# Patient Record
Sex: MALE | Race: WHITE | NOT HISPANIC OR LATINO | Employment: OTHER | URBAN - METROPOLITAN AREA
[De-identification: names, ages, dates, MRNs, and addresses within clinical notes are randomized per-mention and may not be internally consistent; named-entity substitution may affect disease eponyms.]

---

## 2017-01-12 RX ORDER — DULOXETIN HYDROCHLORIDE 60 MG/1
CAPSULE, DELAYED RELEASE ORAL
Qty: 60 CAPSULE | Refills: 3 | Status: SHIPPED | OUTPATIENT
Start: 2017-01-12 | End: 2017-05-14 | Stop reason: SDUPTHER

## 2017-02-10 ENCOUNTER — TELEPHONE (OUTPATIENT)
Dept: PAIN MEDICINE | Facility: CLINIC | Age: 67
End: 2017-02-10

## 2017-03-09 DIAGNOSIS — E03.9 HYPOTHYROIDISM, UNSPECIFIED TYPE: Primary | ICD-10-CM

## 2017-03-13 ENCOUNTER — RESULTS ENCOUNTER (OUTPATIENT)
Dept: FAMILY MEDICINE CLINIC | Facility: CLINIC | Age: 67
End: 2017-03-13

## 2017-03-13 DIAGNOSIS — E03.9 HYPOTHYROIDISM, UNSPECIFIED TYPE: ICD-10-CM

## 2017-03-14 ENCOUNTER — TELEPHONE (OUTPATIENT)
Dept: PAIN MEDICINE | Facility: CLINIC | Age: 67
End: 2017-03-14

## 2017-03-14 NOTE — TELEPHONE ENCOUNTER
Patient received a denial letter from [employer] that the SCS has NOT been approved. He wanted to know if you believe this needs to be appealed.    Spoke to Jenna and she states she sent all medical records that we have on him for the initial approval that was denied

## 2017-05-15 RX ORDER — LISINOPRIL AND HYDROCHLOROTHIAZIDE 20; 12.5 MG/1; MG/1
TABLET ORAL
Qty: 90 TABLET | Refills: 1 | Status: SHIPPED | OUTPATIENT
Start: 2017-05-15 | End: 2017-12-26 | Stop reason: SDUPTHER

## 2017-05-15 RX ORDER — DULOXETIN HYDROCHLORIDE 60 MG/1
CAPSULE, DELAYED RELEASE ORAL
Qty: 60 CAPSULE | Refills: 2 | Status: SHIPPED | OUTPATIENT
Start: 2017-05-15 | End: 2017-08-15 | Stop reason: SDUPTHER

## 2017-06-12 ENCOUNTER — OFFICE VISIT (OUTPATIENT)
Dept: FAMILY MEDICINE CLINIC | Facility: CLINIC | Age: 67
End: 2017-06-12

## 2017-06-12 VITALS
SYSTOLIC BLOOD PRESSURE: 142 MMHG | WEIGHT: 277.8 LBS | BODY MASS INDEX: 42.1 KG/M2 | HEIGHT: 68 IN | DIASTOLIC BLOOD PRESSURE: 76 MMHG | OXYGEN SATURATION: 94 % | TEMPERATURE: 98.3 F | HEART RATE: 79 BPM

## 2017-06-12 DIAGNOSIS — R91.1 SOLITARY PULMONARY NODULE: ICD-10-CM

## 2017-06-12 DIAGNOSIS — E03.9 ACQUIRED HYPOTHYROIDISM: ICD-10-CM

## 2017-06-12 DIAGNOSIS — I10 BENIGN ESSENTIAL HYPERTENSION: Primary | ICD-10-CM

## 2017-06-12 PROCEDURE — 99213 OFFICE O/P EST LOW 20 MIN: CPT | Performed by: FAMILY MEDICINE

## 2017-06-12 PROCEDURE — 90471 IMMUNIZATION ADMIN: CPT | Performed by: FAMILY MEDICINE

## 2017-06-12 PROCEDURE — 90715 TDAP VACCINE 7 YRS/> IM: CPT | Performed by: FAMILY MEDICINE

## 2017-06-12 RX ORDER — PHENYTOIN SODIUM 100 MG/1
CAPSULE, EXTENDED RELEASE ORAL
Qty: 450 CAPSULE | Refills: 2 | Status: SHIPPED | OUTPATIENT
Start: 2017-06-12 | End: 2018-05-02 | Stop reason: SDUPTHER

## 2017-06-12 NOTE — PROGRESS NOTES
"Subjective   Chava Stone Sr. is a 66 y.o. male.     Chief Complaint   Patient presents with   • Hypertension     follow up pt is fasting        History of Present Illness    Hypertension follow up. Doing well with current medication which he is taking as directed. No known high or low blood pressure episodes. No cardiovascular or neurological symptoms. Today's BP: 142/76 .    Hypothyroidism followup.  Taking levothyroxine as indicated.  No symptoms of high or low thyroid.  Last TSH as follows:    Lab Results   Component Value Date    TSH 5.060 (H) 12/12/2016        Pulmonary history of a pulmonary nodule.  Originally seen on the lateral view of an x-ray a year or 2 ago.  Last chest x-ray 2016 revealed that the nodule was gone.    Back pain syndrome.  He continues to follow with pain management.                The following portions of the patient's history were reviewed and updated as appropriate: allergies, current medications, past family history, past medical history, past social history, past surgical history and problem list.          Review of Systems   Constitutional: Negative.    Respiratory: Negative.    Cardiovascular: Negative.    Musculoskeletal: Negative.    Neurological: Negative.    Psychiatric/Behavioral: Negative.        Objective   Blood pressure 142/76, pulse 79, temperature 98.3 °F (36.8 °C), temperature source Oral, height 68\" (172.7 cm), weight 277 lb 12.8 oz (126 kg), SpO2 94 %.  Physical Exam   Constitutional: No distress.   HENT:   Head: Atraumatic.   Neck: Normal range of motion. Neck supple.   Cardiovascular: Normal rate and regular rhythm.    Pulmonary/Chest: Effort normal and breath sounds normal.   Skin: Skin is warm and dry.   Psychiatric: He has a normal mood and affect. His behavior is normal.       Assessment/Plan   Chava was seen today for hypertension.    Diagnoses and all orders for this visit:    Benign essential hypertension  -     Comprehensive Metabolic Panel  -     Lipid " Panel    Solitary pulmonary nodule    Acquired hypothyroidism  -     TSH Rfx On Abnormal To Free T4    Other orders  -     Tdap Vaccine Greater Than or Equal To 6yo IM      Hypertension.  We recently refilled his blood pressure medication.  Lab work today.    History of a solitary pulmonary nodule.  Now resolved.    Hypothyroidism.  Check TSH and adjust according.    Immunizations.  TdaP today.  Recent grandchild born.    Follow-up in 6 months for initial Medicare wellness visit.

## 2017-06-13 LAB
ALBUMIN SERPL-MCNC: 4 G/DL (ref 3.6–4.8)
ALBUMIN/GLOB SERPL: 1.3 {RATIO} (ref 1.2–2.2)
ALP SERPL-CCNC: 97 IU/L (ref 39–117)
ALT SERPL-CCNC: 29 IU/L (ref 0–44)
AST SERPL-CCNC: 25 IU/L (ref 0–40)
BILIRUB SERPL-MCNC: <0.2 MG/DL (ref 0–1.2)
BUN SERPL-MCNC: 24 MG/DL (ref 8–27)
BUN/CREAT SERPL: 15 (ref 10–24)
CALCIUM SERPL-MCNC: 8.9 MG/DL (ref 8.6–10.2)
CHLORIDE SERPL-SCNC: 97 MMOL/L (ref 96–106)
CHOLEST SERPL-MCNC: 259 MG/DL (ref 100–199)
CO2 SERPL-SCNC: 24 MMOL/L (ref 18–29)
CREAT SERPL-MCNC: 1.58 MG/DL (ref 0.76–1.27)
GLOBULIN SER CALC-MCNC: 3 G/DL (ref 1.5–4.5)
GLUCOSE SERPL-MCNC: 96 MG/DL (ref 65–99)
HDLC SERPL-MCNC: 80 MG/DL
LDLC SERPL CALC-MCNC: 161 MG/DL (ref 0–99)
POTASSIUM SERPL-SCNC: 5.1 MMOL/L (ref 3.5–5.2)
PROT SERPL-MCNC: 7 G/DL (ref 6–8.5)
SODIUM SERPL-SCNC: 139 MMOL/L (ref 134–144)
TRIGL SERPL-MCNC: 88 MG/DL (ref 0–149)
TSH SERPL DL<=0.005 MIU/L-ACNC: 3.66 UIU/ML (ref 0.45–4.5)
VLDLC SERPL CALC-MCNC: 18 MG/DL (ref 5–40)

## 2017-06-13 NOTE — PROGRESS NOTES
Kidney function is off.  Possibly related to fasting.  Please return for further evaluation.  Make sure you drink plenty water that day.  I want to repeat the lab studies.  Also further discuss with you.  Also cholesterol is high.  Will need to further discuss.

## 2017-08-15 RX ORDER — DULOXETIN HYDROCHLORIDE 60 MG/1
CAPSULE, DELAYED RELEASE ORAL
Qty: 60 CAPSULE | Refills: 1 | Status: SHIPPED | OUTPATIENT
Start: 2017-08-15 | End: 2017-10-11 | Stop reason: SDUPTHER

## 2017-09-01 RX ORDER — PREGABALIN 150 MG/1
CAPSULE ORAL
Qty: 360 CAPSULE | Refills: 2 | OUTPATIENT
Start: 2017-09-01 | End: 2018-03-26 | Stop reason: HOSPADM

## 2017-09-19 RX ORDER — LEVOTHYROXINE SODIUM 88 UG/1
TABLET ORAL
Qty: 90 TABLET | Refills: 1 | Status: SHIPPED | OUTPATIENT
Start: 2017-09-19 | End: 2018-03-14 | Stop reason: SDUPTHER

## 2017-10-11 RX ORDER — DULOXETIN HYDROCHLORIDE 60 MG/1
CAPSULE, DELAYED RELEASE ORAL
Qty: 60 CAPSULE | Refills: 2 | Status: SHIPPED | OUTPATIENT
Start: 2017-10-11 | End: 2018-01-29 | Stop reason: SDUPTHER

## 2017-12-26 RX ORDER — LISINOPRIL AND HYDROCHLOROTHIAZIDE 20; 12.5 MG/1; MG/1
TABLET ORAL
Qty: 90 TABLET | Refills: 1 | Status: SHIPPED | OUTPATIENT
Start: 2017-12-26 | End: 2018-06-29 | Stop reason: SDUPTHER

## 2017-12-27 DIAGNOSIS — M48.061 SPINAL STENOSIS OF LUMBAR REGION, UNSPECIFIED WHETHER NEUROGENIC CLAUDICATION PRESENT: ICD-10-CM

## 2017-12-27 DIAGNOSIS — M96.1 POSTLAMINECTOMY SYNDROME OF LUMBAR REGION: Primary | ICD-10-CM

## 2018-01-24 ENCOUNTER — TREATMENT (OUTPATIENT)
Dept: PHYSICAL THERAPY | Facility: CLINIC | Age: 68
End: 2018-01-24

## 2018-01-24 DIAGNOSIS — R26.2 DIFFICULTY IN WALKING: Primary | ICD-10-CM

## 2018-01-24 DIAGNOSIS — R29.898 WEAKNESS OF PROXIMAL END OF LOWER EXTREMITY: ICD-10-CM

## 2018-01-24 PROCEDURE — 97110 THERAPEUTIC EXERCISES: CPT | Performed by: PHYSICAL THERAPIST

## 2018-01-24 PROCEDURE — G8978 MOBILITY CURRENT STATUS: HCPCS | Performed by: PHYSICAL THERAPIST

## 2018-01-24 PROCEDURE — G8979 MOBILITY GOAL STATUS: HCPCS | Performed by: PHYSICAL THERAPIST

## 2018-01-24 PROCEDURE — 97162 PT EVAL MOD COMPLEX 30 MIN: CPT | Performed by: PHYSICAL THERAPIST

## 2018-01-24 PROCEDURE — 97116 GAIT TRAINING THERAPY: CPT | Performed by: PHYSICAL THERAPIST

## 2018-01-24 NOTE — PROGRESS NOTES
Physical Therapy Initial Evaluation and Plan of Care      Patient: Chava Stone Sr.   : 1950  Diagnosis/ICD-10 Code:  Difficulty in walking [R26.2]  Referring practitioner: LIANA Silver    Subjective Evaluation    History of Present Illness  Mechanism of injury: 2006 started locking up. Getting weak.  Stimulator implanted for back pain. PAin then moved to legs. Can't walk. Need rollator.  No injuries. Would get LBP after bending. L LE numb and foot drop 30 years ago. Resolved.  Now  Numbness and weakness L > R numbness. R leg feel sstronger  Tried [place] trak, got sharp pain L-S      Patient Occupation: used to  [employer]. Oct 2006. Pain  Quality: dull ache  Relieving factors: rest  Exacerbated by: constant.    Social Support  Lives in: one-story house (one step to house)  Lives with: friend.    Treatments  Previous treatment: physical therapy (water)  Current treatment: medication  Patient Goals  Patient goals for therapy: improved balance, independence with ADLs/IADLs and increased strength  Patient goal: difficult getting in and out of tub. Balance getting worse           Objective       Passive Range of Motion   Left Hip   Internal rotation (90/90): 0 (seated) degrees     Right Hip   Internal rotation (90/90): 0 (seated) degrees     Strength/Myotome Testing     Left Hip   Planes of Motion   Flexion: 3-    Right Hip   Planes of Motion   Flexion: 3-    Additional Strength Details  Pt unable to lay supine, sit without UE support on table. Unable to do specific strength test due to deconditioned status  Pt needed UE assistance B to perform sit to  c,inic  Extends R knee when performing sit to stand.        Ambulation     Ambulation: Level Surfaces   Ambulation with assistive device: moderate assist ( rollator)         Assessment & Plan     Assessment  Impairments: abnormal or restricted ROM, activity intolerance, impaired physical strength and lacks appropriate home  exercise program  Assessment details:     Pt is a candidate for skilled PT intervention to restore functional AROM and strength to return to previous level of ADL's.  Very deconditioned with simple bed mobility and gait  Prognosis: fair  Prognosis details:     Short Term Goals (4 weeks)  1. Pt to be (I) with HEP  2. Pt able to sit upright without UE or back support  3. Pt ambulate // bars 10 without fatigue complaints    Long Term Goals (8 weeks)  1. Pt able to walk household distances with straight cane  3 miles without residual hip or knee pain  2. Pt to exhibit 4/5 hip flexion strength  3. Pt to perform sit to stand (I)  4. Pt to score 40/80 on LEFS  Functional Limitations: walking, uncomfortable because of pain and moving in bed      Manual Therapy:    10     mins  03002;  Therapeutic Exercise:    5     mins  80661;     Neuromuscular Iraida:        mins  88824;    Therapeutic Activity:          mins  69612;     Gait Training:      10     mins  30886;     Ultrasound:          mins  51195;    Electrical Stimulation:         mins  31179 ( );  Dry Needling          mins self-pay    Timed Treatment:   25   mins   Total Treatment:     55   mins    PT SIGNATURE: Alaina Bang PT   [place] License # [id number]  DATE TREATMENT INITIATED: 1/25/2018    Initial Certification  Certification Period: 4/25/2018  I certify that the therapy services are furnished while this patient is under my care.  The services outlined above are required by this patient, and will be reviewed every 90 days.     PHYSICIAN: Amy Guerra, LIANA      DATE:     Please sign and return via fax to [phone].. Thank you, [facility].

## 2018-01-30 RX ORDER — DULOXETIN HYDROCHLORIDE 60 MG/1
CAPSULE, DELAYED RELEASE ORAL
Qty: 60 CAPSULE | Refills: 0 | Status: SHIPPED | OUTPATIENT
Start: 2018-01-30 | End: 2018-03-26 | Stop reason: HOSPADM

## 2018-02-01 ENCOUNTER — TREATMENT (OUTPATIENT)
Dept: PHYSICAL THERAPY | Facility: CLINIC | Age: 68
End: 2018-02-01

## 2018-02-01 DIAGNOSIS — R26.2 DIFFICULTY IN WALKING: Primary | ICD-10-CM

## 2018-02-01 DIAGNOSIS — R29.898 WEAKNESS OF PROXIMAL END OF LOWER EXTREMITY: ICD-10-CM

## 2018-02-01 PROCEDURE — 97110 THERAPEUTIC EXERCISES: CPT | Performed by: PHYSICAL THERAPIST

## 2018-02-01 PROCEDURE — 97530 THERAPEUTIC ACTIVITIES: CPT | Performed by: PHYSICAL THERAPIST

## 2018-02-02 NOTE — PROGRESS NOTES
Physical Therapy Daily Progress Note        Subjective     Chava Bertha reports: Sorry I missed last appt. Could not get a ride. Working on not crossing legs while seated    Objective   See Exercise, Manual, and Modality Logs for complete treatment.       Assessment/Plan  Mod assist of 2 for sit to stand from chair. Needs cues to stand upright in bars. Improved stride length today but limited hip extension during stance     Progress per Plan of Care           Manual Therapy:  5       mins  27564;  Therapeutic Exercise: 30      mins  26021;     Neuromuscular Iraida:       mins  04063;    Therapeutic Activity:    8     mins  56380;     Gait Training:         mins  98670;     Ultrasound:         mins  10005;    Electrical Stimulation:        mins  70068 ( );  Dry Needling         mins self-pay    Timed Treatment:   43   mins   Total Treatment:     45   mins    Alaina Bang PT  Physical Therapist  [place] License # [id number]

## 2018-02-08 ENCOUNTER — TREATMENT (OUTPATIENT)
Dept: PHYSICAL THERAPY | Facility: CLINIC | Age: 68
End: 2018-02-08

## 2018-02-08 DIAGNOSIS — R29.898 WEAKNESS OF PROXIMAL END OF LOWER EXTREMITY: ICD-10-CM

## 2018-02-08 DIAGNOSIS — R26.2 DIFFICULTY IN WALKING: Primary | ICD-10-CM

## 2018-02-08 PROCEDURE — 97140 MANUAL THERAPY 1/> REGIONS: CPT | Performed by: PHYSICAL THERAPIST

## 2018-02-08 PROCEDURE — 97110 THERAPEUTIC EXERCISES: CPT | Performed by: PHYSICAL THERAPIST

## 2018-02-08 PROCEDURE — 97116 GAIT TRAINING THERAPY: CPT | Performed by: PHYSICAL THERAPIST

## 2018-02-08 NOTE — PROGRESS NOTES
Physical Therapy Daily Progress Note        Subjective     Chava Stone reports: Sore everywhere. Working on not crossing legs when I sit    Objective   HIp abd 3-/5 B  See Exercise, Manual, and Modality Logs for complete treatment.       Assessment/Plan  Encouraged pt to sit in chairs that are firmer and to practice hip hinge/lean forward to assist with sit to stand transfer. Pt was able to sit upright with less UE support today. Great difficulty doing S/L hip abduction exercises    Progress per Plan of Care           Manual Therapy:  8       mins  16565;  Therapeutic Exercise: 15      mins  38736;     Neuromuscular Iraida:       mins  71556;    Therapeutic Activity:    6     mins  67976;     Gait Training:    10     mins  68929;     Ultrasound:         mins  22995;    Electrical Stimulation:        mins  24636 ( );  Dry Needling         mins self-pay    Timed Treatment:   39   mins   Total Treatment:     45   mins    Alaina Bang, PT  Physical Therapist  [place] License # [id number]

## 2018-02-15 ENCOUNTER — TREATMENT (OUTPATIENT)
Dept: PHYSICAL THERAPY | Facility: CLINIC | Age: 68
End: 2018-02-15

## 2018-02-15 DIAGNOSIS — R26.2 DIFFICULTY IN WALKING: Primary | ICD-10-CM

## 2018-02-15 PROCEDURE — 97140 MANUAL THERAPY 1/> REGIONS: CPT | Performed by: PHYSICAL THERAPIST

## 2018-02-15 PROCEDURE — 97110 THERAPEUTIC EXERCISES: CPT | Performed by: PHYSICAL THERAPIST

## 2018-02-15 PROCEDURE — 97116 GAIT TRAINING THERAPY: CPT | Performed by: PHYSICAL THERAPIST

## 2018-02-16 NOTE — PATIENT INSTRUCTIONS
Discussed getting Life Alert for personal use in case he falls again. He said he has discussed with his son.

## 2018-02-20 ENCOUNTER — TREATMENT (OUTPATIENT)
Dept: PHYSICAL THERAPY | Facility: CLINIC | Age: 68
End: 2018-02-20

## 2018-02-20 DIAGNOSIS — R26.2 DIFFICULTY IN WALKING: Primary | ICD-10-CM

## 2018-02-20 PROCEDURE — 97530 THERAPEUTIC ACTIVITIES: CPT | Performed by: PHYSICAL THERAPIST

## 2018-02-20 PROCEDURE — 97116 GAIT TRAINING THERAPY: CPT | Performed by: PHYSICAL THERAPIST

## 2018-02-20 PROCEDURE — 97110 THERAPEUTIC EXERCISES: CPT | Performed by: PHYSICAL THERAPIST

## 2018-02-20 NOTE — PROGRESS NOTES
Physical Therapy Daily Progress Note        Subjective     Chava Stone reports: NO new complaints.    Objective   See Exercise, Manual, and Modality Logs for complete treatment.       Assessment/Plan  Pt able to lay supine for first time on mat table but needed to keep hips and knees flexed on ball. Unable to tolerate flat supine lying. Still difficulty getting up from chair/bench in waiting room    Progress per Plan of Care   Attempt Nustep next visit           Manual Therapy:         mins  51583;  Therapeutic Exercise: 22      mins  58649;     Neuromuscular Iraida:       mins  44630;    Therapeutic Activity:    6     mins  73735;     Gait Training:    10     mins  46406;     Ultrasound:         mins  73588;    Electrical Stimulation:        mins  97617 ( );  Dry Needling         mins self-pay    Timed Treatment:   38   mins   Total Treatment:     50   mins    Alaina Bang, PT  Physical Therapist  [place] License # [id number]

## 2018-02-27 ENCOUNTER — TREATMENT (OUTPATIENT)
Dept: PHYSICAL THERAPY | Facility: CLINIC | Age: 68
End: 2018-02-27

## 2018-02-27 DIAGNOSIS — R29.898 WEAKNESS OF PROXIMAL END OF LOWER EXTREMITY: ICD-10-CM

## 2018-02-27 DIAGNOSIS — R26.2 DIFFICULTY IN WALKING: Primary | ICD-10-CM

## 2018-02-27 PROCEDURE — 97110 THERAPEUTIC EXERCISES: CPT | Performed by: PHYSICAL THERAPIST

## 2018-02-27 PROCEDURE — 97116 GAIT TRAINING THERAPY: CPT | Performed by: PHYSICAL THERAPIST

## 2018-02-27 PROCEDURE — 97140 MANUAL THERAPY 1/> REGIONS: CPT | Performed by: PHYSICAL THERAPIST

## 2018-02-27 RX ORDER — DULOXETIN HYDROCHLORIDE 60 MG/1
CAPSULE, DELAYED RELEASE ORAL
Qty: 60 CAPSULE | Refills: 0 | OUTPATIENT
Start: 2018-02-27

## 2018-02-27 NOTE — PROGRESS NOTES
Physical Therapy Daily Progress Note        Subjective     Chava Stone reports: HAving rough day with knees, R > L    Objective   See Exercise, Manual, and Modality Logs for complete treatment.       Assessment/Plan  Pt able to transfer sit to supine without assist today. Needed mod A of one to perform sit to stand with gait belt but needed cueing to not reach for walker while trying to stand. Improved upright posture in parallel bars.     Progress per Plan of Care           Manual Therapy:  10       mins  05100;  Therapeutic Exercise: 30      mins  57680;     Neuromuscular Iraida:       mins  41585;    Therapeutic Activity:         mins  18239;     Gait Training:    10     mins  50008;     Ultrasound:         mins  13373;    Electrical Stimulation:        mins  49756 ( );  Dry Needling         mins self-pay    Timed Treatment:   50   mins   Total Treatment:     55   mins    Alaina Bang, PT  Physical Therapist  [place] License # [id number]

## 2018-03-05 RX ORDER — DULOXETIN HYDROCHLORIDE 60 MG/1
CAPSULE, DELAYED RELEASE ORAL
Qty: 60 CAPSULE | Refills: 0 | OUTPATIENT
Start: 2018-03-05

## 2018-03-13 ENCOUNTER — TREATMENT (OUTPATIENT)
Dept: PHYSICAL THERAPY | Facility: CLINIC | Age: 68
End: 2018-03-13

## 2018-03-13 DIAGNOSIS — R26.2 DIFFICULTY IN WALKING: Primary | ICD-10-CM

## 2018-03-13 DIAGNOSIS — R29.898 WEAKNESS OF PROXIMAL END OF LOWER EXTREMITY: ICD-10-CM

## 2018-03-13 PROCEDURE — 97110 THERAPEUTIC EXERCISES: CPT | Performed by: PHYSICAL THERAPIST

## 2018-03-13 PROCEDURE — 97116 GAIT TRAINING THERAPY: CPT | Performed by: PHYSICAL THERAPIST

## 2018-03-14 RX ORDER — LEVOTHYROXINE SODIUM 88 UG/1
TABLET ORAL
Qty: 90 TABLET | Refills: 1 | Status: SHIPPED | OUTPATIENT
Start: 2018-03-14 | End: 2018-08-21

## 2018-03-20 DIAGNOSIS — G89.29 OTHER CHRONIC PAIN: ICD-10-CM

## 2018-03-20 DIAGNOSIS — M96.1 POSTLAMINECTOMY SYNDROME OF LUMBAR REGION: Primary | ICD-10-CM

## 2018-03-20 DIAGNOSIS — M48.061 SPINAL STENOSIS OF LUMBAR REGION, UNSPECIFIED WHETHER NEUROGENIC CLAUDICATION PRESENT: ICD-10-CM

## 2018-03-26 ENCOUNTER — OFFICE VISIT (OUTPATIENT)
Dept: PAIN MEDICINE | Facility: CLINIC | Age: 68
End: 2018-03-26

## 2018-03-26 VITALS
SYSTOLIC BLOOD PRESSURE: 149 MMHG | WEIGHT: 277 LBS | TEMPERATURE: 97.3 F | DIASTOLIC BLOOD PRESSURE: 86 MMHG | OXYGEN SATURATION: 94 % | HEART RATE: 85 BPM | RESPIRATION RATE: 18 BRPM | HEIGHT: 68 IN | BODY MASS INDEX: 41.98 KG/M2

## 2018-03-26 DIAGNOSIS — M48.061 SPINAL STENOSIS OF LUMBAR REGION, UNSPECIFIED WHETHER NEUROGENIC CLAUDICATION PRESENT: ICD-10-CM

## 2018-03-26 DIAGNOSIS — M54.16 LUMBAR RADICULOPATHY: ICD-10-CM

## 2018-03-26 DIAGNOSIS — M96.1 POSTLAMINECTOMY SYNDROME OF LUMBAR REGION: ICD-10-CM

## 2018-03-26 DIAGNOSIS — G89.29 OTHER CHRONIC PAIN: Primary | ICD-10-CM

## 2018-03-26 DIAGNOSIS — M79.604 PAIN IN BOTH LOWER EXTREMITIES: ICD-10-CM

## 2018-03-26 DIAGNOSIS — M79.605 PAIN IN BOTH LOWER EXTREMITIES: ICD-10-CM

## 2018-03-26 PROCEDURE — 99214 OFFICE O/P EST MOD 30 MIN: CPT | Performed by: NURSE PRACTITIONER

## 2018-03-26 RX ORDER — DULOXETIN HYDROCHLORIDE 60 MG/1
120 CAPSULE, DELAYED RELEASE ORAL NIGHTLY
Qty: 60 CAPSULE | Refills: 1 | Status: SHIPPED | OUTPATIENT
Start: 2018-03-26 | End: 2018-05-21 | Stop reason: SDUPTHER

## 2018-03-26 RX ORDER — PREGABALIN 150 MG/1
CAPSULE ORAL
Qty: 60 CAPSULE | Refills: 1 | Status: SHIPPED | OUTPATIENT
Start: 2018-03-26 | End: 2018-05-21 | Stop reason: SDUPTHER

## 2018-03-26 NOTE — PROGRESS NOTES
"CHIEF COMPLAINT  Leg pain has increased since he was last seen in 2016. He states he stopped PT 2 weeks ago because the therapist told him that it was not helping. He states he has stopped taking Lyrica and Cymbalta because of side effects.    Subjective   Chava Stone Tam is a 67 y.o. male  who presents to the office for follow-up.He has a history of chronic bilateral leg pain with a history of back surgery. He has been without Lyrica and Cymbalta for a few weeks. His pain is worse.     He was supposed to have SCS battery revision, but his insurance denied this. We did appeal this, but the denial was upheld.   He currently has a BS SCS that was placed 3-2014.He has been seen by neurosurgery. Was told nothing further surgical.     Complains of pain in his low back and legs. Today his pain is 6/10VAS. Describes the pain as continuous and worse since last office visit.  He was previously on Lyrica and Cymbalta. Ran out and \"tried to tough it out to just see.\" Was on Cymbalta 60 mg once daily and Lyrica 150 mg 1-2/day PRN. Did have some somnolence but is unsure which is making him sleepy. He was taking Cymbalta during the morning.  ADL's by self.    He has a BS SCS. Is still using this almost all the time. \"It's on now and I leave it on.\"     Patient has failed PT and aquatherapy PT.   Has seen neurosurgery. No plans for future surgery.    Back Pain   The current episode started more than 1 year ago. The problem occurs daily. The problem has been gradually worsening since onset. The pain is present in the sacro-iliac and lumbar spine. The quality of the pain is described as aching and burning. The pain radiates to the left knee, left thigh, right knee and right thigh. The pain is at a severity of 6/10. The pain is moderate. The symptoms are aggravated by bending, position, lying down and standing. Stiffness is present all day. Associated symptoms include leg pain, numbness, paresthesias and weakness. Pertinent " "negatives include no abdominal pain, bladder incontinence, bowel incontinence, chest pain, dysuria, fever, headaches, pelvic pain, perianal numbness, tingling or weight loss. Risk factors include lack of exercise and obesity. Treatments tried: BS SCS, Lyrica, Cymbalta.      PEG Assessment   What number best describes your pain on average in the past week?6  What number best describes how, during the past week, pain has interfered with your enjoyment of life?6  What number best describes how, during the past week, pain has interfered with your general activity?  6    The following portions of the patient's history were reviewed and updated as appropriate: allergies, current medications, past family history, past medical history, past social history, past surgical history and problem list.    Review of Systems   Constitutional: Positive for chills. Negative for fever and weight loss.   Respiratory: Negative for shortness of breath.    Cardiovascular: Negative for chest pain.   Gastrointestinal: Negative for abdominal pain, bowel incontinence, constipation, diarrhea, nausea and vomiting.   Genitourinary: Negative for bladder incontinence, difficulty urinating, dysuria, enuresis and pelvic pain.   Musculoskeletal: Positive for back pain.   Neurological: Positive for weakness, numbness and paresthesias. Negative for dizziness, tingling, light-headedness and headaches.   Psychiatric/Behavioral: Negative for confusion, hallucinations, self-injury, sleep disturbance and suicidal ideas. The patient is not nervous/anxious.        Vitals:    03/26/18 1431   BP: 149/86   Pulse: 85   Resp: 18   Temp: 97.3 °F (36.3 °C)   SpO2: 94%   Weight: 126 kg (277 lb)   Height: 172.7 cm (68\")   PainSc:   6   PainLoc: Leg     Objective   Physical Exam   Constitutional: He is oriented to person, place, and time. Vital signs are normal. He appears well-developed and well-nourished. He is cooperative.   HENT:   Head: Normocephalic and " atraumatic.   Nose: Nose normal.   Eyes: Conjunctivae and lids are normal.   Cardiovascular: Normal rate.    Pulmonary/Chest: Effort normal.   Abdominal:   obese   Musculoskeletal:        Lumbar back: He exhibits tenderness, bony tenderness and pain.   Surgical scar of lumbar spine   Neurological: He is alert and oriented to person, place, and time. Gait (in wheelchair today) abnormal. Coordination normal.   Reflex Scores:       Patellar reflexes are 0 on the right side and 0 on the left side.       Achilles reflexes are 0 on the right side and 0 on the left side.  dysthesias of BLE's   Skin: Skin is warm, dry and intact.   Psychiatric: He has a normal mood and affect. His speech is normal and behavior is normal. Judgment and thought content normal. Cognition and memory are normal.   Nursing note and vitals reviewed.      Assessment/Plan   Diagnoses and all orders for this visit:    Other chronic pain    Postlaminectomy syndrome of lumbar region    Spinal stenosis of lumbar region, unspecified whether neurogenic claudication present    Lumbar radiculopathy    Pain in both lower extremities    Other orders  -     DULoxetine (CYMBALTA) 60 MG capsule; Take 2 capsules by mouth Every Night.  -     pregabalin (LYRICA) 150 MG capsule; Take 2 capsules PO twice a day      --- Re-start Cymbalta 60 mg NIGHTLY. Discussed medication with the patient.  Included in this discussion was the potential for side effects and adverse events.  Patient verbalized understanding and wished to proceed.  Prescription will be sent to pharmacy.  --- Re-start Lyrica 150 mg 1-2/day. Discussed medication with the patient.  Included in this discussion was the potential for side effects and adverse events.  Patient verbalized understanding and wished to proceed.  Prescription will be given to patient.  --- declines reprogramming.   --- Follow-up 6-8 weeks or sooner if needed.       ROXANA REPORT    As part of the patient's treatment plan, I am  prescribing controlled substances. The patient has been made aware of appropriate use of such medications, including potential risk of somnolence, limited ability to drive and/or work safely, and the potential for dependence or overdose. It has also bee made clear that these medications are for use by this patient only, without concomitant use of alcohol or other substances unless prescribed.     Patient has completed prescribing agreement detailing terms of continued prescribing of controlled substances, including monitoring ROXANA reports, urine drug screening, and pill counts if necessary. The patient is aware that inappropriate use will results in cessation of prescribing such medications.    ROXANA report has been reviewed and scanned into the patient's chart.    Date of last ROXANA : 3-23-18    History and physical exam exhibit continued safe and appropriate use of controlled substances.      EMR Dragon/Transcription disclaimer:   Much of this encounter note is an electronic transcription/translation of spoken language to printed text. The electronic translation of spoken language may permit erroneous, or at times, nonsensical words or phrases to be inadvertently transcribed; Although I have reviewed the note for such errors, some may still exist.

## 2018-04-11 RX ORDER — PHENYTOIN SODIUM 100 MG/1
CAPSULE, EXTENDED RELEASE ORAL
Qty: 450 CAPSULE | Refills: 1 | OUTPATIENT
Start: 2018-04-11

## 2018-05-02 RX ORDER — PHENYTOIN SODIUM 100 MG/1
100 CAPSULE, EXTENDED RELEASE ORAL DAILY
Qty: 450 CAPSULE | Refills: 2 | Status: SHIPPED | OUTPATIENT
Start: 2018-05-02 | End: 2018-05-07 | Stop reason: SDUPTHER

## 2018-05-02 NOTE — TELEPHONE ENCOUNTER
----- Message from Leigh Serna sent at 5/2/2018  1:17 PM EDT -----  Contact: [phone]  Patient needs a refill on DILANTIN 100 MG ER capsule

## 2018-05-07 RX ORDER — PHENYTOIN SODIUM 100 MG/1
500 CAPSULE, EXTENDED RELEASE ORAL DAILY
Qty: 450 CAPSULE | Refills: 2 | Status: SHIPPED | OUTPATIENT
Start: 2018-05-07 | End: 2018-08-21

## 2018-05-14 RX ORDER — LISINOPRIL AND HYDROCHLOROTHIAZIDE 20; 12.5 MG/1; MG/1
1 TABLET ORAL DAILY
Qty: 90 TABLET | Refills: 0 | OUTPATIENT
Start: 2018-05-14

## 2018-05-21 ENCOUNTER — OFFICE VISIT (OUTPATIENT)
Dept: PAIN MEDICINE | Facility: CLINIC | Age: 68
End: 2018-05-21

## 2018-05-21 VITALS
RESPIRATION RATE: 16 BRPM | OXYGEN SATURATION: 94 % | HEIGHT: 68 IN | DIASTOLIC BLOOD PRESSURE: 70 MMHG | HEART RATE: 103 BPM | SYSTOLIC BLOOD PRESSURE: 112 MMHG | TEMPERATURE: 99.5 F

## 2018-05-21 DIAGNOSIS — Z96.89 SPINAL CORD STIMULATOR STATUS: ICD-10-CM

## 2018-05-21 DIAGNOSIS — M79.604 PAIN IN BOTH LOWER EXTREMITIES: ICD-10-CM

## 2018-05-21 DIAGNOSIS — M79.605 PAIN IN BOTH LOWER EXTREMITIES: ICD-10-CM

## 2018-05-21 DIAGNOSIS — G89.29 OTHER CHRONIC PAIN: Primary | ICD-10-CM

## 2018-05-21 DIAGNOSIS — M54.16 LUMBAR RADICULOPATHY: ICD-10-CM

## 2018-05-21 DIAGNOSIS — M48.061 SPINAL STENOSIS OF LUMBAR REGION, UNSPECIFIED WHETHER NEUROGENIC CLAUDICATION PRESENT: ICD-10-CM

## 2018-05-21 PROCEDURE — 99213 OFFICE O/P EST LOW 20 MIN: CPT | Performed by: NURSE PRACTITIONER

## 2018-05-21 RX ORDER — PREGABALIN 150 MG/1
CAPSULE ORAL
Qty: 60 CAPSULE | Refills: 1 | Status: SHIPPED | OUTPATIENT
Start: 2018-05-21 | End: 2018-06-23 | Stop reason: SDUPTHER

## 2018-05-21 RX ORDER — DULOXETIN HYDROCHLORIDE 60 MG/1
120 CAPSULE, DELAYED RELEASE ORAL NIGHTLY
Qty: 60 CAPSULE | Refills: 1 | Status: SHIPPED | OUTPATIENT
Start: 2018-05-21 | End: 2018-07-24 | Stop reason: SDUPTHER

## 2018-05-21 NOTE — PROGRESS NOTES
CHIEF COMPLAINT  Pt states his R leg pain extending from hip to top of R foot has been worse since March/18  Pt also is having constant burning /tingling in feet bilaterally and continues to use walker.    Subjective   Chava Stone Sr. is a 67 y.o. male  who presents to the office for follow-up.He has a history of chronic back and leg pain. Reports his pain is worse since last office visit. He reports minimal improvement since re-starting his medication regimen. At his last office visit, he was restarted on Cymbalta 60 mg 2/day and Lyrica 150 mg 2 BID.  He later admits he is taking more Cymbalta. However, he is only taking 150 mg 2/morning and then 1/afternoon.    Patient has failed PT and aquatherapy PT.   Has seen neurosurgery. No plans for future surgery.  Has a history of abnormal prostate test with biopsy positive for cancer originally, but negative since.     Back Pain   The current episode started more than 1 year ago. The problem occurs 2 to 4 times per day. The problem has been gradually worsening (worse since last office visit) since onset. The pain is present in the sacro-iliac and lumbar spine. The quality of the pain is described as aching and burning. The pain radiates to the left knee, left thigh, right knee and right thigh. The pain is at a severity of 2/10 (pain ranges from 9/10VAS). The pain is moderate. The symptoms are aggravated by bending, position, lying down and standing. Stiffness is present all day. Associated symptoms include leg pain, numbness (legs), paresthesias and weakness (legs bilat.). Pertinent negatives include no abdominal pain, bladder incontinence, bowel incontinence, chest pain, dysuria, fever, headaches, pelvic pain, perianal numbness, tingling or weight loss. Risk factors include lack of exercise and obesity. Treatments tried: BS SCS, Lyrica, Cymbalta.      DATE OF EVALUATION: 03/25/2016     REFERRING PROVIDER: LIANA Solorio     HISTORY OF PRESENT ILLNESS: The  patient is a 65-year-old nondiabetic male who presents with a complaint of pain in the low back region and both lower extremities.      The patient has a history of chronic low back pain. He states that he has been experiencing pain and burning of both lower extremities for 18 months.      I. Nerve conduction studies:      The distal latency of the left peroneal motor nerve is 6.5 ms (upper limit of normal 6.1 ms). The amplitude of evoked response is 333 microvolts (normal greater than 2 mV). The conduction velocity is 38 m/sec.      The distal latency of left tibial motor nerve is 6.1 ms. The amplitude of evoked response is 2.6 mV. The conduction velocity is 42 m/sec.      The distal latency of the right peroneal motor nerve is 5.9 ms. The amplitude of evoked response is 333 microvolts (normal greater than 2 mV). The conduction velocity is 40 m/sec.     The distal latency of the right tibial motor nerve is 6.4 ms. The amplitude of evoked response is 1.2 mV (normal greater than 2 mV). The conduction velocity is 40 m/sec.      The distal latency of the right sural sensory nerve is 3.7 ms. The amplitude of evoked response is 8 microvolts (normal greater than 15 microvolts).      II. Electromyography:      Electromyography was performed on the following muscles of both lower extremities using a monopolar needle: Vastus lateralis, tibialis anterior, extensor digitorum longus, peroneus longus, and the medial and lateral gastrocnemius.      There were no changes in insertional activity. There were no denervation potentials present.      The motor unit action potentials were of normal height and duration. The recruitment patterns were normal.      III. IMPRESSION:      1. 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   2. There is no electrophysiologic evidence of a lumbar radiculopathy. If clinical suspicion is high for a radicular process, spinal imaging should be considered.         Mone GARSIA  JOJO Hidalgo.*    LUMBAR CT WITHOUT CONTRAST     HISTORY: Spinal cord stimulator. New onset weakness and bilateral lower  extremity radicular symptoms. Symptoms involve the left leg more than  the right.     TECHNIQUE: Axial noncontrast CT scan of the lumbar spine shows from  about the mid T7 vertebral level through the S2 level. This is  correlated with lumbar CT myelogram 02/26/2013.     FINDINGS: The prior myelogram showed the tip of the conus terminating at  the T12 inferior endplate level. Today's exam shows a spinal cord  stimulator device with leads entering the canal at the T12-L1  interlaminar space and extending cephalad along the posterior edge of  the canal such that their distal ends terminate at the level of the T8  superior endplate.     There is osseous bridging across the disc spaces to the right of midline  at every level from T7 through T11 and to the left of midline at T11-12.  Lower thoracic vertebral body height and alignment are normal. As can  best be appreciated, the canal and foramina are patent.     At T12-L1, the disc is narrowed but the canal and foramina are patent.     Lumbar vertebral body height and alignment are normal. There is minimal  disc narrowing at L1-2, L2-3, and L3-4 but the canal and foramina at  those levels remain patent.     At L4-5, there is disc narrowing and some vacuum disc phenomenon with  hypertrophic facet change worse on the left than the right. There is  moderate bony foraminal stenosis, similar to that seen previously.     At L5-S1, there is complete loss of disc height with bridging osteophyte  anteriorly. Foramina are mildly narrowed. The appearance is unchanged.     The visualized upper sacrum and retroperitoneum appear normal.     LUMBAR CT WITHOUT CONTRAST     HISTORY: Spinal cord stimulator. New onset weakness and bilateral lower  extremity radicular symptoms. Symptoms involve the left leg more than  the right.     TECHNIQUE: Axial noncontrast CT scan of  the lumbar spine shows from  about the mid T7 vertebral level through the S2 level. This is  correlated with lumbar CT myelogram 02/26/2013.     FINDINGS: The prior myelogram showed the tip of the conus terminating at  the T12 inferior endplate level. Today's exam shows a spinal cord  stimulator device with leads entering the canal at the T12-L1  interlaminar space and extending cephalad along the posterior edge of  the canal such that their distal ends terminate at the level of the T8  superior endplate.     There is osseous bridging across the disc spaces to the right of midline  at every level from T7 through T11 and to the left of midline at T11-12.  Lower thoracic vertebral body height and alignment are normal. As can  best be appreciated, the canal and foramina are patent.     At T12-L1, the disc is narrowed but the canal and foramina are patent.     Lumbar vertebral body height and alignment are normal. There is minimal  disc narrowing at L1-2, L2-3, and L3-4 but the canal and foramina at  those levels remain patent.     At L4-5, there is disc narrowing and some vacuum disc phenomenon with  hypertrophic facet change worse on the left than the right. There is  moderate bony foraminal stenosis, similar to that seen previously.     At L5-S1, there is complete loss of disc height with bridging osteophyte  anteriorly. Foramina are mildly narrowed. The appearance is unchanged.     The visualized upper sacrum and retroperitoneum appear normal.     IMPRESSION:  There is spinal cord stimulator device positioned as  described. There is some degenerative change in the lumbar spine but no  stenosis, disc herniation, or other lesion is identified.     There is spinal cord stimulator device positioned as  described. There is some degenerative change in the lumbar spine but no  stenosis, disc herniation, or other lesion is identified.     This report was finalized on 3/21/2016 11:38 AM by Dr. Oz Faustin MD.      PEG  "Assessment   What number best describes your pain on average in the past week?9  What number best describes how, during the past week, pain has interfered with your enjoyment of life?7  What number best describes how, during the past week, pain has interfered with your general activity?  9    The following portions of the patient's history were reviewed and updated as appropriate: allergies, current medications, past family history, past medical history, past social history, past surgical history and problem list.    Review of Systems   Constitutional: Positive for chills. Negative for activity change (restricted), fever and weight loss.   Respiratory: Negative for shortness of breath.    Cardiovascular: Negative for chest pain.   Gastrointestinal: Negative for abdominal pain, bowel incontinence, constipation, diarrhea, nausea and vomiting.   Genitourinary: Negative for bladder incontinence, difficulty urinating, dysuria, enuresis and pelvic pain.   Musculoskeletal: Positive for back pain (\"very controlled\").   Neurological: Positive for seizures (\"not since 30 years ago\"), weakness (legs bilat.), numbness (legs) and paresthesias. Negative for dizziness, tingling, light-headedness and headaches.   Psychiatric/Behavioral: Positive for sleep disturbance (occ). Negative for confusion, hallucinations, self-injury and suicidal ideas. The patient is not nervous/anxious.        Vitals:    05/21/18 1455   BP: 112/70   Pulse: 103   Resp: 16   Temp: 99.5 °F (37.5 °C)   SpO2: 94%   Height: 172.7 cm (67.99\")   PainSc: 2  Comment: R leg pain ranges from 2(sitting) to 9/10   PainLoc: Leg     Objective   Physical Exam   Constitutional: He is oriented to person, place, and time. Vital signs are normal. He appears well-developed and well-nourished. He is cooperative.   HENT:   Head: Normocephalic and atraumatic.   Nose: Nose normal.   Eyes: Conjunctivae and lids are normal.   Cardiovascular: Normal rate.    Pulmonary/Chest: Effort " normal.   Abdominal:   obese   Musculoskeletal:        Lumbar back: He exhibits tenderness, bony tenderness and pain.   Surgical scar of lumbar spine   Neurological: He is alert and oriented to person, place, and time. Gait (in wheelchair today) abnormal.   Reflex Scores:       Patellar reflexes are 0 on the right side and 0 on the left side.       Achilles reflexes are 0 on the right side and 0 on the left side.  dysthesias of BLE's   Skin: Skin is warm, dry and intact.   Psychiatric: He has a normal mood and affect. His speech is normal and behavior is normal. Judgment and thought content normal. Cognition and memory are normal.   Nursing note and vitals reviewed.      Assessment/Plan   Chava was seen today for leg pain.    Diagnoses and all orders for this visit:    Other chronic pain    Postlaminectomy syndrome of lumbar region    Spinal stenosis of lumbar region, unspecified whether neurogenic claudication present    Lumbar radiculopathy    Pain in both lower extremities    Spinal cord stimulator status      --- Reviewed cymbalta is 120 mg/day maximum.  --- Refill Cymbalta, Lyrica.   --- Ct- Lumbar.  --- Reprogramming with BS.   --- Consider [facility] referral, patient wants to wait at this time.   --- Follow-up 4-6 weeks or sooner if needed.       ROXANA REPORT    As part of the patient's treatment plan, I am prescribing controlled substances. The patient has been made aware of appropriate use of such medications, including potential risk of somnolence, limited ability to drive and/or work safely, and the potential for dependence or overdose. It has also bee made clear that these medications are for use by this patient only, without concomitant use of alcohol or other substances unless prescribed.     Patient has completed prescribing agreement detailing terms of continued prescribing of controlled substances, including monitoring ROXANA reports, urine drug screening, and pill counts if necessary. The patient is  aware that inappropriate use will results in cessation of prescribing such medications.    ROXANA report has been reviewed and scanned into the patient's chart.    As the clinician, I personally reviewed the ROXANA from 5-18-18 while the patient was in the office today.    History and physical exam exhibit continued safe and appropriate use of controlled substances.      EMR Dragon/Transcription disclaimer:   Much of this encounter note is an electronic transcription/translation of spoken language to printed text. The electronic translation of spoken language may permit erroneous, or at times, nonsensical words or phrases to be inadvertently transcribed; Although I have reviewed the note for such errors, some may still exist.

## 2018-05-30 ENCOUNTER — APPOINTMENT (OUTPATIENT)
Dept: CT IMAGING | Facility: HOSPITAL | Age: 68
End: 2018-05-30

## 2018-06-05 ENCOUNTER — HOSPITAL ENCOUNTER (OUTPATIENT)
Dept: CT IMAGING | Facility: HOSPITAL | Age: 68
Discharge: HOME | End: 2018-06-05
Admitting: NURSE PRACTITIONER

## 2018-06-05 DIAGNOSIS — M54.16 LUMBAR RADICULOPATHY: ICD-10-CM

## 2018-06-05 DIAGNOSIS — M48.061 SPINAL STENOSIS OF LUMBAR REGION, UNSPECIFIED WHETHER NEUROGENIC CLAUDICATION PRESENT: ICD-10-CM

## 2018-06-05 PROCEDURE — 72131 CT LUMBAR SPINE W/O DYE: CPT

## 2018-06-07 ENCOUNTER — DOCUMENTATION (OUTPATIENT)
Dept: PAIN MEDICINE | Facility: CLINIC | Age: 68
End: 2018-06-07

## 2018-06-07 DIAGNOSIS — Z45.42 BATTERY END OF LIFE OF SPINAL CORD STIMULATOR: Primary | ICD-10-CM

## 2018-06-07 NOTE — PROGRESS NOTES
Patient was in office yesterday for reprogramming of BS SCS. Michelle from  was present. The patient's SCS was unable to connect with Michelle's technology. It is no longer functioning. Will try to obtain battery revision. Orders. Noted.

## 2018-07-01 RX ORDER — LISINOPRIL AND HYDROCHLOROTHIAZIDE 20; 12.5 MG/1; MG/1
TABLET ORAL
Qty: 90 TABLET | Refills: 0 | Status: SHIPPED | OUTPATIENT
Start: 2018-07-01 | End: 2018-08-21

## 2018-07-24 RX ORDER — DULOXETIN HYDROCHLORIDE 60 MG/1
CAPSULE, DELAYED RELEASE ORAL
Qty: 60 CAPSULE | Refills: 3 | Status: SHIPPED | OUTPATIENT
Start: 2018-07-24 | End: 2018-08-21

## 2018-08-15 PROBLEM — Z45.42 BATTERY END OF LIFE OF SPINAL CORD STIMULATOR: Status: ACTIVE | Noted: 2018-08-15

## 2018-08-21 ENCOUNTER — APPOINTMENT (OUTPATIENT)
Dept: PREADMISSION TESTING | Facility: HOSPITAL | Age: 68
End: 2018-08-21

## 2018-08-21 VITALS
DIASTOLIC BLOOD PRESSURE: 88 MMHG | TEMPERATURE: 97.3 F | SYSTOLIC BLOOD PRESSURE: 137 MMHG | HEIGHT: 68 IN | WEIGHT: 268 LBS | RESPIRATION RATE: 20 BRPM | BODY MASS INDEX: 40.62 KG/M2 | HEART RATE: 83 BPM | OXYGEN SATURATION: 94 %

## 2018-08-21 LAB
ANION GAP SERPL CALCULATED.3IONS-SCNC: 11.3 MMOL/L
BUN BLD-MCNC: 37 MG/DL (ref 8–23)
BUN/CREAT SERPL: 19.7 (ref 7–25)
CALCIUM SPEC-SCNC: 8.6 MG/DL (ref 8.6–10.5)
CHLORIDE SERPL-SCNC: 100 MMOL/L (ref 98–107)
CO2 SERPL-SCNC: 26.7 MMOL/L (ref 22–29)
CREAT BLD-MCNC: 1.88 MG/DL (ref 0.76–1.27)
DEPRECATED RDW RBC AUTO: 51.1 FL (ref 37–54)
ERYTHROCYTE [DISTWIDTH] IN BLOOD BY AUTOMATED COUNT: 14.3 % (ref 11.5–14.5)
GFR SERPL CREATININE-BSD FRML MDRD: 36 ML/MIN/1.73
GLUCOSE BLD-MCNC: 92 MG/DL (ref 65–99)
HCT VFR BLD AUTO: 38 % (ref 40.4–52.2)
HGB BLD-MCNC: 12.2 G/DL (ref 13.7–17.6)
MCH RBC QN AUTO: 31.3 PG (ref 27–32.7)
MCHC RBC AUTO-ENTMCNC: 32.1 G/DL (ref 32.6–36.4)
MCV RBC AUTO: 97.4 FL (ref 79.8–96.2)
PLATELET # BLD AUTO: 168 10*3/MM3 (ref 140–500)
PMV BLD AUTO: 9.9 FL (ref 6–12)
POTASSIUM BLD-SCNC: 4.5 MMOL/L (ref 3.5–5.2)
RBC # BLD AUTO: 3.9 10*6/MM3 (ref 4.6–6)
SODIUM BLD-SCNC: 138 MMOL/L (ref 136–145)
WBC NRBC COR # BLD: 6.41 10*3/MM3 (ref 4.5–10.7)

## 2018-08-21 PROCEDURE — 36415 COLL VENOUS BLD VENIPUNCTURE: CPT

## 2018-08-21 PROCEDURE — 85027 COMPLETE CBC AUTOMATED: CPT | Performed by: ANESTHESIOLOGY

## 2018-08-21 PROCEDURE — 80048 BASIC METABOLIC PNL TOTAL CA: CPT | Performed by: ANESTHESIOLOGY

## 2018-08-21 PROCEDURE — 93005 ELECTROCARDIOGRAM TRACING: CPT

## 2018-08-21 PROCEDURE — 93010 ELECTROCARDIOGRAM REPORT: CPT | Performed by: INTERNAL MEDICINE

## 2018-08-21 RX ORDER — PHENYTOIN SODIUM 100 MG/1
500 CAPSULE, EXTENDED RELEASE ORAL NIGHTLY
COMMUNITY
End: 2019-01-31 | Stop reason: SDUPTHER

## 2018-08-21 RX ORDER — LEVOTHYROXINE SODIUM 88 UG/1
88 TABLET ORAL DAILY
COMMUNITY
End: 2018-09-12 | Stop reason: SDUPTHER

## 2018-08-21 RX ORDER — PREGABALIN 150 MG/1
300 CAPSULE ORAL 2 TIMES DAILY
Status: ON HOLD | COMMUNITY
End: 2018-10-30

## 2018-08-21 RX ORDER — LISINOPRIL AND HYDROCHLOROTHIAZIDE 20; 12.5 MG/1; MG/1
1 TABLET ORAL DAILY
COMMUNITY
End: 2018-09-12 | Stop reason: SDUPTHER

## 2018-08-21 RX ORDER — DULOXETIN HYDROCHLORIDE 60 MG/1
120 CAPSULE, DELAYED RELEASE ORAL NIGHTLY
COMMUNITY
End: 2019-01-15 | Stop reason: SDUPTHER

## 2018-08-21 NOTE — DISCHARGE INSTRUCTIONS
Take the following medications the morning of surgery with a small sip of water:  BRING LISINOPRIL WITH YOU      General Instructions:  • Do not eat solid food after midnight the night before surgery.  • You may drink clear liquids day of surgery but must stop at least one hour before your hospital arrival time.  • It is beneficial for you to have a clear drink that contains carbohydrates the day of surgery.  We suggest a 12 to 20 ounce bottle of Gatorade or Powerade for non-diabetic patients or a 12 to 20 ounce bottle of G2 or Powerade Zero for diabetic patients. (Pediatric patients, are not advised to drink a 12 to 20 ounce carbohydrate drink)    Clear liquids are liquids you can see through.  Nothing red in color.     Plain water                               Sports drinks  Sodas                                   Gelatin (Jell-O)  Fruit juices without pulp such as white grape juice and apple juice  Popsicles that contain no fruit or yogurt  Tea or coffee (no cream or milk added)  Gatorade / Powerade  G2 / Powerade Zero    • Infants may have breast milk up to four hours before surgery.  • Infants drinking formula may drink formula up to six hours before surgery.   • Patients who avoid smoking, chewing tobacco and alcohol for 4 weeks prior to surgery have a reduced risk of post-operative complications.  Quit smoking as many days before surgery as you can.  • Do not smoke, use chewing tobacco or drink alcohol the day of surgery.   • If applicable bring your C-PAP/ BI-PAP machine.  • Bring any papers given to you in the doctor’s office.  • Wear clean comfortable clothes and socks.  • Do not wear contact lenses or make-up.  Bring a case for your glasses.   • Bring crutches or walker if applicable.  • Remove all piercings.  Leave jewelry and any other valuables at home.  • Hair extensions with metal clips must be removed prior to surgery.  • The Pre-Admission Testing nurse will instruct you to bring medications if  unable to obtain an accurate list in Pre-Admission Testing.        If you were given a blood bank ID arm band remember to bring it with you the day of surgery.    Preventing a Surgical Site Infection:  • For 2 to 3 days before surgery, avoid shaving with a razor because the razor can irritate skin and make it easier to develop an infection.    • Any areas of open skin can increase the risk of a post-operative wound infection by allowing bacteria to enter and travel throughout the body.  Notify your surgeon if you have any skin wounds / rashes even if it is not near the expected surgical site.  The area will need assessed to determine if surgery should be delayed until it is healed.  • The night prior to surgery sleep in a clean bed with clean clothing.  Do not allow pets to sleep with you.  • Shower on the morning of surgery using a fresh bar of anti-bacterial soap (such as Dial) and clean washcloth.  Dry with a clean towel and dress in clean clothing.  • Ask your surgeon if you will be receiving antibiotics prior to surgery.  • Make sure you, your family, and all healthcare providers clean their hands with soap and water or an alcohol based hand  before caring for you or your wound.    Day of surgery: 8/27/2018 ARRIVAL TIME 9:30 AM  Upon arrival, a Pre-op nurse and Anesthesiologist will review your health history, obtain vital signs, and answer questions you may have.  The only belongings needed at this time will be your home medications and if applicable your C-PAP/BI-PAP machine.  If you are staying overnight your family can leave the rest of your belongings in the car and bring them to your room later.  A Pre-op nurse will start an IV and you may receive medication in preparation for surgery, including something to help you relax.  Your family will be able to see you in the Pre-op area.  While you are in surgery your family should notify the waiting room  if they leave the waiting room area  and provide a contact phone number.    Please be aware that surgery does come with discomfort.  We want to make every effort to control your discomfort so please discuss any uncontrolled symptoms with your nurse.   Your doctor will most likely have prescribed pain medications.      If you are going home after surgery you will receive individualized written care instructions before being discharged.  A responsible adult must drive you to and from the hospital on the day of your surgery and stay with you for 24 hours.    If you are staying overnight following surgery, you will be transported to your hospital room following the recovery period.  [facility] has all private rooms.    You have received a list of surgical assistants for your reference.  If you have any questions please call Pre-Admission Testing at [phone].  Deductibles and co-payments are collected on the day of service. Please be prepared to pay the required co-pay, deductible or deposit on the day of service as defined by your plan.

## 2018-08-26 PROCEDURE — S0260 H&P FOR SURGERY: HCPCS | Performed by: ANESTHESIOLOGY

## 2018-08-26 RX ORDER — SODIUM CHLORIDE, SODIUM LACTATE, POTASSIUM CHLORIDE, CALCIUM CHLORIDE 600; 310; 30; 20 MG/100ML; MG/100ML; MG/100ML; MG/100ML
50 INJECTION, SOLUTION INTRAVENOUS CONTINUOUS
Status: DISCONTINUED | OUTPATIENT
Start: 2018-08-26 | End: 2018-08-27 | Stop reason: HOSPADM

## 2018-08-26 RX ORDER — SODIUM CHLORIDE 0.9 % (FLUSH) 0.9 %
1-10 SYRINGE (ML) INJECTION AS NEEDED
Status: DISCONTINUED | OUTPATIENT
Start: 2018-08-26 | End: 2018-08-27 | Stop reason: HOSPADM

## 2018-08-27 ENCOUNTER — ANESTHESIA (OUTPATIENT)
Dept: PERIOP | Facility: HOSPITAL | Age: 68
End: 2018-08-27

## 2018-08-27 ENCOUNTER — ANESTHESIA EVENT (OUTPATIENT)
Dept: PERIOP | Facility: HOSPITAL | Age: 68
End: 2018-08-27

## 2018-08-27 ENCOUNTER — HOSPITAL ENCOUNTER (OUTPATIENT)
Facility: HOSPITAL | Age: 68
Setting detail: HOSPITAL OUTPATIENT SURGERY
Discharge: HOME | End: 2018-08-27
Attending: ANESTHESIOLOGY | Admitting: ANESTHESIOLOGY

## 2018-08-27 ENCOUNTER — APPOINTMENT (OUTPATIENT)
Dept: GENERAL RADIOLOGY | Facility: HOSPITAL | Age: 68
End: 2018-08-27

## 2018-08-27 VITALS
OXYGEN SATURATION: 95 % | HEART RATE: 78 BPM | TEMPERATURE: 99.1 F | RESPIRATION RATE: 16 BRPM | BODY MASS INDEX: 37.59 KG/M2 | DIASTOLIC BLOOD PRESSURE: 78 MMHG | WEIGHT: 248.02 LBS | HEIGHT: 68 IN | SYSTOLIC BLOOD PRESSURE: 113 MMHG

## 2018-08-27 PROCEDURE — 25010000002 PROPOFOL 10 MG/ML EMULSION: Performed by: NURSE ANESTHETIST, CERTIFIED REGISTERED

## 2018-08-27 PROCEDURE — 63685 INS/RPLC SPI NPG/RCVR POCKET: CPT | Performed by: ANESTHESIOLOGY

## 2018-08-27 PROCEDURE — 76000 FLUOROSCOPY <1 HR PHYS/QHP: CPT

## 2018-08-27 PROCEDURE — L8689 EXTERNAL RECHARG SYS INTERN: HCPCS | Performed by: ANESTHESIOLOGY

## 2018-08-27 PROCEDURE — 72020 X-RAY EXAM OF SPINE 1 VIEW: CPT

## 2018-08-27 PROCEDURE — C1787 PATIENT PROGR, NEUROSTIM: HCPCS | Performed by: ANESTHESIOLOGY

## 2018-08-27 PROCEDURE — C1820 GENERATOR NEURO RECHG BAT SY: HCPCS | Performed by: ANESTHESIOLOGY

## 2018-08-27 PROCEDURE — 25010000002 VANCOMYCIN 10 G RECONSTITUTED SOLUTION: Performed by: ANESTHESIOLOGY

## 2018-08-27 PROCEDURE — 25010000003 CEFAZOLIN IN DEXTROSE 2-4 GM/100ML-% SOLUTION: Performed by: NURSE ANESTHETIST, CERTIFIED REGISTERED

## 2018-08-27 DEVICE — IMPLANTABLE PULSE GENERATOR KIT
Type: IMPLANTABLE DEVICE | Site: BACK | Status: FUNCTIONAL
Brand: SPECTRA WAVEWRITER™

## 2018-08-27 RX ORDER — MIDAZOLAM HYDROCHLORIDE 1 MG/ML
1 INJECTION INTRAMUSCULAR; INTRAVENOUS
Status: DISCONTINUED | OUTPATIENT
Start: 2018-08-27 | End: 2018-08-27 | Stop reason: HOSPADM

## 2018-08-27 RX ORDER — CEFAZOLIN SODIUM 2 G/100ML
INJECTION, SOLUTION INTRAVENOUS AS NEEDED
Status: DISCONTINUED | OUTPATIENT
Start: 2018-08-27 | End: 2018-08-27 | Stop reason: SURG

## 2018-08-27 RX ORDER — BUPIVACAINE HYDROCHLORIDE AND EPINEPHRINE 2.5; 5 MG/ML; UG/ML
INJECTION, SOLUTION INFILTRATION; PERINEURAL AS NEEDED
Status: DISCONTINUED | OUTPATIENT
Start: 2018-08-27 | End: 2018-08-27 | Stop reason: HOSPADM

## 2018-08-27 RX ORDER — PROMETHAZINE HYDROCHLORIDE 25 MG/ML
12.5 INJECTION, SOLUTION INTRAMUSCULAR; INTRAVENOUS ONCE AS NEEDED
Status: DISCONTINUED | OUTPATIENT
Start: 2018-08-27 | End: 2018-08-27 | Stop reason: HOSPADM

## 2018-08-27 RX ORDER — PROPOFOL 10 MG/ML
VIAL (ML) INTRAVENOUS AS NEEDED
Status: DISCONTINUED | OUTPATIENT
Start: 2018-08-27 | End: 2018-08-27 | Stop reason: SURG

## 2018-08-27 RX ORDER — FENTANYL CITRATE 50 UG/ML
50 INJECTION, SOLUTION INTRAMUSCULAR; INTRAVENOUS
Status: DISCONTINUED | OUTPATIENT
Start: 2018-08-27 | End: 2018-08-27 | Stop reason: HOSPADM

## 2018-08-27 RX ORDER — SODIUM CHLORIDE 0.9 % (FLUSH) 0.9 %
1-10 SYRINGE (ML) INJECTION AS NEEDED
Status: DISCONTINUED | OUTPATIENT
Start: 2018-08-27 | End: 2018-08-27 | Stop reason: HOSPADM

## 2018-08-27 RX ORDER — ONDANSETRON 2 MG/ML
4 INJECTION INTRAMUSCULAR; INTRAVENOUS ONCE AS NEEDED
Status: DISCONTINUED | OUTPATIENT
Start: 2018-08-27 | End: 2018-08-27 | Stop reason: HOSPADM

## 2018-08-27 RX ORDER — PROMETHAZINE HYDROCHLORIDE 25 MG/1
25 TABLET ORAL ONCE AS NEEDED
Status: DISCONTINUED | OUTPATIENT
Start: 2018-08-27 | End: 2018-08-27 | Stop reason: HOSPADM

## 2018-08-27 RX ORDER — LABETALOL HYDROCHLORIDE 5 MG/ML
5 INJECTION, SOLUTION INTRAVENOUS
Status: DISCONTINUED | OUTPATIENT
Start: 2018-08-27 | End: 2018-08-27 | Stop reason: HOSPADM

## 2018-08-27 RX ORDER — OXYCODONE AND ACETAMINOPHEN 7.5; 325 MG/1; MG/1
1 TABLET ORAL ONCE AS NEEDED
Status: DISCONTINUED | OUTPATIENT
Start: 2018-08-27 | End: 2018-08-27 | Stop reason: HOSPADM

## 2018-08-27 RX ORDER — SODIUM CHLORIDE, SODIUM LACTATE, POTASSIUM CHLORIDE, CALCIUM CHLORIDE 600; 310; 30; 20 MG/100ML; MG/100ML; MG/100ML; MG/100ML
9 INJECTION, SOLUTION INTRAVENOUS CONTINUOUS
Status: DISCONTINUED | OUTPATIENT
Start: 2018-08-27 | End: 2018-08-27 | Stop reason: HOSPADM

## 2018-08-27 RX ORDER — HYDROCODONE BITARTRATE AND ACETAMINOPHEN 7.5; 325 MG/1; MG/1
1 TABLET ORAL ONCE AS NEEDED
Status: DISCONTINUED | OUTPATIENT
Start: 2018-08-27 | End: 2018-08-27 | Stop reason: HOSPADM

## 2018-08-27 RX ORDER — DIPHENHYDRAMINE HYDROCHLORIDE 50 MG/ML
12.5 INJECTION INTRAMUSCULAR; INTRAVENOUS
Status: DISCONTINUED | OUTPATIENT
Start: 2018-08-27 | End: 2018-08-27 | Stop reason: HOSPADM

## 2018-08-27 RX ORDER — MAGNESIUM HYDROXIDE 1200 MG/15ML
LIQUID ORAL AS NEEDED
Status: DISCONTINUED | OUTPATIENT
Start: 2018-08-27 | End: 2018-08-27 | Stop reason: HOSPADM

## 2018-08-27 RX ORDER — PROMETHAZINE HYDROCHLORIDE 25 MG/1
12.5 TABLET ORAL ONCE AS NEEDED
Status: DISCONTINUED | OUTPATIENT
Start: 2018-08-27 | End: 2018-08-27 | Stop reason: HOSPADM

## 2018-08-27 RX ORDER — FAMOTIDINE 10 MG/ML
20 INJECTION, SOLUTION INTRAVENOUS ONCE
Status: COMPLETED | OUTPATIENT
Start: 2018-08-27 | End: 2018-08-27

## 2018-08-27 RX ORDER — PROMETHAZINE HYDROCHLORIDE 25 MG/1
25 SUPPOSITORY RECTAL ONCE AS NEEDED
Status: DISCONTINUED | OUTPATIENT
Start: 2018-08-27 | End: 2018-08-27 | Stop reason: HOSPADM

## 2018-08-27 RX ORDER — PROPOFOL 10 MG/ML
VIAL (ML) INTRAVENOUS CONTINUOUS PRN
Status: DISCONTINUED | OUTPATIENT
Start: 2018-08-27 | End: 2018-08-27 | Stop reason: SURG

## 2018-08-27 RX ORDER — NALOXONE HCL 0.4 MG/ML
0.2 VIAL (ML) INJECTION AS NEEDED
Status: DISCONTINUED | OUTPATIENT
Start: 2018-08-27 | End: 2018-08-27 | Stop reason: HOSPADM

## 2018-08-27 RX ORDER — MIDAZOLAM HYDROCHLORIDE 1 MG/ML
2 INJECTION INTRAMUSCULAR; INTRAVENOUS
Status: DISCONTINUED | OUTPATIENT
Start: 2018-08-27 | End: 2018-08-27 | Stop reason: HOSPADM

## 2018-08-27 RX ORDER — EPHEDRINE SULFATE 50 MG/ML
5 INJECTION, SOLUTION INTRAVENOUS ONCE AS NEEDED
Status: DISCONTINUED | OUTPATIENT
Start: 2018-08-27 | End: 2018-08-27 | Stop reason: HOSPADM

## 2018-08-27 RX ORDER — FLUMAZENIL 0.1 MG/ML
0.2 INJECTION INTRAVENOUS AS NEEDED
Status: DISCONTINUED | OUTPATIENT
Start: 2018-08-27 | End: 2018-08-27 | Stop reason: HOSPADM

## 2018-08-27 RX ORDER — LIDOCAINE HYDROCHLORIDE 10 MG/ML
INJECTION, SOLUTION EPIDURAL; INFILTRATION; INTRACAUDAL; PERINEURAL AS NEEDED
Status: DISCONTINUED | OUTPATIENT
Start: 2018-08-27 | End: 2018-08-27 | Stop reason: HOSPADM

## 2018-08-27 RX ADMIN — SODIUM CHLORIDE, POTASSIUM CHLORIDE, SODIUM LACTATE AND CALCIUM CHLORIDE 9 ML/HR: 600; 310; 30; 20 INJECTION, SOLUTION INTRAVENOUS at 10:28

## 2018-08-27 RX ADMIN — CEFAZOLIN SODIUM 2 G: 2 INJECTION, SOLUTION INTRAVENOUS at 10:39

## 2018-08-27 RX ADMIN — SODIUM CHLORIDE, POTASSIUM CHLORIDE, SODIUM LACTATE AND CALCIUM CHLORIDE: 600; 310; 30; 20 INJECTION, SOLUTION INTRAVENOUS at 10:39

## 2018-08-27 RX ADMIN — PROPOFOL 100 MG: 10 INJECTION, EMULSION INTRAVENOUS at 10:43

## 2018-08-27 RX ADMIN — PROPOFOL 120 MCG/KG/MIN: 10 INJECTION, EMULSION INTRAVENOUS at 10:43

## 2018-08-27 RX ADMIN — FAMOTIDINE 20 MG: 10 INJECTION INTRAVENOUS at 10:28

## 2018-08-27 RX ADMIN — VANCOMYCIN HYDROCHLORIDE 1500 MG: 10 INJECTION, POWDER, LYOPHILIZED, FOR SOLUTION INTRAVENOUS at 10:21

## 2018-08-27 NOTE — ANESTHESIA PREPROCEDURE EVALUATION
Anesthesia Evaluation     no history of anesthetic complications:               Airway   Mallampati: I  no difficulty expected  Dental - normal exam     Pulmonary - negative pulmonary ROS and normal exam   (-) COPD, asthma, sleep apnea, not a smoker    ROS comment: Pulm Nodule  PE comment: nonlabored  Cardiovascular - normal exam    Rhythm: regular  Rate: normal    (+) hypertension 2 medications or greater,   (-) valvular problems/murmurs, past MI, CAD, dysrhythmias, angina      Neuro/Psych  (+) seizures (none for years) well controlled, numbness,     (-) TIA, CVA    ROS Comment: Peripheral Neuropathy  GI/Hepatic/Renal/Endo    (+) morbid obesity,  hypothyroidism,   (-) GERD, liver disease, diabetes, hyperthyroidism    Musculoskeletal     (+) back pain, chronic pain (SCS battery ),   Abdominal   (+) obese,    Substance History      OB/GYN          Other      history of cancer (Prostate)                    Anesthesia Plan    ASA 3     MAC     Anesthetic plan and risks discussed with patient.

## 2018-08-27 NOTE — ANESTHESIA POSTPROCEDURE EVALUATION
"Patient: Chava Stone Sr.    Procedure Summary     Date:  18 Room / Location:  [facility] OR  [facility] MAIN OR    Anesthesia Start:  1039 Anesthesia Stop:  1158    Procedure:  [place] [facility]- Spinal Cord Stimulator revision--battery  (N/A Back) Diagnosis:       Battery end of life of spinal cord stimulator      (Battery end of life of spinal cord stimulator [Z46.2])    Surgeon:  Ced Castro MD Provider:  Albaro Steele MD    Anesthesia Type:  MAC ASA Status:  3          Anesthesia Type: MAC  Last vitals  BP   123/72 (18 1230)   Temp   37.3 °C (99.1 °F) (18 1156)   Pulse   75 (18 1230)   Resp   16 (18 1230)     SpO2   94 % (18 1230)     Post Anesthesia Care and Evaluation    Patient location during evaluation: bedside  Patient participation: complete - patient participated  Level of consciousness: awake  Pain management: adequate  Airway patency: patent  Anesthetic complications: No anesthetic complications    Cardiovascular status: acceptable  Respiratory status: acceptable  Hydration status: acceptable    Comments: */72   Pulse 75   Temp 37.3 °C (99.1 °F) (Oral)   Resp 16   Ht 172.7 cm (68\")   Wt 113 kg (248 lb 0.3 oz)   SpO2 94%   BMI 37.71 kg/m²         "

## 2018-09-07 ENCOUNTER — OFFICE VISIT (OUTPATIENT)
Dept: PAIN MEDICINE | Facility: CLINIC | Age: 68
End: 2018-09-07

## 2018-09-07 VITALS
SYSTOLIC BLOOD PRESSURE: 128 MMHG | HEART RATE: 89 BPM | OXYGEN SATURATION: 92 % | TEMPERATURE: 97.6 F | DIASTOLIC BLOOD PRESSURE: 73 MMHG

## 2018-09-07 DIAGNOSIS — M96.1 POSTLAMINECTOMY SYNDROME OF LUMBAR REGION: ICD-10-CM

## 2018-09-07 DIAGNOSIS — G89.29 OTHER CHRONIC PAIN: Primary | ICD-10-CM

## 2018-09-07 DIAGNOSIS — Z96.89 SPINAL CORD STIMULATOR STATUS: ICD-10-CM

## 2018-09-07 PROCEDURE — 99024 POSTOP FOLLOW-UP VISIT: CPT | Performed by: NURSE PRACTITIONER

## 2018-09-07 NOTE — PROGRESS NOTES
"CHIEF COMPLAINT    Subjective   Chava Stone Sr. is a 67 y.o. male  who presents to the office for follow-up post SCS revision on 9/27/18 due to battery malfunction/end of life.  Doing well overall.  Little to no post procedural pain.  Will be meeting with [facility] today for reprogramming, hoping for some better right leg coverage.      History of Present Illness     PEG Assessment   What number best describes your pain on average in the past week?3  What number best describes how, during the past week, pain has interfered with your enjoyment of life?4  What number best describes how, during the past week, pain has interfered with your general activity?  4      The following portions of the patient's history were reviewed and updated as appropriate: allergies, current medications, past family history, past medical history, past social history, past surgical history and problem list.    Review of Systems   Constitutional: Negative for activity change (restricted), chills and fever.   Respiratory: Negative for shortness of breath.    Cardiovascular: Negative for chest pain.   Gastrointestinal: Negative for abdominal pain, constipation, diarrhea, nausea and vomiting.   Genitourinary: Negative for difficulty urinating, dysuria and enuresis.   Musculoskeletal: Positive for back pain (\"very controlled\").        Leg pain   Neurological: Positive for weakness (legs bilat.) and numbness (legs). Negative for dizziness, seizures (\"not since 30 years ago\"), light-headedness and headaches.   Psychiatric/Behavioral: Negative for confusion, hallucinations, self-injury, sleep disturbance (occ) and suicidal ideas. The patient is not nervous/anxious.        Vitals:    09/07/18 1135   BP: 128/73   BP Location: Right arm   Patient Position: Sitting   Cuff Size: Large Adult   Pulse: 89   Temp: 97.6 °F (36.4 °C)   TempSrc: Oral   SpO2: 92%   PainSc:   1   PainLoc: Leg         Objective   Physical Exam   Constitutional: He is " oriented to person, place, and time. He appears well-developed and well-nourished.   HENT:   Head: Normocephalic and atraumatic.   Eyes: Pupils are equal, round, and reactive to light. Conjunctivae and EOM are normal.   Neck: Neck supple.   Cardiovascular: Normal rate.    Pulmonary/Chest: Effort normal. No respiratory distress.   Musculoskeletal:        Lumbar back: He exhibits tenderness.   Neurological: He is alert and oriented to person, place, and time. Gait (wheelchair) abnormal.   Skin: Skin is warm and dry.        Psychiatric: He has a normal mood and affect. His behavior is normal. Judgment and thought content normal.   Nursing note and vitals reviewed.          Assessment/Plan   Chava was seen today for follow-up, leg pain and back pain.    Diagnoses and all orders for this visit:    Other chronic pain    Postlaminectomy syndrome of lumbar region    Spinal cord stimulator status        --- Reprogramming today with BS. I was not actively involved in this process  --- Follow-up 1 month          ROXANA REPORT  As part of the patient's treatment plan, I am prescribing controlled substances. The patient has been made aware of appropriate use of such medications, including potential risk of somnolence, limited ability to drive and/or work safely, and the potential for dependence or overdose. It has also bee made clear that these medications are for use by this patient only, without concomitant use of alcohol or other substances unless prescribed.     Patient has completed prescribing agreement detailing terms of continued prescribing of controlled substances, including monitoring ROXANA reports, urine drug screening, and pill counts if necessary. The patient is aware that inappropriate use will results in cessation of prescribing such medications.    ROXANA report has been reviewed and scanned into the patient's chart.    As the clinician, I personally reviewed the ROXANA from 9/6/2018 while the patient was in  the office today.    History and physical exam exhibit continued safe and appropriate use of controlled substances.    EMR Dragon/Transcription disclaimer:   Much of this encounter note is an electronic transcription/translation of spoken language to printed text. The electronic translation of spoken language may permit erroneous, or at times, nonsensical words or phrases to be inadvertently transcribed; Although I have reviewed the note for such errors, some may still exist.

## 2018-09-11 RX ORDER — LEVOTHYROXINE SODIUM 88 UG/1
TABLET ORAL
Qty: 90 TABLET | Refills: 0 | OUTPATIENT
Start: 2018-09-11

## 2018-09-12 RX ORDER — LEVOTHYROXINE SODIUM 88 UG/1
88 TABLET ORAL DAILY
Qty: 90 TABLET | Refills: 0 | Status: SHIPPED | OUTPATIENT
Start: 2018-09-12 | End: 2019-02-25 | Stop reason: SDUPTHER

## 2018-09-12 RX ORDER — LISINOPRIL AND HYDROCHLOROTHIAZIDE 20; 12.5 MG/1; MG/1
1 TABLET ORAL DAILY
Qty: 90 TABLET | Refills: 0 | Status: SHIPPED | OUTPATIENT
Start: 2018-09-12 | End: 2018-10-30 | Stop reason: HOSPADM

## 2018-10-21 ENCOUNTER — HOSPITAL ENCOUNTER (INPATIENT)
Facility: HOSPITAL | Age: 68
LOS: 9 days | Discharge: NURSING, REHAB OR LONG-TERM CARE | End: 2018-10-30
Attending: EMERGENCY MEDICINE | Admitting: INTERNAL MEDICINE

## 2018-10-21 ENCOUNTER — APPOINTMENT (OUTPATIENT)
Dept: GENERAL RADIOLOGY | Facility: HOSPITAL | Age: 68
End: 2018-10-21

## 2018-10-21 DIAGNOSIS — G89.29 CHRONIC MIDLINE LOW BACK PAIN WITHOUT SCIATICA: ICD-10-CM

## 2018-10-21 DIAGNOSIS — R53.1 WEAKNESS: Primary | ICD-10-CM

## 2018-10-21 DIAGNOSIS — M54.50 CHRONIC MIDLINE LOW BACK PAIN WITHOUT SCIATICA: ICD-10-CM

## 2018-10-21 DIAGNOSIS — R26.2 UNABLE TO WALK: ICD-10-CM

## 2018-10-21 LAB
ALBUMIN SERPL-MCNC: 3.9 G/DL (ref 3.5–5.2)
ALBUMIN/GLOB SERPL: 1 G/DL
ALP SERPL-CCNC: 101 U/L (ref 39–117)
ALT SERPL W P-5'-P-CCNC: 58 U/L (ref 1–41)
ANION GAP SERPL CALCULATED.3IONS-SCNC: 16.8 MMOL/L
AST SERPL-CCNC: 40 U/L (ref 1–40)
BASOPHILS # BLD AUTO: 0.04 10*3/MM3 (ref 0–0.2)
BASOPHILS NFR BLD AUTO: 0.6 % (ref 0–1.5)
BILIRUB SERPL-MCNC: 0.5 MG/DL (ref 0.1–1.2)
BUN BLD-MCNC: 34 MG/DL (ref 8–23)
BUN/CREAT SERPL: 23.3 (ref 7–25)
CALCIUM SPEC-SCNC: 9.9 MG/DL (ref 8.6–10.5)
CHLORIDE SERPL-SCNC: 102 MMOL/L (ref 98–107)
CK SERPL-CCNC: 155 U/L (ref 20–200)
CO2 SERPL-SCNC: 28.2 MMOL/L (ref 22–29)
CREAT BLD-MCNC: 1.46 MG/DL (ref 0.76–1.27)
DEPRECATED RDW RBC AUTO: 45.2 FL (ref 37–54)
EOSINOPHIL # BLD AUTO: 0.57 10*3/MM3 (ref 0–0.7)
EOSINOPHIL NFR BLD AUTO: 8.5 % (ref 0.3–6.2)
ERYTHROCYTE [DISTWIDTH] IN BLOOD BY AUTOMATED COUNT: 13.1 % (ref 11.5–14.5)
GFR SERPL CREATININE-BSD FRML MDRD: 48 ML/MIN/1.73
GLOBULIN UR ELPH-MCNC: 4.1 GM/DL
GLUCOSE BLD-MCNC: 111 MG/DL (ref 65–99)
HCT VFR BLD AUTO: 38.4 % (ref 40.4–52.2)
HGB BLD-MCNC: 12.6 G/DL (ref 13.7–17.6)
IMM GRANULOCYTES # BLD: 0.02 10*3/MM3 (ref 0–0.03)
IMM GRANULOCYTES NFR BLD: 0.3 % (ref 0–0.5)
LIPASE SERPL-CCNC: 51 U/L (ref 13–60)
LYMPHOCYTES # BLD AUTO: 2.07 10*3/MM3 (ref 0.9–4.8)
LYMPHOCYTES NFR BLD AUTO: 31 % (ref 19.6–45.3)
MAGNESIUM SERPL-MCNC: 2.1 MG/DL (ref 1.6–2.4)
MCH RBC QN AUTO: 31 PG (ref 27–32.7)
MCHC RBC AUTO-ENTMCNC: 32.8 G/DL (ref 32.6–36.4)
MCV RBC AUTO: 94.3 FL (ref 79.8–96.2)
MONOCYTES # BLD AUTO: 0.41 10*3/MM3 (ref 0.2–1.2)
MONOCYTES NFR BLD AUTO: 6.1 % (ref 5–12)
NEUTROPHILS # BLD AUTO: 3.59 10*3/MM3 (ref 1.9–8.1)
NEUTROPHILS NFR BLD AUTO: 53.8 % (ref 42.7–76)
NRBC BLD MANUAL-RTO: 0 /100 WBC (ref 0–0)
PHENYTOIN SERPL-MCNC: 3.7 MCG/ML (ref 10–20)
PHOSPHATE SERPL-MCNC: 3.8 MG/DL (ref 2.5–4.5)
PLATELET # BLD AUTO: 176 10*3/MM3 (ref 140–500)
PMV BLD AUTO: 10.5 FL (ref 6–12)
POTASSIUM BLD-SCNC: 4.6 MMOL/L (ref 3.5–5.2)
PROT SERPL-MCNC: 8 G/DL (ref 6–8.5)
RBC # BLD AUTO: 4.07 10*6/MM3 (ref 4.6–6)
SODIUM BLD-SCNC: 147 MMOL/L (ref 136–145)
T4 FREE SERPL-MCNC: 1.49 NG/DL (ref 0.93–1.7)
TROPONIN T SERPL-MCNC: 0.01 NG/ML (ref 0–0.03)
TSH SERPL DL<=0.05 MIU/L-ACNC: 2.62 MIU/ML (ref 0.27–4.2)
WBC NRBC COR # BLD: 6.68 10*3/MM3 (ref 4.5–10.7)

## 2018-10-21 PROCEDURE — 71045 X-RAY EXAM CHEST 1 VIEW: CPT

## 2018-10-21 PROCEDURE — G0378 HOSPITAL OBSERVATION PER HR: HCPCS

## 2018-10-21 PROCEDURE — 84100 ASSAY OF PHOSPHORUS: CPT | Performed by: EMERGENCY MEDICINE

## 2018-10-21 PROCEDURE — 84443 ASSAY THYROID STIM HORMONE: CPT | Performed by: EMERGENCY MEDICINE

## 2018-10-21 PROCEDURE — 82550 ASSAY OF CK (CPK): CPT | Performed by: EMERGENCY MEDICINE

## 2018-10-21 PROCEDURE — 93010 ELECTROCARDIOGRAM REPORT: CPT | Performed by: INTERNAL MEDICINE

## 2018-10-21 PROCEDURE — 84439 ASSAY OF FREE THYROXINE: CPT | Performed by: EMERGENCY MEDICINE

## 2018-10-21 PROCEDURE — 83690 ASSAY OF LIPASE: CPT | Performed by: EMERGENCY MEDICINE

## 2018-10-21 PROCEDURE — 93005 ELECTROCARDIOGRAM TRACING: CPT | Performed by: EMERGENCY MEDICINE

## 2018-10-21 PROCEDURE — 83735 ASSAY OF MAGNESIUM: CPT | Performed by: EMERGENCY MEDICINE

## 2018-10-21 PROCEDURE — 84484 ASSAY OF TROPONIN QUANT: CPT | Performed by: EMERGENCY MEDICINE

## 2018-10-21 PROCEDURE — 99285 EMERGENCY DEPT VISIT HI MDM: CPT

## 2018-10-21 PROCEDURE — 80053 COMPREHEN METABOLIC PANEL: CPT | Performed by: EMERGENCY MEDICINE

## 2018-10-21 PROCEDURE — 85025 COMPLETE CBC W/AUTO DIFF WBC: CPT | Performed by: EMERGENCY MEDICINE

## 2018-10-21 PROCEDURE — 80185 ASSAY OF PHENYTOIN TOTAL: CPT | Performed by: EMERGENCY MEDICINE

## 2018-10-21 RX ORDER — SODIUM CHLORIDE 0.9 % (FLUSH) 0.9 %
10 SYRINGE (ML) INJECTION AS NEEDED
Status: DISCONTINUED | OUTPATIENT
Start: 2018-10-21 | End: 2018-10-30 | Stop reason: HOSPADM

## 2018-10-21 RX ORDER — SODIUM CHLORIDE 9 MG/ML
75 INJECTION, SOLUTION INTRAVENOUS CONTINUOUS
Status: DISCONTINUED | OUTPATIENT
Start: 2018-10-21 | End: 2018-10-23

## 2018-10-21 RX ADMIN — SODIUM CHLORIDE 125 ML/HR: 9 INJECTION, SOLUTION INTRAVENOUS at 18:47

## 2018-10-21 RX ADMIN — SODIUM CHLORIDE, POTASSIUM CHLORIDE, SODIUM LACTATE AND CALCIUM CHLORIDE 1000 ML: 600; 310; 30; 20 INJECTION, SOLUTION INTRAVENOUS at 14:14

## 2018-10-22 PROBLEM — N17.9 AKI (ACUTE KIDNEY INJURY) (HCC): Status: ACTIVE | Noted: 2018-10-22

## 2018-10-22 PROBLEM — E86.0 DEHYDRATION: Status: ACTIVE | Noted: 2018-10-22

## 2018-10-22 PROBLEM — R19.7 DIARRHEA: Status: ACTIVE | Noted: 2018-10-22

## 2018-10-22 PROBLEM — G62.9 PERIPHERAL NEUROPATHY: Status: ACTIVE | Noted: 2018-10-22

## 2018-10-22 LAB
ALBUMIN SERPL-MCNC: 3.4 G/DL (ref 3.5–5.2)
ALBUMIN/GLOB SERPL: 0.9 G/DL
ALP SERPL-CCNC: 91 U/L (ref 39–117)
ALT SERPL W P-5'-P-CCNC: 52 U/L (ref 1–41)
ANION GAP SERPL CALCULATED.3IONS-SCNC: 16.4 MMOL/L
AST SERPL-CCNC: 37 U/L (ref 1–40)
BASOPHILS # BLD AUTO: 0.04 10*3/MM3 (ref 0–0.2)
BASOPHILS NFR BLD AUTO: 0.5 % (ref 0–1.5)
BILIRUB SERPL-MCNC: 0.3 MG/DL (ref 0.1–1.2)
BUN BLD-MCNC: 27 MG/DL (ref 8–23)
BUN/CREAT SERPL: 21.3 (ref 7–25)
CALCIUM SPEC-SCNC: 9.1 MG/DL (ref 8.6–10.5)
CHLORIDE SERPL-SCNC: 103 MMOL/L (ref 98–107)
CHOLEST SERPL-MCNC: 185 MG/DL (ref 0–200)
CO2 SERPL-SCNC: 22.6 MMOL/L (ref 22–29)
CREAT BLD-MCNC: 1.27 MG/DL (ref 0.76–1.27)
DEPRECATED RDW RBC AUTO: 45.3 FL (ref 37–54)
EOSINOPHIL # BLD AUTO: 0.69 10*3/MM3 (ref 0–0.7)
EOSINOPHIL NFR BLD AUTO: 9.4 % (ref 0.3–6.2)
ERYTHROCYTE [DISTWIDTH] IN BLOOD BY AUTOMATED COUNT: 13.1 % (ref 11.5–14.5)
FOLATE SERPL-MCNC: 6.22 NG/ML (ref 4.78–24.2)
GFR SERPL CREATININE-BSD FRML MDRD: 56 ML/MIN/1.73
GLOBULIN UR ELPH-MCNC: 3.6 GM/DL
GLUCOSE BLD-MCNC: 81 MG/DL (ref 65–99)
HCT VFR BLD AUTO: 33.9 % (ref 40.4–52.2)
HDLC SERPL-MCNC: 39 MG/DL (ref 40–60)
HGB BLD-MCNC: 11 G/DL (ref 13.7–17.6)
IMM GRANULOCYTES # BLD: 0.04 10*3/MM3 (ref 0–0.03)
IMM GRANULOCYTES NFR BLD: 0.5 % (ref 0–0.5)
LDLC SERPL CALC-MCNC: 112 MG/DL (ref 0–100)
LDLC/HDLC SERPL: 2.86 {RATIO}
LYMPHOCYTES # BLD AUTO: 2.32 10*3/MM3 (ref 0.9–4.8)
LYMPHOCYTES NFR BLD AUTO: 31.6 % (ref 19.6–45.3)
MAGNESIUM SERPL-MCNC: 1.9 MG/DL (ref 1.6–2.4)
MCH RBC QN AUTO: 30.5 PG (ref 27–32.7)
MCHC RBC AUTO-ENTMCNC: 32.4 G/DL (ref 32.6–36.4)
MCV RBC AUTO: 93.9 FL (ref 79.8–96.2)
MONOCYTES # BLD AUTO: 0.65 10*3/MM3 (ref 0.2–1.2)
MONOCYTES NFR BLD AUTO: 8.8 % (ref 5–12)
NEUTROPHILS # BLD AUTO: 3.65 10*3/MM3 (ref 1.9–8.1)
NEUTROPHILS NFR BLD AUTO: 49.7 % (ref 42.7–76)
PHOSPHATE SERPL-MCNC: 3.6 MG/DL (ref 2.5–4.5)
PLATELET # BLD AUTO: 173 10*3/MM3 (ref 140–500)
PMV BLD AUTO: 10.2 FL (ref 6–12)
POTASSIUM BLD-SCNC: 3.8 MMOL/L (ref 3.5–5.2)
PROT SERPL-MCNC: 7 G/DL (ref 6–8.5)
RBC # BLD AUTO: 3.61 10*6/MM3 (ref 4.6–6)
SODIUM BLD-SCNC: 142 MMOL/L (ref 136–145)
TRIGL SERPL-MCNC: 172 MG/DL (ref 0–150)
VIT B12 BLD-MCNC: 505 PG/ML (ref 211–946)
VLDLC SERPL-MCNC: 34.4 MG/DL (ref 5–40)
WBC NRBC COR # BLD: 7.35 10*3/MM3 (ref 4.5–10.7)

## 2018-10-22 PROCEDURE — 82746 ASSAY OF FOLIC ACID SERUM: CPT | Performed by: PSYCHIATRY & NEUROLOGY

## 2018-10-22 PROCEDURE — 80053 COMPREHEN METABOLIC PANEL: CPT | Performed by: INTERNAL MEDICINE

## 2018-10-22 PROCEDURE — 85025 COMPLETE CBC W/AUTO DIFF WBC: CPT | Performed by: INTERNAL MEDICINE

## 2018-10-22 PROCEDURE — 99223 1ST HOSP IP/OBS HIGH 75: CPT | Performed by: PSYCHIATRY & NEUROLOGY

## 2018-10-22 PROCEDURE — 99221 1ST HOSP IP/OBS SF/LOW 40: CPT | Performed by: ANESTHESIOLOGY

## 2018-10-22 PROCEDURE — 82607 VITAMIN B-12: CPT | Performed by: PSYCHIATRY & NEUROLOGY

## 2018-10-22 PROCEDURE — 83735 ASSAY OF MAGNESIUM: CPT | Performed by: INTERNAL MEDICINE

## 2018-10-22 PROCEDURE — 84100 ASSAY OF PHOSPHORUS: CPT | Performed by: HOSPITALIST

## 2018-10-22 PROCEDURE — 80061 LIPID PANEL: CPT | Performed by: INTERNAL MEDICINE

## 2018-10-22 PROCEDURE — 25010000002 HEPARIN (PORCINE) PER 1000 UNITS: Performed by: INTERNAL MEDICINE

## 2018-10-22 RX ORDER — DIPHENOXYLATE HYDROCHLORIDE AND ATROPINE SULFATE 2.5; .025 MG/1; MG/1
1 TABLET ORAL ONCE
Status: COMPLETED | OUTPATIENT
Start: 2018-10-22 | End: 2018-10-22

## 2018-10-22 RX ORDER — MELATONIN
2000 DAILY
Status: DISCONTINUED | OUTPATIENT
Start: 2018-10-22 | End: 2018-10-30 | Stop reason: HOSPADM

## 2018-10-22 RX ORDER — LEVOTHYROXINE SODIUM 88 UG/1
88 TABLET ORAL
Status: DISCONTINUED | OUTPATIENT
Start: 2018-10-22 | End: 2018-10-30 | Stop reason: HOSPADM

## 2018-10-22 RX ORDER — PHENYTOIN SODIUM 100 MG/1
500 CAPSULE, EXTENDED RELEASE ORAL NIGHTLY
Status: DISCONTINUED | OUTPATIENT
Start: 2018-10-22 | End: 2018-10-30 | Stop reason: HOSPADM

## 2018-10-22 RX ORDER — HYDROCODONE BITARTRATE AND ACETAMINOPHEN 7.5; 325 MG/1; MG/1
1 TABLET ORAL EVERY 4 HOURS PRN
Status: DISCONTINUED | OUTPATIENT
Start: 2018-10-22 | End: 2018-10-30 | Stop reason: HOSPADM

## 2018-10-22 RX ORDER — HEPARIN SODIUM 5000 [USP'U]/ML
5000 INJECTION, SOLUTION INTRAVENOUS; SUBCUTANEOUS EVERY 12 HOURS SCHEDULED
Status: DISCONTINUED | OUTPATIENT
Start: 2018-10-22 | End: 2018-10-26

## 2018-10-22 RX ORDER — LISINOPRIL 20 MG/1
20 TABLET ORAL
Status: DISCONTINUED | OUTPATIENT
Start: 2018-10-22 | End: 2018-10-28

## 2018-10-22 RX ORDER — FINASTERIDE 5 MG/1
5 TABLET, FILM COATED ORAL DAILY
Status: DISCONTINUED | OUTPATIENT
Start: 2018-10-22 | End: 2018-10-30 | Stop reason: HOSPADM

## 2018-10-22 RX ORDER — PREGABALIN 100 MG/1
300 CAPSULE ORAL EVERY 12 HOURS SCHEDULED
Status: DISCONTINUED | OUTPATIENT
Start: 2018-10-22 | End: 2018-10-30 | Stop reason: HOSPADM

## 2018-10-22 RX ORDER — DULOXETIN HYDROCHLORIDE 60 MG/1
120 CAPSULE, DELAYED RELEASE ORAL NIGHTLY
Status: DISCONTINUED | OUTPATIENT
Start: 2018-10-22 | End: 2018-10-30 | Stop reason: HOSPADM

## 2018-10-22 RX ORDER — ONDANSETRON 2 MG/ML
4 INJECTION INTRAMUSCULAR; INTRAVENOUS EVERY 6 HOURS PRN
Status: DISCONTINUED | OUTPATIENT
Start: 2018-10-22 | End: 2018-10-30 | Stop reason: HOSPADM

## 2018-10-22 RX ORDER — SODIUM CHLORIDE 0.9 % (FLUSH) 0.9 %
3 SYRINGE (ML) INJECTION EVERY 12 HOURS SCHEDULED
Status: DISCONTINUED | OUTPATIENT
Start: 2018-10-22 | End: 2018-10-30 | Stop reason: HOSPADM

## 2018-10-22 RX ORDER — SODIUM CHLORIDE 0.9 % (FLUSH) 0.9 %
3-10 SYRINGE (ML) INJECTION AS NEEDED
Status: DISCONTINUED | OUTPATIENT
Start: 2018-10-22 | End: 2018-10-30 | Stop reason: HOSPADM

## 2018-10-22 RX ORDER — MULTIVIT-MIN/IRON/FOLIC ACID/K 18-600-40
2000 CAPSULE ORAL DAILY
Status: DISCONTINUED | OUTPATIENT
Start: 2018-10-22 | End: 2018-10-22

## 2018-10-22 RX ADMIN — Medication 3 ML: at 21:54

## 2018-10-22 RX ADMIN — HYDROCODONE BITARTRATE AND ACETAMINOPHEN 1 TABLET: 7.5; 325 TABLET ORAL at 02:47

## 2018-10-22 RX ADMIN — PREGABALIN 300 MG: 100 CAPSULE ORAL at 22:00

## 2018-10-22 RX ADMIN — HEPARIN SODIUM 5000 UNITS: 5000 INJECTION INTRAVENOUS; SUBCUTANEOUS at 01:15

## 2018-10-22 RX ADMIN — HYDROCODONE BITARTRATE AND ACETAMINOPHEN 1 TABLET: 7.5; 325 TABLET ORAL at 09:55

## 2018-10-22 RX ADMIN — PHENYTOIN SODIUM 500 MG: 100 CAPSULE, EXTENDED RELEASE ORAL at 21:55

## 2018-10-22 RX ADMIN — HEPARIN SODIUM 5000 UNITS: 5000 INJECTION INTRAVENOUS; SUBCUTANEOUS at 12:39

## 2018-10-22 RX ADMIN — HEPARIN SODIUM 5000 UNITS: 5000 INJECTION INTRAVENOUS; SUBCUTANEOUS at 21:56

## 2018-10-22 RX ADMIN — DULOXETINE HYDROCHLORIDE 120 MG: 60 CAPSULE, DELAYED RELEASE ORAL at 02:47

## 2018-10-22 RX ADMIN — LISINOPRIL 20 MG: 20 TABLET ORAL at 09:55

## 2018-10-22 RX ADMIN — LEVOTHYROXINE SODIUM 88 MCG: 88 TABLET ORAL at 07:12

## 2018-10-22 RX ADMIN — FINASTERIDE 5 MG: 5 TABLET, FILM COATED ORAL at 09:55

## 2018-10-22 RX ADMIN — PHENYTOIN SODIUM 500 MG: 100 CAPSULE, EXTENDED RELEASE ORAL at 02:47

## 2018-10-22 RX ADMIN — DIPHENOXYLATE HYDROCHLORIDE AND ATROPINE SULFATE 1 TABLET: 2.5; .025 TABLET ORAL at 12:39

## 2018-10-22 RX ADMIN — PREGABALIN 300 MG: 100 CAPSULE ORAL at 12:39

## 2018-10-22 RX ADMIN — VITAMIN D, TAB 1000IU (100/BT) 2000 UNITS: 25 TAB at 09:55

## 2018-10-22 RX ADMIN — Medication 3 ML: at 09:57

## 2018-10-22 RX ADMIN — DULOXETINE HYDROCHLORIDE 120 MG: 60 CAPSULE, DELAYED RELEASE ORAL at 21:55

## 2018-10-23 LAB
ANION GAP SERPL CALCULATED.3IONS-SCNC: 11.2 MMOL/L
BUN BLD-MCNC: 6 MG/DL (ref 8–23)
BUN/CREAT SERPL: 6.1 (ref 7–25)
CALCIUM SPEC-SCNC: 7.7 MG/DL (ref 8.6–10.5)
CHLORIDE SERPL-SCNC: 103 MMOL/L (ref 98–107)
CO2 SERPL-SCNC: 23.8 MMOL/L (ref 22–29)
CREAT BLD-MCNC: 0.98 MG/DL (ref 0.76–1.27)
DEPRECATED RDW RBC AUTO: 44.4 FL (ref 37–54)
ERYTHROCYTE [DISTWIDTH] IN BLOOD BY AUTOMATED COUNT: 12.9 % (ref 11.5–14.5)
GFR SERPL CREATININE-BSD FRML MDRD: 76 ML/MIN/1.73
GLUCOSE BLD-MCNC: 98 MG/DL (ref 65–99)
HCT VFR BLD AUTO: 32.1 % (ref 40.4–52.2)
HGB BLD-MCNC: 10.1 G/DL (ref 13.7–17.6)
IRON 24H UR-MRATE: 84 MCG/DL (ref 59–158)
IRON SATN MFR SERPL: 45 % (ref 20–50)
MCH RBC QN AUTO: 29.7 PG (ref 27–32.7)
MCHC RBC AUTO-ENTMCNC: 31.5 G/DL (ref 32.6–36.4)
MCV RBC AUTO: 94.4 FL (ref 79.8–96.2)
PLATELET # BLD AUTO: 143 10*3/MM3 (ref 140–500)
PMV BLD AUTO: 10.1 FL (ref 6–12)
POTASSIUM BLD-SCNC: 3.3 MMOL/L (ref 3.5–5.2)
RBC # BLD AUTO: 3.4 10*6/MM3 (ref 4.6–6)
SODIUM BLD-SCNC: 138 MMOL/L (ref 136–145)
TIBC SERPL-MCNC: 185 MCG/DL (ref 298–536)
TRANSFERRIN SERPL-MCNC: 124 MG/DL (ref 200–360)
WBC NRBC COR # BLD: 5.81 10*3/MM3 (ref 4.5–10.7)

## 2018-10-23 PROCEDURE — 99233 SBSQ HOSP IP/OBS HIGH 50: CPT | Performed by: NURSE PRACTITIONER

## 2018-10-23 PROCEDURE — 25010000002 HEPARIN (PORCINE) PER 1000 UNITS: Performed by: INTERNAL MEDICINE

## 2018-10-23 PROCEDURE — 97110 THERAPEUTIC EXERCISES: CPT | Performed by: PHYSICAL THERAPIST

## 2018-10-23 PROCEDURE — 83540 ASSAY OF IRON: CPT | Performed by: HOSPITALIST

## 2018-10-23 PROCEDURE — 84466 ASSAY OF TRANSFERRIN: CPT | Performed by: HOSPITALIST

## 2018-10-23 PROCEDURE — 97162 PT EVAL MOD COMPLEX 30 MIN: CPT | Performed by: PHYSICAL THERAPIST

## 2018-10-23 PROCEDURE — 85027 COMPLETE CBC AUTOMATED: CPT | Performed by: HOSPITALIST

## 2018-10-23 PROCEDURE — 80048 BASIC METABOLIC PNL TOTAL CA: CPT | Performed by: HOSPITALIST

## 2018-10-23 RX ORDER — POTASSIUM CHLORIDE 750 MG/1
20 CAPSULE, EXTENDED RELEASE ORAL 2 TIMES DAILY WITH MEALS
Status: COMPLETED | OUTPATIENT
Start: 2018-10-23 | End: 2018-10-23

## 2018-10-23 RX ADMIN — FINASTERIDE 5 MG: 5 TABLET, FILM COATED ORAL at 08:24

## 2018-10-23 RX ADMIN — PHENYTOIN SODIUM 500 MG: 100 CAPSULE, EXTENDED RELEASE ORAL at 21:44

## 2018-10-23 RX ADMIN — Medication 3 ML: at 21:44

## 2018-10-23 RX ADMIN — PREGABALIN 300 MG: 100 CAPSULE ORAL at 21:44

## 2018-10-23 RX ADMIN — DULOXETINE HYDROCHLORIDE 120 MG: 60 CAPSULE, DELAYED RELEASE ORAL at 21:44

## 2018-10-23 RX ADMIN — Medication 3 ML: at 08:25

## 2018-10-23 RX ADMIN — VITAMIN D, TAB 1000IU (100/BT) 2000 UNITS: 25 TAB at 08:24

## 2018-10-23 RX ADMIN — POTASSIUM CHLORIDE 20 MEQ: 750 CAPSULE, EXTENDED RELEASE ORAL at 10:13

## 2018-10-23 RX ADMIN — HEPARIN SODIUM 5000 UNITS: 5000 INJECTION INTRAVENOUS; SUBCUTANEOUS at 08:24

## 2018-10-23 RX ADMIN — HEPARIN SODIUM 5000 UNITS: 5000 INJECTION INTRAVENOUS; SUBCUTANEOUS at 21:43

## 2018-10-23 RX ADMIN — PREGABALIN 300 MG: 100 CAPSULE ORAL at 08:24

## 2018-10-23 RX ADMIN — POTASSIUM CHLORIDE 20 MEQ: 750 CAPSULE, EXTENDED RELEASE ORAL at 17:17

## 2018-10-23 RX ADMIN — LEVOTHYROXINE SODIUM 88 MCG: 88 TABLET ORAL at 07:54

## 2018-10-23 RX ADMIN — LISINOPRIL 20 MG: 20 TABLET ORAL at 08:24

## 2018-10-24 ENCOUNTER — APPOINTMENT (OUTPATIENT)
Dept: CT IMAGING | Facility: HOSPITAL | Age: 68
End: 2018-10-24

## 2018-10-24 LAB — HEMOCCULT STL QL: POSITIVE

## 2018-10-24 PROCEDURE — 72125 CT NECK SPINE W/O DYE: CPT

## 2018-10-24 PROCEDURE — 97110 THERAPEUTIC EXERCISES: CPT

## 2018-10-24 PROCEDURE — 87046 STOOL CULTR AEROBIC BACT EA: CPT | Performed by: HOSPITALIST

## 2018-10-24 PROCEDURE — 99232 SBSQ HOSP IP/OBS MODERATE 35: CPT | Performed by: NURSE PRACTITIONER

## 2018-10-24 PROCEDURE — 82272 OCCULT BLD FECES 1-3 TESTS: CPT | Performed by: HOSPITALIST

## 2018-10-24 PROCEDURE — G0009 ADMIN PNEUMOCOCCAL VACCINE: HCPCS | Performed by: HOSPITALIST

## 2018-10-24 PROCEDURE — 25010000002 HEPARIN (PORCINE) PER 1000 UNITS: Performed by: INTERNAL MEDICINE

## 2018-10-24 PROCEDURE — 72128 CT CHEST SPINE W/O DYE: CPT

## 2018-10-24 PROCEDURE — 90732 PPSV23 VACC 2 YRS+ SUBQ/IM: CPT | Performed by: HOSPITALIST

## 2018-10-24 PROCEDURE — 25010000002 PNEUMOCOCCAL VAC POLYVALENT PER 0.5 ML: Performed by: HOSPITALIST

## 2018-10-24 PROCEDURE — 72131 CT LUMBAR SPINE W/O DYE: CPT

## 2018-10-24 PROCEDURE — 87045 FECES CULTURE AEROBIC BACT: CPT | Performed by: HOSPITALIST

## 2018-10-24 RX ADMIN — LISINOPRIL 20 MG: 20 TABLET ORAL at 09:07

## 2018-10-24 RX ADMIN — PHENYTOIN SODIUM 500 MG: 100 CAPSULE, EXTENDED RELEASE ORAL at 21:25

## 2018-10-24 RX ADMIN — Medication 3 ML: at 09:07

## 2018-10-24 RX ADMIN — PREGABALIN 300 MG: 100 CAPSULE ORAL at 09:07

## 2018-10-24 RX ADMIN — HEPARIN SODIUM 5000 UNITS: 5000 INJECTION INTRAVENOUS; SUBCUTANEOUS at 21:24

## 2018-10-24 RX ADMIN — VITAMIN D, TAB 1000IU (100/BT) 2000 UNITS: 25 TAB at 09:07

## 2018-10-24 RX ADMIN — HEPARIN SODIUM 5000 UNITS: 5000 INJECTION INTRAVENOUS; SUBCUTANEOUS at 09:08

## 2018-10-24 RX ADMIN — PNEUMOCOCCAL VACCINE POLYVALENT 0.5 ML
25; 25; 25; 25; 25; 25; 25; 25; 25; 25; 25; 25; 25; 25; 25; 25; 25; 25; 25; 25; 25; 25; 25 INJECTION, SOLUTION INTRAMUSCULAR; SUBCUTANEOUS at 13:30

## 2018-10-24 RX ADMIN — FINASTERIDE 5 MG: 5 TABLET, FILM COATED ORAL at 09:07

## 2018-10-24 RX ADMIN — LEVOTHYROXINE SODIUM 88 MCG: 88 TABLET ORAL at 06:46

## 2018-10-24 RX ADMIN — Medication 3 ML: at 21:25

## 2018-10-24 RX ADMIN — DULOXETINE HYDROCHLORIDE 120 MG: 60 CAPSULE, DELAYED RELEASE ORAL at 21:24

## 2018-10-24 RX ADMIN — PREGABALIN 300 MG: 100 CAPSULE ORAL at 21:25

## 2018-10-25 ENCOUNTER — APPOINTMENT (OUTPATIENT)
Dept: GENERAL RADIOLOGY | Facility: HOSPITAL | Age: 68
End: 2018-10-25
Attending: HOSPITALIST

## 2018-10-25 ENCOUNTER — APPOINTMENT (OUTPATIENT)
Dept: CT IMAGING | Facility: HOSPITAL | Age: 68
End: 2018-10-25

## 2018-10-25 PROBLEM — E87.6 HYPOPOTASSEMIA: Status: ACTIVE | Noted: 2018-10-25

## 2018-10-25 LAB
ANION GAP SERPL CALCULATED.3IONS-SCNC: 8.5 MMOL/L
BUN BLD-MCNC: 13 MG/DL (ref 8–23)
BUN/CREAT SERPL: 10.8 (ref 7–25)
C DIFF TOX GENS STL QL NAA+PROBE: POSITIVE
CALCIUM SPEC-SCNC: 8.8 MG/DL (ref 8.6–10.5)
CHLORIDE SERPL-SCNC: 103 MMOL/L (ref 98–107)
CO2 SERPL-SCNC: 26.5 MMOL/L (ref 22–29)
CREAT BLD-MCNC: 1.2 MG/DL (ref 0.76–1.27)
GFR SERPL CREATININE-BSD FRML MDRD: 60 ML/MIN/1.73
GLUCOSE BLD-MCNC: 114 MG/DL (ref 65–99)
POTASSIUM BLD-SCNC: 4 MMOL/L (ref 3.5–5.2)
PSA SERPL-MCNC: 1.05 NG/ML (ref 0–4)
SODIUM BLD-SCNC: 138 MMOL/L (ref 136–145)

## 2018-10-25 PROCEDURE — 83883 ASSAY NEPHELOMETRY NOT SPEC: CPT | Performed by: INTERNAL MEDICINE

## 2018-10-25 PROCEDURE — 87493 C DIFF AMPLIFIED PROBE: CPT | Performed by: HOSPITALIST

## 2018-10-25 PROCEDURE — 25010000002 HEPARIN (PORCINE) PER 1000 UNITS: Performed by: INTERNAL MEDICINE

## 2018-10-25 PROCEDURE — 86334 IMMUNOFIX E-PHORESIS SERUM: CPT | Performed by: INTERNAL MEDICINE

## 2018-10-25 PROCEDURE — 0 DIATRIZOATE MEGLUMINE & SODIUM PER 1 ML: Performed by: INTERNAL MEDICINE

## 2018-10-25 PROCEDURE — 84165 PROTEIN E-PHORESIS SERUM: CPT | Performed by: INTERNAL MEDICINE

## 2018-10-25 PROCEDURE — 84153 ASSAY OF PSA TOTAL: CPT | Performed by: INTERNAL MEDICINE

## 2018-10-25 PROCEDURE — 99222 1ST HOSP IP/OBS MODERATE 55: CPT | Performed by: INTERNAL MEDICINE

## 2018-10-25 PROCEDURE — 77075 RADEX OSSEOUS SURVEY COMPL: CPT

## 2018-10-25 PROCEDURE — 80048 BASIC METABOLIC PNL TOTAL CA: CPT | Performed by: HOSPITALIST

## 2018-10-25 PROCEDURE — 99223 1ST HOSP IP/OBS HIGH 75: CPT | Performed by: INTERNAL MEDICINE

## 2018-10-25 PROCEDURE — 71260 CT THORAX DX C+: CPT

## 2018-10-25 PROCEDURE — 82784 ASSAY IGA/IGD/IGG/IGM EACH: CPT | Performed by: INTERNAL MEDICINE

## 2018-10-25 PROCEDURE — 74177 CT ABD & PELVIS W/CONTRAST: CPT

## 2018-10-25 PROCEDURE — 97110 THERAPEUTIC EXERCISES: CPT

## 2018-10-25 PROCEDURE — 86316 IMMUNOASSAY TUMOR OTHER: CPT | Performed by: INTERNAL MEDICINE

## 2018-10-25 PROCEDURE — 84155 ASSAY OF PROTEIN SERUM: CPT | Performed by: INTERNAL MEDICINE

## 2018-10-25 PROCEDURE — 25010000002 IOPAMIDOL 61 % SOLUTION: Performed by: HOSPITALIST

## 2018-10-25 RX ADMIN — PHENYTOIN SODIUM 500 MG: 100 CAPSULE, EXTENDED RELEASE ORAL at 21:45

## 2018-10-25 RX ADMIN — HEPARIN SODIUM 5000 UNITS: 5000 INJECTION INTRAVENOUS; SUBCUTANEOUS at 21:46

## 2018-10-25 RX ADMIN — HYDROCODONE BITARTRATE AND ACETAMINOPHEN 1 TABLET: 7.5; 325 TABLET ORAL at 21:48

## 2018-10-25 RX ADMIN — VITAMIN D, TAB 1000IU (100/BT) 2000 UNITS: 25 TAB at 09:35

## 2018-10-25 RX ADMIN — Medication 3 ML: at 21:47

## 2018-10-25 RX ADMIN — LISINOPRIL 20 MG: 20 TABLET ORAL at 09:35

## 2018-10-25 RX ADMIN — DULOXETINE HYDROCHLORIDE 120 MG: 60 CAPSULE, DELAYED RELEASE ORAL at 23:17

## 2018-10-25 RX ADMIN — VANCOMYCIN 125 MG: KIT at 23:19

## 2018-10-25 RX ADMIN — DIATRIZOATE MEGLUMINE AND DIATRIZOATE SODIUM 30 ML: 600; 100 SOLUTION ORAL; RECTAL at 15:33

## 2018-10-25 RX ADMIN — IOPAMIDOL 85 ML: 612 INJECTION, SOLUTION INTRAVENOUS at 17:07

## 2018-10-25 RX ADMIN — HEPARIN SODIUM 5000 UNITS: 5000 INJECTION INTRAVENOUS; SUBCUTANEOUS at 09:36

## 2018-10-25 RX ADMIN — VANCOMYCIN 125 MG: KIT at 19:14

## 2018-10-25 RX ADMIN — PREGABALIN 300 MG: 100 CAPSULE ORAL at 21:45

## 2018-10-25 RX ADMIN — LEVOTHYROXINE SODIUM 88 MCG: 88 TABLET ORAL at 06:35

## 2018-10-25 RX ADMIN — VANCOMYCIN 125 MG: KIT at 15:33

## 2018-10-25 RX ADMIN — FINASTERIDE 5 MG: 5 TABLET, FILM COATED ORAL at 09:36

## 2018-10-25 RX ADMIN — PREGABALIN 300 MG: 100 CAPSULE ORAL at 09:35

## 2018-10-26 ENCOUNTER — APPOINTMENT (OUTPATIENT)
Dept: CARDIOLOGY | Facility: HOSPITAL | Age: 68
End: 2018-10-26
Attending: INTERNAL MEDICINE

## 2018-10-26 PROBLEM — I26.99 PULMONARY EMBOLUS (HCC): Status: ACTIVE | Noted: 2018-10-26

## 2018-10-26 PROBLEM — A04.72 C. DIFFICILE COLITIS: Status: ACTIVE | Noted: 2018-10-26

## 2018-10-26 LAB
ALBUMIN SERPL-MCNC: 2.8 G/DL (ref 2.9–4.4)
ALBUMIN/GLOB SERPL: 0.9 {RATIO} (ref 0.7–1.7)
ALPHA1 GLOB FLD ELPH-MCNC: 0.3 G/DL (ref 0–0.4)
ALPHA2 GLOB SERPL ELPH-MCNC: 1 G/DL (ref 0.4–1)
B-GLOBULIN SERPL ELPH-MCNC: 0.7 G/DL (ref 0.7–1.3)
BH CV LOW VAS RIGHT COMMON FEMORAL SPONT: 1
BH CV LOW VAS RIGHT GASTRONEMIUS VESSEL: 1
BH CV LOW VAS RIGHT PROFUNDA FEMORAL SPONT: 1
BH CV LOW VAS RIGHT PROXIMAL FEMORAL SPONT: 1
BH CV LOW VAS RIGHT SAPHENOFEMORAL JUNCTION SPONT: 1
BH CV LOWER VASCULAR LEFT COMMON FEMORAL AUGMENT: NORMAL
BH CV LOWER VASCULAR LEFT COMMON FEMORAL COMPETENT: NORMAL
BH CV LOWER VASCULAR LEFT COMMON FEMORAL COMPRESS: NORMAL
BH CV LOWER VASCULAR LEFT COMMON FEMORAL PHASIC: NORMAL
BH CV LOWER VASCULAR LEFT COMMON FEMORAL SPONT: NORMAL
BH CV LOWER VASCULAR LEFT DISTAL FEMORAL COMPRESS: NORMAL
BH CV LOWER VASCULAR LEFT GASTRONEMIUS COMPRESS: NORMAL
BH CV LOWER VASCULAR LEFT GREATER SAPH AK COMPRESS: NORMAL
BH CV LOWER VASCULAR LEFT GREATER SAPH BK COMPRESS: NORMAL
BH CV LOWER VASCULAR LEFT MID FEMORAL AUGMENT: NORMAL
BH CV LOWER VASCULAR LEFT MID FEMORAL COMPETENT: NORMAL
BH CV LOWER VASCULAR LEFT MID FEMORAL COMPRESS: NORMAL
BH CV LOWER VASCULAR LEFT MID FEMORAL PHASIC: NORMAL
BH CV LOWER VASCULAR LEFT MID FEMORAL SPONT: NORMAL
BH CV LOWER VASCULAR LEFT PERONEAL COMPRESS: NORMAL
BH CV LOWER VASCULAR LEFT POPLITEAL AUGMENT: NORMAL
BH CV LOWER VASCULAR LEFT POPLITEAL COMPETENT: NORMAL
BH CV LOWER VASCULAR LEFT POPLITEAL COMPRESS: NORMAL
BH CV LOWER VASCULAR LEFT POPLITEAL PHASIC: NORMAL
BH CV LOWER VASCULAR LEFT POPLITEAL SPONT: NORMAL
BH CV LOWER VASCULAR LEFT POSTERIOR TIBIAL COMPRESS: NORMAL
BH CV LOWER VASCULAR LEFT PROXIMAL FEMORAL COMPRESS: NORMAL
BH CV LOWER VASCULAR LEFT SAPHENOFEMORAL JUNCTION COMPRESS: NORMAL
BH CV LOWER VASCULAR LEFT SAPHENOFEMORAL JUNCTION PHASIC: NORMAL
BH CV LOWER VASCULAR LEFT SAPHENOFEMORAL JUNCTION SPONT: NORMAL
BH CV LOWER VASCULAR RIGHT COMMON FEMORAL AUGMENT: NORMAL
BH CV LOWER VASCULAR RIGHT COMMON FEMORAL COMPRESS: NORMAL
BH CV LOWER VASCULAR RIGHT COMMON FEMORAL PHASIC: NORMAL
BH CV LOWER VASCULAR RIGHT COMMON FEMORAL SPONT: NORMAL
BH CV LOWER VASCULAR RIGHT COMMON FEMORAL THROMBUS: NORMAL
BH CV LOWER VASCULAR RIGHT DISTAL FEMORAL COMPRESS: NORMAL
BH CV LOWER VASCULAR RIGHT GASTRONEMIUS COMPRESS: NORMAL
BH CV LOWER VASCULAR RIGHT GASTRONEMIUS THROMBUS: NORMAL
BH CV LOWER VASCULAR RIGHT GREATER SAPH AK COMPRESS: NORMAL
BH CV LOWER VASCULAR RIGHT GREATER SAPH BK COMPRESS: NORMAL
BH CV LOWER VASCULAR RIGHT MID FEMORAL AUGMENT: NORMAL
BH CV LOWER VASCULAR RIGHT MID FEMORAL COMPETENT: NORMAL
BH CV LOWER VASCULAR RIGHT MID FEMORAL COMPRESS: NORMAL
BH CV LOWER VASCULAR RIGHT MID FEMORAL PHASIC: NORMAL
BH CV LOWER VASCULAR RIGHT MID FEMORAL SPONT: NORMAL
BH CV LOWER VASCULAR RIGHT PERONEAL COMPRESS: NORMAL
BH CV LOWER VASCULAR RIGHT POPLITEAL AUGMENT: NORMAL
BH CV LOWER VASCULAR RIGHT POPLITEAL COMPETENT: NORMAL
BH CV LOWER VASCULAR RIGHT POPLITEAL COMPRESS: NORMAL
BH CV LOWER VASCULAR RIGHT POPLITEAL PHASIC: NORMAL
BH CV LOWER VASCULAR RIGHT POPLITEAL SPONT: NORMAL
BH CV LOWER VASCULAR RIGHT POSTERIOR TIBIAL COMPRESS: NORMAL
BH CV LOWER VASCULAR RIGHT PROFUNDA FEMORAL COMPRESS: NORMAL
BH CV LOWER VASCULAR RIGHT PROFUNDA FEMORAL THROMBUS: NORMAL
BH CV LOWER VASCULAR RIGHT PROXIMAL FEMORAL COMPRESS: NORMAL
BH CV LOWER VASCULAR RIGHT PROXIMAL FEMORAL THROMBUS: NORMAL
BH CV LOWER VASCULAR RIGHT SAPHENOFEMORAL JUNCTION COMPRESS: NORMAL
BH CV LOWER VASCULAR RIGHT SAPHENOFEMORAL JUNCTION PHASIC: NORMAL
BH CV LOWER VASCULAR RIGHT SAPHENOFEMORAL JUNCTION SPONT: NORMAL
BH CV LOWER VASCULAR RIGHT SAPHENOFEMORAL JUNCTION THROMBUS: NORMAL
CREAT BLD-MCNC: 1.17 MG/DL (ref 0.76–1.27)
GAMMA GLOB SERPL ELPH-MCNC: 1.2 G/DL (ref 0.4–1.8)
GFR SERPL CREATININE-BSD FRML MDRD: 62 ML/MIN/1.73
GLOBULIN SER CALC-MCNC: 3.2 G/DL (ref 2.2–3.9)
IGA SERPL-MCNC: 163 MG/DL (ref 61–437)
IGG SERPL-MCNC: 1206 MG/DL (ref 700–1600)
IGM SERPL-MCNC: 35 MG/DL (ref 20–172)
INTERPRETATION SERPL IEP-IMP: ABNORMAL
KAPPA LC SERPL-MCNC: 40.2 MG/L (ref 3.3–19.4)
KAPPA LC/LAMBDA SER: 1.04 {RATIO} (ref 0.26–1.65)
LAMBDA LC FREE SERPL-MCNC: 38.6 MG/L (ref 5.7–26.3)
Lab: ABNORMAL
M-SPIKE: ABNORMAL G/DL
PROT SERPL-MCNC: 6 G/DL (ref 6–8.5)

## 2018-10-26 PROCEDURE — 25010000002 ENOXAPARIN PER 10 MG: Performed by: INTERNAL MEDICINE

## 2018-10-26 PROCEDURE — 99232 SBSQ HOSP IP/OBS MODERATE 35: CPT | Performed by: INTERNAL MEDICINE

## 2018-10-26 PROCEDURE — 93970 EXTREMITY STUDY: CPT

## 2018-10-26 PROCEDURE — 97110 THERAPEUTIC EXERCISES: CPT

## 2018-10-26 PROCEDURE — 82565 ASSAY OF CREATININE: CPT | Performed by: INTERNAL MEDICINE

## 2018-10-26 PROCEDURE — 99233 SBSQ HOSP IP/OBS HIGH 50: CPT | Performed by: INTERNAL MEDICINE

## 2018-10-26 PROCEDURE — 99232 SBSQ HOSP IP/OBS MODERATE 35: CPT | Performed by: NURSE PRACTITIONER

## 2018-10-26 RX ADMIN — LEVOTHYROXINE SODIUM 88 MCG: 88 TABLET ORAL at 06:39

## 2018-10-26 RX ADMIN — ENOXAPARIN SODIUM 110 MG: 120 INJECTION SUBCUTANEOUS at 12:53

## 2018-10-26 RX ADMIN — VITAMIN D, TAB 1000IU (100/BT) 2000 UNITS: 25 TAB at 08:59

## 2018-10-26 RX ADMIN — LISINOPRIL 20 MG: 20 TABLET ORAL at 08:59

## 2018-10-26 RX ADMIN — VANCOMYCIN 125 MG: KIT at 06:39

## 2018-10-26 RX ADMIN — Medication 3 ML: at 22:39

## 2018-10-26 RX ADMIN — PREGABALIN 300 MG: 100 CAPSULE ORAL at 22:38

## 2018-10-26 RX ADMIN — VANCOMYCIN 125 MG: KIT at 12:30

## 2018-10-26 RX ADMIN — DULOXETINE HYDROCHLORIDE 120 MG: 60 CAPSULE, DELAYED RELEASE ORAL at 22:38

## 2018-10-26 RX ADMIN — PREGABALIN 300 MG: 100 CAPSULE ORAL at 08:59

## 2018-10-26 RX ADMIN — HYDROCODONE BITARTRATE AND ACETAMINOPHEN 1 TABLET: 7.5; 325 TABLET ORAL at 22:40

## 2018-10-26 RX ADMIN — PHENYTOIN SODIUM 500 MG: 100 CAPSULE, EXTENDED RELEASE ORAL at 22:38

## 2018-10-26 RX ADMIN — FINASTERIDE 5 MG: 5 TABLET, FILM COATED ORAL at 08:59

## 2018-10-26 RX ADMIN — VANCOMYCIN 125 MG: KIT at 17:47

## 2018-10-27 PROBLEM — N17.9 AKI (ACUTE KIDNEY INJURY) (HCC): Status: RESOLVED | Noted: 2018-10-22 | Resolved: 2018-10-27

## 2018-10-27 PROBLEM — E86.0 DEHYDRATION: Status: RESOLVED | Noted: 2018-10-22 | Resolved: 2018-10-27

## 2018-10-27 PROBLEM — E87.6 HYPOPOTASSEMIA: Status: RESOLVED | Noted: 2018-10-25 | Resolved: 2018-10-27

## 2018-10-27 LAB
CREAT BLD-MCNC: 1.17 MG/DL (ref 0.76–1.27)
DEPRECATED RDW RBC AUTO: 46.2 FL (ref 37–54)
ERYTHROCYTE [DISTWIDTH] IN BLOOD BY AUTOMATED COUNT: 13.3 % (ref 11.5–14.5)
GFR SERPL CREATININE-BSD FRML MDRD: 62 ML/MIN/1.73
HCT VFR BLD AUTO: 34.9 % (ref 40.4–52.2)
HGB BLD-MCNC: 10.8 G/DL (ref 13.7–17.6)
MCH RBC QN AUTO: 29.6 PG (ref 27–32.7)
MCHC RBC AUTO-ENTMCNC: 30.9 G/DL (ref 32.6–36.4)
MCV RBC AUTO: 95.6 FL (ref 79.8–96.2)
PLATELET # BLD AUTO: 192 10*3/MM3 (ref 140–500)
PMV BLD AUTO: 10.2 FL (ref 6–12)
RBC # BLD AUTO: 3.65 10*6/MM3 (ref 4.6–6)
WBC NRBC COR # BLD: 7.84 10*3/MM3 (ref 4.5–10.7)

## 2018-10-27 PROCEDURE — 85027 COMPLETE CBC AUTOMATED: CPT | Performed by: INTERNAL MEDICINE

## 2018-10-27 PROCEDURE — 82565 ASSAY OF CREATININE: CPT | Performed by: INTERNAL MEDICINE

## 2018-10-27 PROCEDURE — 99232 SBSQ HOSP IP/OBS MODERATE 35: CPT | Performed by: INTERNAL MEDICINE

## 2018-10-27 PROCEDURE — 25010000002 ENOXAPARIN PER 10 MG: Performed by: INTERNAL MEDICINE

## 2018-10-27 RX ADMIN — PHENYTOIN SODIUM 500 MG: 100 CAPSULE, EXTENDED RELEASE ORAL at 21:57

## 2018-10-27 RX ADMIN — VANCOMYCIN 125 MG: KIT at 13:39

## 2018-10-27 RX ADMIN — ENOXAPARIN SODIUM 110 MG: 120 INJECTION SUBCUTANEOUS at 10:23

## 2018-10-27 RX ADMIN — Medication 3 ML: at 09:09

## 2018-10-27 RX ADMIN — PREGABALIN 300 MG: 100 CAPSULE ORAL at 21:57

## 2018-10-27 RX ADMIN — PREGABALIN 300 MG: 100 CAPSULE ORAL at 09:09

## 2018-10-27 RX ADMIN — VANCOMYCIN 125 MG: KIT at 00:56

## 2018-10-27 RX ADMIN — VANCOMYCIN 125 MG: KIT at 06:45

## 2018-10-27 RX ADMIN — DULOXETINE HYDROCHLORIDE 120 MG: 60 CAPSULE, DELAYED RELEASE ORAL at 21:57

## 2018-10-27 RX ADMIN — VITAMIN D, TAB 1000IU (100/BT) 2000 UNITS: 25 TAB at 09:08

## 2018-10-27 RX ADMIN — LISINOPRIL 20 MG: 20 TABLET ORAL at 09:08

## 2018-10-27 RX ADMIN — VANCOMYCIN 125 MG: KIT at 18:01

## 2018-10-27 RX ADMIN — LEVOTHYROXINE SODIUM 88 MCG: 88 TABLET ORAL at 06:45

## 2018-10-27 RX ADMIN — ENOXAPARIN SODIUM 110 MG: 120 INJECTION SUBCUTANEOUS at 00:55

## 2018-10-27 RX ADMIN — ENOXAPARIN SODIUM 110 MG: 120 INJECTION SUBCUTANEOUS at 21:57

## 2018-10-27 RX ADMIN — FINASTERIDE 5 MG: 5 TABLET, FILM COATED ORAL at 09:08

## 2018-10-28 LAB
ANION GAP SERPL CALCULATED.3IONS-SCNC: 8.6 MMOL/L
BASOPHILS # BLD AUTO: 0.04 10*3/MM3 (ref 0–0.2)
BASOPHILS NFR BLD AUTO: 0.5 % (ref 0–1.5)
BUN BLD-MCNC: 17 MG/DL (ref 8–23)
BUN/CREAT SERPL: 11.1 (ref 7–25)
CALCIUM SPEC-SCNC: 9.2 MG/DL (ref 8.6–10.5)
CHLORIDE SERPL-SCNC: 105 MMOL/L (ref 98–107)
CO2 SERPL-SCNC: 28.4 MMOL/L (ref 22–29)
CREAT BLD-MCNC: 1.53 MG/DL (ref 0.76–1.27)
DEPRECATED RDW RBC AUTO: 48.1 FL (ref 37–54)
EOSINOPHIL # BLD AUTO: 1.12 10*3/MM3 (ref 0–0.7)
EOSINOPHIL NFR BLD AUTO: 13.9 % (ref 0.3–6.2)
ERYTHROCYTE [DISTWIDTH] IN BLOOD BY AUTOMATED COUNT: 13.7 % (ref 11.5–14.5)
GFR SERPL CREATININE-BSD FRML MDRD: 45 ML/MIN/1.73
GLUCOSE BLD-MCNC: 104 MG/DL (ref 65–99)
HCT VFR BLD AUTO: 35.9 % (ref 40.4–52.2)
HGB BLD-MCNC: 11 G/DL (ref 13.7–17.6)
IMM GRANULOCYTES # BLD: 0.06 10*3/MM3 (ref 0–0.03)
IMM GRANULOCYTES NFR BLD: 0.7 % (ref 0–0.5)
LYMPHOCYTES # BLD AUTO: 2.57 10*3/MM3 (ref 0.9–4.8)
LYMPHOCYTES NFR BLD AUTO: 31.9 % (ref 19.6–45.3)
MCH RBC QN AUTO: 29.6 PG (ref 27–32.7)
MCHC RBC AUTO-ENTMCNC: 30.6 G/DL (ref 32.6–36.4)
MCV RBC AUTO: 96.5 FL (ref 79.8–96.2)
MONOCYTES # BLD AUTO: 0.49 10*3/MM3 (ref 0.2–1.2)
MONOCYTES NFR BLD AUTO: 6.1 % (ref 5–12)
NEUTROPHILS # BLD AUTO: 3.78 10*3/MM3 (ref 1.9–8.1)
NEUTROPHILS NFR BLD AUTO: 46.9 % (ref 42.7–76)
PLATELET # BLD AUTO: 208 10*3/MM3 (ref 140–500)
PMV BLD AUTO: 9.9 FL (ref 6–12)
POTASSIUM BLD-SCNC: 4.8 MMOL/L (ref 3.5–5.2)
RBC # BLD AUTO: 3.72 10*6/MM3 (ref 4.6–6)
SODIUM BLD-SCNC: 142 MMOL/L (ref 136–145)
WBC NRBC COR # BLD: 8.06 10*3/MM3 (ref 4.5–10.7)

## 2018-10-28 PROCEDURE — 80048 BASIC METABOLIC PNL TOTAL CA: CPT | Performed by: INTERNAL MEDICINE

## 2018-10-28 PROCEDURE — 99232 SBSQ HOSP IP/OBS MODERATE 35: CPT | Performed by: INTERNAL MEDICINE

## 2018-10-28 PROCEDURE — 83497 ASSAY OF 5-HIAA: CPT | Performed by: INTERNAL MEDICINE

## 2018-10-28 PROCEDURE — 81050 URINALYSIS VOLUME MEASURE: CPT | Performed by: INTERNAL MEDICINE

## 2018-10-28 PROCEDURE — 97110 THERAPEUTIC EXERCISES: CPT | Performed by: PHYSICAL THERAPIST

## 2018-10-28 PROCEDURE — 85025 COMPLETE CBC W/AUTO DIFF WBC: CPT | Performed by: INTERNAL MEDICINE

## 2018-10-28 PROCEDURE — 25010000002 ENOXAPARIN PER 10 MG: Performed by: INTERNAL MEDICINE

## 2018-10-28 RX ORDER — SODIUM CHLORIDE, SODIUM LACTATE, POTASSIUM CHLORIDE, CALCIUM CHLORIDE 600; 310; 30; 20 MG/100ML; MG/100ML; MG/100ML; MG/100ML
75 INJECTION, SOLUTION INTRAVENOUS CONTINUOUS
Status: DISCONTINUED | OUTPATIENT
Start: 2018-10-28 | End: 2018-10-29

## 2018-10-28 RX ORDER — CHOLESTYRAMINE 4 G/9G
1 POWDER, FOR SUSPENSION ORAL EVERY 12 HOURS SCHEDULED
Status: COMPLETED | OUTPATIENT
Start: 2018-10-28 | End: 2018-10-30

## 2018-10-28 RX ADMIN — VANCOMYCIN 125 MG: KIT at 18:30

## 2018-10-28 RX ADMIN — VANCOMYCIN 125 MG: KIT at 12:18

## 2018-10-28 RX ADMIN — LISINOPRIL 20 MG: 20 TABLET ORAL at 09:38

## 2018-10-28 RX ADMIN — SODIUM CHLORIDE, POTASSIUM CHLORIDE, SODIUM LACTATE AND CALCIUM CHLORIDE 75 ML/HR: 600; 310; 30; 20 INJECTION, SOLUTION INTRAVENOUS at 18:30

## 2018-10-28 RX ADMIN — FINASTERIDE 5 MG: 5 TABLET, FILM COATED ORAL at 09:38

## 2018-10-28 RX ADMIN — ENOXAPARIN SODIUM 110 MG: 120 INJECTION SUBCUTANEOUS at 22:27

## 2018-10-28 RX ADMIN — VITAMIN D, TAB 1000IU (100/BT) 2000 UNITS: 25 TAB at 09:38

## 2018-10-28 RX ADMIN — PREGABALIN 300 MG: 100 CAPSULE ORAL at 22:26

## 2018-10-28 RX ADMIN — VANCOMYCIN 125 MG: KIT at 01:49

## 2018-10-28 RX ADMIN — LEVOTHYROXINE SODIUM 88 MCG: 88 TABLET ORAL at 06:58

## 2018-10-28 RX ADMIN — VANCOMYCIN 125 MG: KIT at 23:46

## 2018-10-28 RX ADMIN — DULOXETINE HYDROCHLORIDE 120 MG: 60 CAPSULE, DELAYED RELEASE ORAL at 22:27

## 2018-10-28 RX ADMIN — ENOXAPARIN SODIUM 110 MG: 120 INJECTION SUBCUTANEOUS at 09:38

## 2018-10-28 RX ADMIN — VANCOMYCIN 125 MG: KIT at 06:58

## 2018-10-28 RX ADMIN — Medication 3 ML: at 09:53

## 2018-10-28 RX ADMIN — PHENYTOIN SODIUM 500 MG: 100 CAPSULE, EXTENDED RELEASE ORAL at 22:27

## 2018-10-28 RX ADMIN — PREGABALIN 300 MG: 100 CAPSULE ORAL at 09:53

## 2018-10-29 LAB
ANION GAP SERPL CALCULATED.3IONS-SCNC: 13.2 MMOL/L
BUN BLD-MCNC: 17 MG/DL (ref 8–23)
BUN/CREAT SERPL: 15.6 (ref 7–25)
CALCIUM SPEC-SCNC: 8.9 MG/DL (ref 8.6–10.5)
CAMPYLOBACTER STL CULT: NORMAL
CGA SERPL-SCNC: 8 NMOL/L (ref 0–5)
CHLORIDE SERPL-SCNC: 103 MMOL/L (ref 98–107)
CO2 SERPL-SCNC: 25.8 MMOL/L (ref 22–29)
CREAT BLD-MCNC: 1.09 MG/DL (ref 0.76–1.27)
DEPRECATED RDW RBC AUTO: 49.2 FL (ref 37–54)
E COLI SXT STL QL IA: NEGATIVE
ERYTHROCYTE [DISTWIDTH] IN BLOOD BY AUTOMATED COUNT: 14 % (ref 11.5–14.5)
GFR SERPL CREATININE-BSD FRML MDRD: 67 ML/MIN/1.73
GLUCOSE BLD-MCNC: 104 MG/DL (ref 65–99)
HCT VFR BLD AUTO: 37.2 % (ref 40.4–52.2)
HGB BLD-MCNC: 11.4 G/DL (ref 13.7–17.6)
Lab: NORMAL
Lab: NORMAL
MCH RBC QN AUTO: 29.8 PG (ref 27–32.7)
MCHC RBC AUTO-ENTMCNC: 30.6 G/DL (ref 32.6–36.4)
MCV RBC AUTO: 97.1 FL (ref 79.8–96.2)
PLATELET # BLD AUTO: 213 10*3/MM3 (ref 140–500)
PMV BLD AUTO: 9.9 FL (ref 6–12)
POTASSIUM BLD-SCNC: 4.2 MMOL/L (ref 3.5–5.2)
RBC # BLD AUTO: 3.83 10*6/MM3 (ref 4.6–6)
SALM + SHIG STL CULT: NORMAL
SODIUM BLD-SCNC: 142 MMOL/L (ref 136–145)
WBC NRBC COR # BLD: 8.79 10*3/MM3 (ref 4.5–10.7)

## 2018-10-29 PROCEDURE — 99232 SBSQ HOSP IP/OBS MODERATE 35: CPT | Performed by: PSYCHIATRY & NEUROLOGY

## 2018-10-29 PROCEDURE — 85027 COMPLETE CBC AUTOMATED: CPT | Performed by: INTERNAL MEDICINE

## 2018-10-29 PROCEDURE — 25010000002 ENOXAPARIN PER 10 MG: Performed by: INTERNAL MEDICINE

## 2018-10-29 PROCEDURE — 99233 SBSQ HOSP IP/OBS HIGH 50: CPT | Performed by: INTERNAL MEDICINE

## 2018-10-29 PROCEDURE — 80048 BASIC METABOLIC PNL TOTAL CA: CPT | Performed by: HOSPITALIST

## 2018-10-29 RX ADMIN — ENOXAPARIN SODIUM 110 MG: 120 INJECTION SUBCUTANEOUS at 10:25

## 2018-10-29 RX ADMIN — VANCOMYCIN 125 MG: KIT at 07:00

## 2018-10-29 RX ADMIN — VANCOMYCIN 125 MG: KIT at 18:48

## 2018-10-29 RX ADMIN — DULOXETINE HYDROCHLORIDE 120 MG: 60 CAPSULE, DELAYED RELEASE ORAL at 21:51

## 2018-10-29 RX ADMIN — CHOLESTYRAMINE 1 PACKET: 4 POWDER, FOR SUSPENSION ORAL at 21:50

## 2018-10-29 RX ADMIN — CHOLESTYRAMINE 1 PACKET: 4 POWDER, FOR SUSPENSION ORAL at 02:06

## 2018-10-29 RX ADMIN — CHOLESTYRAMINE 1 PACKET: 4 POWDER, FOR SUSPENSION ORAL at 10:25

## 2018-10-29 RX ADMIN — Medication 3 ML: at 21:51

## 2018-10-29 RX ADMIN — Medication 3 ML: at 08:50

## 2018-10-29 RX ADMIN — PHENYTOIN SODIUM 500 MG: 100 CAPSULE, EXTENDED RELEASE ORAL at 21:50

## 2018-10-29 RX ADMIN — ENOXAPARIN SODIUM 110 MG: 120 INJECTION SUBCUTANEOUS at 21:51

## 2018-10-29 RX ADMIN — PREGABALIN 300 MG: 100 CAPSULE ORAL at 08:48

## 2018-10-29 RX ADMIN — VANCOMYCIN 125 MG: KIT at 12:59

## 2018-10-29 RX ADMIN — VITAMIN D, TAB 1000IU (100/BT) 2000 UNITS: 25 TAB at 08:47

## 2018-10-29 RX ADMIN — FINASTERIDE 5 MG: 5 TABLET, FILM COATED ORAL at 08:47

## 2018-10-29 RX ADMIN — HYDROCODONE BITARTRATE AND ACETAMINOPHEN 1 TABLET: 7.5; 325 TABLET ORAL at 08:48

## 2018-10-29 RX ADMIN — LEVOTHYROXINE SODIUM 88 MCG: 88 TABLET ORAL at 07:00

## 2018-10-29 RX ADMIN — PREGABALIN 300 MG: 100 CAPSULE ORAL at 21:50

## 2018-10-30 VITALS
HEART RATE: 80 BPM | BODY MASS INDEX: 37.76 KG/M2 | TEMPERATURE: 97.8 F | WEIGHT: 249.12 LBS | RESPIRATION RATE: 18 BRPM | OXYGEN SATURATION: 94 % | SYSTOLIC BLOOD PRESSURE: 140 MMHG | DIASTOLIC BLOOD PRESSURE: 70 MMHG | HEIGHT: 68 IN

## 2018-10-30 PROBLEM — I82.4Z1 ACUTE DEEP VEIN THROMBOSIS (DVT) OF DISTAL END OF RIGHT LOWER EXTREMITY (HCC): Status: ACTIVE | Noted: 2018-10-30

## 2018-10-30 PROBLEM — N17.9 AKI (ACUTE KIDNEY INJURY) (HCC): Status: RESOLVED | Noted: 2018-10-22 | Resolved: 2018-10-30

## 2018-10-30 PROCEDURE — 25010000002 ENOXAPARIN PER 10 MG: Performed by: INTERNAL MEDICINE

## 2018-10-30 RX ORDER — ACETAMINOPHEN 325 MG/1
650 TABLET ORAL EVERY 6 HOURS PRN
Start: 2018-10-30

## 2018-10-30 RX ORDER — PREGABALIN 150 MG/1
300 CAPSULE ORAL 2 TIMES DAILY
Qty: 30 CAPSULE | Refills: 0 | Status: ON HOLD | OUTPATIENT
Start: 2018-10-30 | End: 2019-01-01 | Stop reason: SDUPTHER

## 2018-10-30 RX ADMIN — ENOXAPARIN SODIUM 110 MG: 120 INJECTION SUBCUTANEOUS at 08:35

## 2018-10-30 RX ADMIN — VANCOMYCIN 125 MG: KIT at 05:51

## 2018-10-30 RX ADMIN — Medication 3 ML: at 09:17

## 2018-10-30 RX ADMIN — PREGABALIN 300 MG: 100 CAPSULE ORAL at 08:36

## 2018-10-30 RX ADMIN — VANCOMYCIN 125 MG: KIT at 01:27

## 2018-10-30 RX ADMIN — FINASTERIDE 5 MG: 5 TABLET, FILM COATED ORAL at 08:36

## 2018-10-30 RX ADMIN — VITAMIN D, TAB 1000IU (100/BT) 2000 UNITS: 25 TAB at 08:36

## 2018-10-30 RX ADMIN — LEVOTHYROXINE SODIUM 88 MCG: 88 TABLET ORAL at 08:36

## 2018-10-30 RX ADMIN — CHOLESTYRAMINE 1 PACKET: 4 POWDER, FOR SUSPENSION ORAL at 08:36

## 2018-10-30 RX ADMIN — VANCOMYCIN 125 MG: KIT at 11:14

## 2018-11-01 LAB
5OH-INDOLEACETATE 24H UR-MCNC: 2.1 MG/L
5OH-INDOLEACETATE 24H UR-MRATE: 5 MG/24 HR (ref 0–14.9)

## 2018-11-13 ENCOUNTER — APPOINTMENT (OUTPATIENT)
Dept: LAB | Facility: HOSPITAL | Age: 68
End: 2018-11-13

## 2018-11-13 ENCOUNTER — APPOINTMENT (OUTPATIENT)
Dept: ONCOLOGY | Facility: CLINIC | Age: 68
End: 2018-11-13

## 2018-11-26 ENCOUNTER — TELEPHONE (OUTPATIENT)
Dept: ONCOLOGY | Facility: HOSPITAL | Age: 68
End: 2018-11-26

## 2018-11-26 NOTE — TELEPHONE ENCOUNTER
----- Message from Maria De Jesus Villafana sent at 11/26/2018  3:14 PM EST -----  Contact: [phone]  [facility] rehab  Calling about pt coming off of Lovenox and transitioning to another med.  Per Dr Steele notes to stay on Lovenox as outpatient. [facility] verbalized understanding.

## 2018-12-03 ENCOUNTER — TELEPHONE (OUTPATIENT)
Dept: NEUROLOGY | Facility: CLINIC | Age: 68
End: 2018-12-03

## 2018-12-04 ENCOUNTER — TELEPHONE (OUTPATIENT)
Dept: NEUROLOGY | Facility: CLINIC | Age: 68
End: 2018-12-04

## 2018-12-04 NOTE — TELEPHONE ENCOUNTER
I returned a call to this patient who is in a rehabilitation at [place] after DVT with pulmonary embolism.      He is currently on Lovenox.        He is a long term epilepsy patient who has taken brand-name Dilantin virtually his entire life.  Attempts to switch to other medicines have met with failure.  Attempts to switch to generic Dilantin have met with failure.    Thus he will need to remain on brand-name Dilantin.       If he needs to be on anticoagulant I reviewed Xarelto Eliquis and Coumadin.      1.)   Xarelto and Eliquis:  Although these medicines do not require blood tests,  that is a less attractive feature for patients taking Dilantin because we don't know the level of anticoagulation , and Dilantin can affect the anticoagulation with these drugs      2.)  Coumadin   CAN   be used because the Dilantin dose is stable from one day to the next.     [ It's those cases where the Dilantin dose is being altered that the pro-time and the INR blood tests can fluctuate, due to protein binding competition between dilantin and coumadin.]    I told him it would be okay for rehab to switch him from Lovenox to warfarin,  and manage the INR  in appropriate fashion        Thus Coumadin is a better choice because the level of anticoagulation can be more accurately measured

## 2018-12-04 NOTE — TELEPHONE ENCOUNTER
Dilantin is a difficult drug to work with.    Aspirin goes okay with Dilantin    However Plavix can raise the Dilantin level.  So Plavix can be used safely but the blood level of Dilantin needs to be monitored.    Coumadin has problems with Dilantin and can cause more bleeding.      Dilantin can be taken with Eliquis or Xarelto however the blood thinning may be weekend by the Dilantin.  The doctor who prescribed these medicines needs to know that can adjust the dose of the blood thinner accordingly    Summary: Dilantin can be a difficult drug to work with but if the prescribing doctor who is giving the blood thinner knows about the interactions then they can make adjustments accordingly

## 2018-12-07 NOTE — TELEPHONE ENCOUNTER
I called and spoke to Mr. Stone about this and he is going to discuss it with the other Doctor. He said the Doctor told him he would need to remain on this drug unless he stops taking the Dilantin.     I then transferred the phone to Dr. Yo

## 2019-01-01 ENCOUNTER — HOSPITAL ENCOUNTER (INPATIENT)
Facility: HOSPITAL | Age: 69
LOS: 11 days | Discharge: NURSING, REHAB OR LONG-TERM CARE | End: 2019-04-17
Attending: EMERGENCY MEDICINE | Admitting: INTERNAL MEDICINE

## 2019-01-01 ENCOUNTER — APPOINTMENT (OUTPATIENT)
Dept: CT IMAGING | Facility: HOSPITAL | Age: 69
End: 2019-01-01

## 2019-01-01 ENCOUNTER — TELEPHONE (OUTPATIENT)
Dept: NEUROLOGY | Facility: CLINIC | Age: 69
End: 2019-01-01

## 2019-01-01 ENCOUNTER — APPOINTMENT (OUTPATIENT)
Dept: NUCLEAR MEDICINE | Facility: HOSPITAL | Age: 69
End: 2019-01-01

## 2019-01-01 ENCOUNTER — HOSPITAL ENCOUNTER (EMERGENCY)
Facility: HOSPITAL | Age: 69
Discharge: HOME | End: 2019-12-28
Attending: EMERGENCY MEDICINE | Admitting: EMERGENCY MEDICINE

## 2019-01-01 ENCOUNTER — LAB REQUISITION (OUTPATIENT)
Dept: LAB | Facility: HOSPITAL | Age: 69
End: 2019-01-01

## 2019-01-01 ENCOUNTER — ANESTHESIA (OUTPATIENT)
Dept: GASTROENTEROLOGY | Facility: HOSPITAL | Age: 69
End: 2019-01-01

## 2019-01-01 ENCOUNTER — HOSPITAL ENCOUNTER (INPATIENT)
Facility: HOSPITAL | Age: 69
LOS: 17 days | Discharge: NURSING, REHAB OR LONG-TERM CARE | End: 2019-09-25
Attending: EMERGENCY MEDICINE | Admitting: HOSPITALIST

## 2019-01-01 ENCOUNTER — APPOINTMENT (OUTPATIENT)
Dept: CARDIOLOGY | Facility: HOSPITAL | Age: 69
End: 2019-01-01

## 2019-01-01 ENCOUNTER — APPOINTMENT (OUTPATIENT)
Dept: GENERAL RADIOLOGY | Facility: HOSPITAL | Age: 69
End: 2019-01-01

## 2019-01-01 ENCOUNTER — HOSPITAL ENCOUNTER (OUTPATIENT)
Facility: HOSPITAL | Age: 69
Setting detail: HOSPITAL OUTPATIENT SURGERY
End: 2019-01-01
Attending: INTERNAL MEDICINE | Admitting: INTERNAL MEDICINE

## 2019-01-01 ENCOUNTER — ANESTHESIA EVENT (OUTPATIENT)
Dept: GASTROENTEROLOGY | Facility: HOSPITAL | Age: 69
End: 2019-01-01

## 2019-01-01 ENCOUNTER — DOCUMENTATION (OUTPATIENT)
Dept: SURGERY | Facility: HOSPITAL | Age: 69
End: 2019-01-01

## 2019-01-01 ENCOUNTER — APPOINTMENT (OUTPATIENT)
Dept: ULTRASOUND IMAGING | Facility: HOSPITAL | Age: 69
End: 2019-01-01

## 2019-01-01 ENCOUNTER — HOSPITAL ENCOUNTER (EMERGENCY)
Facility: HOSPITAL | Age: 69
Discharge: HOME | End: 2019-06-26
Attending: EMERGENCY MEDICINE | Admitting: EMERGENCY MEDICINE

## 2019-01-01 VITALS
OXYGEN SATURATION: 95 % | BODY MASS INDEX: 38.65 KG/M2 | SYSTOLIC BLOOD PRESSURE: 129 MMHG | HEART RATE: 84 BPM | WEIGHT: 255 LBS | DIASTOLIC BLOOD PRESSURE: 76 MMHG | RESPIRATION RATE: 18 BRPM | TEMPERATURE: 98.3 F | HEIGHT: 68 IN

## 2019-01-01 VITALS
RESPIRATION RATE: 16 BRPM | DIASTOLIC BLOOD PRESSURE: 102 MMHG | TEMPERATURE: 98.6 F | OXYGEN SATURATION: 100 % | SYSTOLIC BLOOD PRESSURE: 159 MMHG | HEART RATE: 100 BPM

## 2019-01-01 VITALS
HEIGHT: 68 IN | WEIGHT: 180.3 LBS | RESPIRATION RATE: 18 BRPM | DIASTOLIC BLOOD PRESSURE: 61 MMHG | SYSTOLIC BLOOD PRESSURE: 97 MMHG | BODY MASS INDEX: 27.32 KG/M2 | HEART RATE: 80 BPM | TEMPERATURE: 97.7 F | OXYGEN SATURATION: 95 %

## 2019-01-01 VITALS
HEART RATE: 75 BPM | DIASTOLIC BLOOD PRESSURE: 76 MMHG | HEIGHT: 68 IN | BODY MASS INDEX: 27.41 KG/M2 | OXYGEN SATURATION: 93 % | SYSTOLIC BLOOD PRESSURE: 115 MMHG | RESPIRATION RATE: 18 BRPM | TEMPERATURE: 98 F

## 2019-01-01 DIAGNOSIS — G89.29 CHRONIC LOW BACK PAIN, UNSPECIFIED BACK PAIN LATERALITY, UNSPECIFIED WHETHER SCIATICA PRESENT: ICD-10-CM

## 2019-01-01 DIAGNOSIS — Z74.09 IMPAIRED MOBILITY AND ADLS: ICD-10-CM

## 2019-01-01 DIAGNOSIS — M54.50 CHRONIC LOW BACK PAIN, UNSPECIFIED BACK PAIN LATERALITY, UNSPECIFIED WHETHER SCIATICA PRESENT: ICD-10-CM

## 2019-01-01 DIAGNOSIS — M54.50 CHRONIC BILATERAL LOW BACK PAIN WITHOUT SCIATICA: Primary | ICD-10-CM

## 2019-01-01 DIAGNOSIS — E86.0 DEHYDRATION: ICD-10-CM

## 2019-01-01 DIAGNOSIS — Z78.9 IMPAIRED MOBILITY AND ADLS: ICD-10-CM

## 2019-01-01 DIAGNOSIS — Z74.09 IMPAIRED MOBILITY: ICD-10-CM

## 2019-01-01 DIAGNOSIS — R44.3 HALLUCINATIONS: Primary | ICD-10-CM

## 2019-01-01 DIAGNOSIS — M79.605 LEG PAIN, BILATERAL: ICD-10-CM

## 2019-01-01 DIAGNOSIS — R53.1 GENERALIZED WEAKNESS: Primary | ICD-10-CM

## 2019-01-01 DIAGNOSIS — N39.0 ACUTE UTI: Primary | ICD-10-CM

## 2019-01-01 DIAGNOSIS — R62.7 FAILURE TO THRIVE IN ADULT: ICD-10-CM

## 2019-01-01 DIAGNOSIS — R19.5 HEME POSITIVE STOOL: ICD-10-CM

## 2019-01-01 DIAGNOSIS — R53.1 GENERAL WEAKNESS: ICD-10-CM

## 2019-01-01 DIAGNOSIS — G89.29 CHRONIC BILATERAL LOW BACK PAIN WITHOUT SCIATICA: Primary | ICD-10-CM

## 2019-01-01 DIAGNOSIS — C61 MALIGNANT NEOPLASM OF PROSTATE (HCC): ICD-10-CM

## 2019-01-01 DIAGNOSIS — Z00.00 ROUTINE GENERAL MEDICAL EXAMINATION AT A HEALTH CARE FACILITY: ICD-10-CM

## 2019-01-01 DIAGNOSIS — I47.29 NONSUSTAINED VENTRICULAR TACHYCARDIA (HCC): ICD-10-CM

## 2019-01-01 DIAGNOSIS — M79.604 LEG PAIN, BILATERAL: ICD-10-CM

## 2019-01-01 LAB
ABO + RH BLD: NORMAL
ABO GROUP BLD: NORMAL
ALBUMIN SERPL-MCNC: 2.6 G/DL (ref 3.5–5.2)
ALBUMIN SERPL-MCNC: 2.8 G/DL (ref 3.5–5.2)
ALBUMIN SERPL-MCNC: 3.1 G/DL (ref 3.5–5.2)
ALBUMIN SERPL-MCNC: 3.2 G/DL (ref 3.5–5.2)
ALBUMIN SERPL-MCNC: 3.6 G/DL (ref 3.5–5.2)
ALBUMIN SERPL-MCNC: 3.9 G/DL (ref 3.5–5.2)
ALBUMIN/GLOB SERPL: 0.8 G/DL
ALBUMIN/GLOB SERPL: 0.8 G/DL
ALBUMIN/GLOB SERPL: 1 G/DL
ALBUMIN/GLOB SERPL: 1 G/DL
ALBUMIN/GLOB SERPL: 1.1 G/DL
ALBUMIN/GLOB SERPL: 1.1 G/DL
ALBUMIN/GLOB SERPL: 1.2 G/DL
ALBUMIN/GLOB SERPL: 1.2 G/DL
ALP SERPL-CCNC: 100 U/L (ref 39–117)
ALP SERPL-CCNC: 103 U/L (ref 39–117)
ALP SERPL-CCNC: 104 U/L (ref 39–117)
ALP SERPL-CCNC: 60 U/L (ref 39–117)
ALP SERPL-CCNC: 61 U/L (ref 39–117)
ALP SERPL-CCNC: 66 U/L (ref 39–117)
ALP SERPL-CCNC: 81 U/L (ref 39–117)
ALP SERPL-CCNC: 84 U/L (ref 39–117)
ALT SERPL W P-5'-P-CCNC: 14 U/L (ref 1–41)
ALT SERPL W P-5'-P-CCNC: 15 U/L (ref 1–41)
ALT SERPL W P-5'-P-CCNC: 20 U/L (ref 1–41)
ALT SERPL W P-5'-P-CCNC: 25 U/L (ref 1–41)
ALT SERPL W P-5'-P-CCNC: 26 U/L (ref 1–41)
ALT SERPL W P-5'-P-CCNC: 32 U/L (ref 1–41)
ALT SERPL W P-5'-P-CCNC: 35 U/L (ref 1–41)
ALT SERPL W P-5'-P-CCNC: 44 U/L (ref 1–41)
AMPHET+METHAMPHET UR QL: NEGATIVE
ANA SER QL: NEGATIVE
ANION GAP SERPL CALCULATED.3IONS-SCNC: 10.3 MMOL/L
ANION GAP SERPL CALCULATED.3IONS-SCNC: 10.3 MMOL/L (ref 5–15)
ANION GAP SERPL CALCULATED.3IONS-SCNC: 11.9 MMOL/L
ANION GAP SERPL CALCULATED.3IONS-SCNC: 12 MMOL/L
ANION GAP SERPL CALCULATED.3IONS-SCNC: 12.3 MMOL/L (ref 5–15)
ANION GAP SERPL CALCULATED.3IONS-SCNC: 12.5 MMOL/L
ANION GAP SERPL CALCULATED.3IONS-SCNC: 12.6 MMOL/L
ANION GAP SERPL CALCULATED.3IONS-SCNC: 12.7 MMOL/L (ref 5–15)
ANION GAP SERPL CALCULATED.3IONS-SCNC: 12.7 MMOL/L (ref 5–15)
ANION GAP SERPL CALCULATED.3IONS-SCNC: 13 MMOL/L (ref 5–15)
ANION GAP SERPL CALCULATED.3IONS-SCNC: 14.7 MMOL/L
ANION GAP SERPL CALCULATED.3IONS-SCNC: 14.9 MMOL/L
ANION GAP SERPL CALCULATED.3IONS-SCNC: 15.2 MMOL/L (ref 5–15)
ANION GAP SERPL CALCULATED.3IONS-SCNC: 16.7 MMOL/L
ANION GAP SERPL CALCULATED.3IONS-SCNC: 16.7 MMOL/L
ANION GAP SERPL CALCULATED.3IONS-SCNC: 17.3 MMOL/L
ANION GAP SERPL CALCULATED.3IONS-SCNC: 17.7 MMOL/L
ANION GAP SERPL CALCULATED.3IONS-SCNC: 19.5 MMOL/L
ANION GAP SERPL CALCULATED.3IONS-SCNC: 6.9 MMOL/L (ref 5–15)
ANION GAP SERPL CALCULATED.3IONS-SCNC: 6.9 MMOL/L (ref 5–15)
ANION GAP SERPL CALCULATED.3IONS-SCNC: 9.3 MMOL/L (ref 5–15)
ANION GAP SERPL CALCULATED.3IONS-SCNC: 9.5 MMOL/L (ref 5–15)
ANION GAP SERPL CALCULATED.3IONS-SCNC: 9.8 MMOL/L (ref 5–15)
APTT PPP: 35 SECONDS (ref 22.7–35.4)
AST SERPL-CCNC: 12 U/L (ref 1–40)
AST SERPL-CCNC: 13 U/L (ref 1–40)
AST SERPL-CCNC: 19 U/L (ref 1–40)
AST SERPL-CCNC: 20 U/L (ref 1–40)
AST SERPL-CCNC: 22 U/L (ref 1–40)
AST SERPL-CCNC: 23 U/L (ref 1–40)
AST SERPL-CCNC: 31 U/L (ref 1–40)
AST SERPL-CCNC: 32 U/L (ref 1–40)
BACTERIA SPEC AEROBE CULT: ABNORMAL
BACTERIA SPEC AEROBE CULT: ABNORMAL
BACTERIA SPEC AEROBE CULT: NO GROWTH
BACTERIA SPEC AEROBE CULT: NORMAL
BACTERIA UR QL AUTO: ABNORMAL /HPF
BARBITURATES UR QL SCN: NEGATIVE
BASOPHILS # BLD AUTO: 0.03 10*3/MM3 (ref 0–0.2)
BASOPHILS # BLD AUTO: 0.04 10*3/MM3 (ref 0–0.2)
BASOPHILS # BLD AUTO: 0.05 10*3/MM3 (ref 0–0.2)
BASOPHILS # BLD AUTO: 0.06 10*3/MM3 (ref 0–0.2)
BASOPHILS # BLD AUTO: 0.07 10*3/MM3 (ref 0–0.2)
BASOPHILS # BLD AUTO: 0.08 10*3/MM3 (ref 0–0.2)
BASOPHILS # BLD AUTO: 0.1 10*3/MM3 (ref 0–0.2)
BASOPHILS NFR BLD AUTO: 0.4 % (ref 0–1.5)
BASOPHILS NFR BLD AUTO: 0.5 % (ref 0–1.5)
BASOPHILS NFR BLD AUTO: 0.6 % (ref 0–1.5)
BASOPHILS NFR BLD AUTO: 0.6 % (ref 0–1.5)
BASOPHILS NFR BLD AUTO: 0.7 % (ref 0–1.5)
BASOPHILS NFR BLD AUTO: 1 % (ref 0–1.5)
BASOPHILS NFR BLD AUTO: 1.1 % (ref 0–1.5)
BASOPHILS NFR BLD AUTO: 1.4 % (ref 0–1.5)
BENZODIAZ UR QL SCN: NEGATIVE
BH BB BLOOD EXPIRATION DATE: NORMAL
BH BB BLOOD TYPE BARCODE: 6200
BH BB DISPENSE STATUS: NORMAL
BH BB PRODUCT CODE: NORMAL
BH BB UNIT NUMBER: NORMAL
BH CV LOW VAS RIGHT GASTRONEMIUS VESSEL: 1
BH CV LOWER VASCULAR LEFT COMMON FEMORAL AUGMENT: NORMAL
BH CV LOWER VASCULAR LEFT COMMON FEMORAL AUGMENT: NORMAL
BH CV LOWER VASCULAR LEFT COMMON FEMORAL COMPETENT: NORMAL
BH CV LOWER VASCULAR LEFT COMMON FEMORAL COMPETENT: NORMAL
BH CV LOWER VASCULAR LEFT COMMON FEMORAL COMPRESS: NORMAL
BH CV LOWER VASCULAR LEFT COMMON FEMORAL COMPRESS: NORMAL
BH CV LOWER VASCULAR LEFT COMMON FEMORAL PHASIC: NORMAL
BH CV LOWER VASCULAR LEFT COMMON FEMORAL PHASIC: NORMAL
BH CV LOWER VASCULAR LEFT COMMON FEMORAL SPONT: NORMAL
BH CV LOWER VASCULAR LEFT COMMON FEMORAL SPONT: NORMAL
BH CV LOWER VASCULAR LEFT DISTAL FEMORAL COMPRESS: NORMAL
BH CV LOWER VASCULAR LEFT DISTAL FEMORAL COMPRESS: NORMAL
BH CV LOWER VASCULAR LEFT GASTRONEMIUS COMPRESS: NORMAL
BH CV LOWER VASCULAR LEFT GASTRONEMIUS COMPRESS: NORMAL
BH CV LOWER VASCULAR LEFT GREATER SAPH AK COMPRESS: NORMAL
BH CV LOWER VASCULAR LEFT GREATER SAPH AK COMPRESS: NORMAL
BH CV LOWER VASCULAR LEFT GREATER SAPH BK COMPRESS: NORMAL
BH CV LOWER VASCULAR LEFT GREATER SAPH BK COMPRESS: NORMAL
BH CV LOWER VASCULAR LEFT MID FEMORAL AUGMENT: NORMAL
BH CV LOWER VASCULAR LEFT MID FEMORAL AUGMENT: NORMAL
BH CV LOWER VASCULAR LEFT MID FEMORAL COMPETENT: NORMAL
BH CV LOWER VASCULAR LEFT MID FEMORAL COMPETENT: NORMAL
BH CV LOWER VASCULAR LEFT MID FEMORAL COMPRESS: NORMAL
BH CV LOWER VASCULAR LEFT MID FEMORAL COMPRESS: NORMAL
BH CV LOWER VASCULAR LEFT MID FEMORAL PHASIC: NORMAL
BH CV LOWER VASCULAR LEFT MID FEMORAL PHASIC: NORMAL
BH CV LOWER VASCULAR LEFT MID FEMORAL SPONT: NORMAL
BH CV LOWER VASCULAR LEFT MID FEMORAL SPONT: NORMAL
BH CV LOWER VASCULAR LEFT PERONEAL COMPRESS: NORMAL
BH CV LOWER VASCULAR LEFT PERONEAL COMPRESS: NORMAL
BH CV LOWER VASCULAR LEFT POPLITEAL AUGMENT: NORMAL
BH CV LOWER VASCULAR LEFT POPLITEAL AUGMENT: NORMAL
BH CV LOWER VASCULAR LEFT POPLITEAL COMPETENT: NORMAL
BH CV LOWER VASCULAR LEFT POPLITEAL COMPETENT: NORMAL
BH CV LOWER VASCULAR LEFT POPLITEAL COMPRESS: NORMAL
BH CV LOWER VASCULAR LEFT POPLITEAL COMPRESS: NORMAL
BH CV LOWER VASCULAR LEFT POPLITEAL PHASIC: NORMAL
BH CV LOWER VASCULAR LEFT POPLITEAL PHASIC: NORMAL
BH CV LOWER VASCULAR LEFT POPLITEAL SPONT: NORMAL
BH CV LOWER VASCULAR LEFT POPLITEAL SPONT: NORMAL
BH CV LOWER VASCULAR LEFT POSTERIOR TIBIAL COMPRESS: NORMAL
BH CV LOWER VASCULAR LEFT POSTERIOR TIBIAL COMPRESS: NORMAL
BH CV LOWER VASCULAR LEFT PROXIMAL FEMORAL COMPRESS: NORMAL
BH CV LOWER VASCULAR LEFT PROXIMAL FEMORAL COMPRESS: NORMAL
BH CV LOWER VASCULAR LEFT SAPHENOFEMORAL JUNCTION COMPRESS: NORMAL
BH CV LOWER VASCULAR LEFT SAPHENOFEMORAL JUNCTION COMPRESS: NORMAL
BH CV LOWER VASCULAR LEFT SAPHENOFEMORAL JUNCTION PHASIC: NORMAL
BH CV LOWER VASCULAR LEFT SAPHENOFEMORAL JUNCTION SPONT: NORMAL
BH CV LOWER VASCULAR RIGHT COMMON FEMORAL AUGMENT: NORMAL
BH CV LOWER VASCULAR RIGHT COMMON FEMORAL AUGMENT: NORMAL
BH CV LOWER VASCULAR RIGHT COMMON FEMORAL COMPETENT: NORMAL
BH CV LOWER VASCULAR RIGHT COMMON FEMORAL COMPETENT: NORMAL
BH CV LOWER VASCULAR RIGHT COMMON FEMORAL COMPRESS: NORMAL
BH CV LOWER VASCULAR RIGHT COMMON FEMORAL COMPRESS: NORMAL
BH CV LOWER VASCULAR RIGHT COMMON FEMORAL PHASIC: NORMAL
BH CV LOWER VASCULAR RIGHT COMMON FEMORAL PHASIC: NORMAL
BH CV LOWER VASCULAR RIGHT COMMON FEMORAL SPONT: NORMAL
BH CV LOWER VASCULAR RIGHT COMMON FEMORAL SPONT: NORMAL
BH CV LOWER VASCULAR RIGHT DISTAL FEMORAL COMPRESS: NORMAL
BH CV LOWER VASCULAR RIGHT DISTAL FEMORAL COMPRESS: NORMAL
BH CV LOWER VASCULAR RIGHT GASTRONEMIUS COMPRESS: NORMAL
BH CV LOWER VASCULAR RIGHT GASTRONEMIUS COMPRESS: NORMAL
BH CV LOWER VASCULAR RIGHT GASTRONEMIUS THROMBUS: NORMAL
BH CV LOWER VASCULAR RIGHT GREATER SAPH AK COMPRESS: NORMAL
BH CV LOWER VASCULAR RIGHT GREATER SAPH AK COMPRESS: NORMAL
BH CV LOWER VASCULAR RIGHT GREATER SAPH BK COMPRESS: NORMAL
BH CV LOWER VASCULAR RIGHT GREATER SAPH BK COMPRESS: NORMAL
BH CV LOWER VASCULAR RIGHT MID FEMORAL AUGMENT: NORMAL
BH CV LOWER VASCULAR RIGHT MID FEMORAL AUGMENT: NORMAL
BH CV LOWER VASCULAR RIGHT MID FEMORAL COMPETENT: NORMAL
BH CV LOWER VASCULAR RIGHT MID FEMORAL COMPETENT: NORMAL
BH CV LOWER VASCULAR RIGHT MID FEMORAL COMPRESS: NORMAL
BH CV LOWER VASCULAR RIGHT MID FEMORAL COMPRESS: NORMAL
BH CV LOWER VASCULAR RIGHT MID FEMORAL PHASIC: NORMAL
BH CV LOWER VASCULAR RIGHT MID FEMORAL PHASIC: NORMAL
BH CV LOWER VASCULAR RIGHT MID FEMORAL SPONT: NORMAL
BH CV LOWER VASCULAR RIGHT MID FEMORAL SPONT: NORMAL
BH CV LOWER VASCULAR RIGHT PERONEAL COMPRESS: NORMAL
BH CV LOWER VASCULAR RIGHT PERONEAL COMPRESS: NORMAL
BH CV LOWER VASCULAR RIGHT POPLITEAL AUGMENT: NORMAL
BH CV LOWER VASCULAR RIGHT POPLITEAL AUGMENT: NORMAL
BH CV LOWER VASCULAR RIGHT POPLITEAL COMPETENT: NORMAL
BH CV LOWER VASCULAR RIGHT POPLITEAL COMPETENT: NORMAL
BH CV LOWER VASCULAR RIGHT POPLITEAL COMPRESS: NORMAL
BH CV LOWER VASCULAR RIGHT POPLITEAL COMPRESS: NORMAL
BH CV LOWER VASCULAR RIGHT POPLITEAL PHASIC: NORMAL
BH CV LOWER VASCULAR RIGHT POPLITEAL PHASIC: NORMAL
BH CV LOWER VASCULAR RIGHT POPLITEAL SPONT: NORMAL
BH CV LOWER VASCULAR RIGHT POPLITEAL SPONT: NORMAL
BH CV LOWER VASCULAR RIGHT POSTERIOR TIBIAL COMPRESS: NORMAL
BH CV LOWER VASCULAR RIGHT POSTERIOR TIBIAL COMPRESS: NORMAL
BH CV LOWER VASCULAR RIGHT PROXIMAL FEMORAL COMPRESS: NORMAL
BH CV LOWER VASCULAR RIGHT PROXIMAL FEMORAL COMPRESS: NORMAL
BH CV LOWER VASCULAR RIGHT SAPHENOFEMORAL JUNCTION COMPRESS: NORMAL
BH CV LOWER VASCULAR RIGHT SAPHENOFEMORAL JUNCTION COMPRESS: NORMAL
BH CV LOWER VASCULAR RIGHT SAPHENOFEMORAL JUNCTION PHASIC: NORMAL
BH CV LOWER VASCULAR RIGHT SAPHENOFEMORAL JUNCTION SPONT: NORMAL
BH CV STRESS COMMENTS STAGE 1: NORMAL
BH CV STRESS DOSE REGADENOSON STAGE 1: 0.4
BH CV STRESS DURATION MIN STAGE 1: 0
BH CV STRESS DURATION SEC STAGE 1: 10
BH CV STRESS PROTOCOL 1: NORMAL
BH CV STRESS RECOVERY BP: NORMAL MMHG
BH CV STRESS RECOVERY HR: 82 BPM
BH CV STRESS STAGE 1: 1
BH CV UPPER VENOUS LEFT AXILLARY AUGMENT: NORMAL
BH CV UPPER VENOUS LEFT AXILLARY COMPETENT: NORMAL
BH CV UPPER VENOUS LEFT AXILLARY COMPRESS: NORMAL
BH CV UPPER VENOUS LEFT AXILLARY PHASIC: NORMAL
BH CV UPPER VENOUS LEFT AXILLARY SPONT: NORMAL
BH CV UPPER VENOUS LEFT BASILIC FOREARM COMPRESS: NORMAL
BH CV UPPER VENOUS LEFT BASILIC UPPER COMPRESS: NORMAL
BH CV UPPER VENOUS LEFT BRACHIAL COMPRESS: NORMAL
BH CV UPPER VENOUS LEFT CEPHALIC FOREARM COMPRESS: NORMAL
BH CV UPPER VENOUS LEFT CEPHALIC UPPER COMPRESS: NORMAL
BH CV UPPER VENOUS LEFT INTERNAL JUGULAR AUGMENT: NORMAL
BH CV UPPER VENOUS LEFT INTERNAL JUGULAR COMPETENT: NORMAL
BH CV UPPER VENOUS LEFT INTERNAL JUGULAR COMPRESS: NORMAL
BH CV UPPER VENOUS LEFT INTERNAL JUGULAR PHASIC: NORMAL
BH CV UPPER VENOUS LEFT INTERNAL JUGULAR SPONT: NORMAL
BH CV UPPER VENOUS LEFT RADIAL COMPRESS: NORMAL
BH CV UPPER VENOUS LEFT SUBCLAVIAN AUGMENT: NORMAL
BH CV UPPER VENOUS LEFT SUBCLAVIAN COMPETENT: NORMAL
BH CV UPPER VENOUS LEFT SUBCLAVIAN COMPRESS: NORMAL
BH CV UPPER VENOUS LEFT SUBCLAVIAN PHASIC: NORMAL
BH CV UPPER VENOUS LEFT SUBCLAVIAN SPONT: NORMAL
BH CV UPPER VENOUS LEFT ULNAR COMPRESS: NORMAL
BH CV UPPER VENOUS RIGHT AXILLARY AUGMENT: NORMAL
BH CV UPPER VENOUS RIGHT AXILLARY COMPETENT: NORMAL
BH CV UPPER VENOUS RIGHT AXILLARY COMPRESS: NORMAL
BH CV UPPER VENOUS RIGHT AXILLARY PHASIC: NORMAL
BH CV UPPER VENOUS RIGHT AXILLARY SPONT: NORMAL
BH CV UPPER VENOUS RIGHT BASILIC FOREARM COMPRESS: NORMAL
BH CV UPPER VENOUS RIGHT BASILIC UPPER COMPRESS: NORMAL
BH CV UPPER VENOUS RIGHT BRACHIAL COMPRESS: NORMAL
BH CV UPPER VENOUS RIGHT CEPHALIC FOREARM COMPRESS: NORMAL
BH CV UPPER VENOUS RIGHT CEPHALIC UPPER COMPRESS: NORMAL
BH CV UPPER VENOUS RIGHT INTERNAL JUGULAR AUGMENT: NORMAL
BH CV UPPER VENOUS RIGHT INTERNAL JUGULAR COMPETENT: NORMAL
BH CV UPPER VENOUS RIGHT INTERNAL JUGULAR COMPRESS: NORMAL
BH CV UPPER VENOUS RIGHT INTERNAL JUGULAR PHASIC: NORMAL
BH CV UPPER VENOUS RIGHT INTERNAL JUGULAR SPONT: NORMAL
BH CV UPPER VENOUS RIGHT RADIAL COMPRESS: NORMAL
BH CV UPPER VENOUS RIGHT SUBCLAVIAN AUGMENT: NORMAL
BH CV UPPER VENOUS RIGHT SUBCLAVIAN COMPETENT: NORMAL
BH CV UPPER VENOUS RIGHT SUBCLAVIAN COMPRESS: NORMAL
BH CV UPPER VENOUS RIGHT SUBCLAVIAN PHASIC: NORMAL
BH CV UPPER VENOUS RIGHT SUBCLAVIAN SPONT: NORMAL
BH CV UPPER VENOUS RIGHT ULNAR COMPRESS: NORMAL
BILIRUB SERPL-MCNC: 0.2 MG/DL (ref 0.2–1.2)
BILIRUB SERPL-MCNC: 0.3 MG/DL (ref 0.2–1.2)
BILIRUB SERPL-MCNC: 0.3 MG/DL (ref 0.2–1.2)
BILIRUB SERPL-MCNC: 0.4 MG/DL (ref 0.2–1.2)
BILIRUB SERPL-MCNC: 0.5 MG/DL (ref 0.2–1.2)
BILIRUB SERPL-MCNC: <0.2 MG/DL (ref 0.2–1.2)
BILIRUB UR QL STRIP: NEGATIVE
BLD GP AB SCN SERPL QL: NEGATIVE
BUN BLD-MCNC: 10 MG/DL (ref 8–23)
BUN BLD-MCNC: 12 MG/DL (ref 8–23)
BUN BLD-MCNC: 13 MG/DL (ref 8–23)
BUN BLD-MCNC: 13 MG/DL (ref 8–23)
BUN BLD-MCNC: 14 MG/DL (ref 8–23)
BUN BLD-MCNC: 17 MG/DL (ref 8–23)
BUN BLD-MCNC: 18 MG/DL (ref 8–23)
BUN BLD-MCNC: 19 MG/DL (ref 8–23)
BUN BLD-MCNC: 19 MG/DL (ref 8–23)
BUN BLD-MCNC: 20 MG/DL (ref 8–23)
BUN BLD-MCNC: 21 MG/DL (ref 8–23)
BUN BLD-MCNC: 22 MG/DL (ref 8–23)
BUN BLD-MCNC: 23 MG/DL (ref 8–23)
BUN BLD-MCNC: 24 MG/DL (ref 8–23)
BUN BLD-MCNC: 24 MG/DL (ref 8–23)
BUN BLD-MCNC: 25 MG/DL (ref 8–23)
BUN BLD-MCNC: 26 MG/DL (ref 8–23)
BUN BLD-MCNC: 27 MG/DL (ref 8–23)
BUN BLD-MCNC: 28 MG/DL (ref 8–23)
BUN BLD-MCNC: 29 MG/DL (ref 8–23)
BUN BLD-MCNC: 31 MG/DL (ref 8–23)
BUN/CREAT SERPL: 12 (ref 7–25)
BUN/CREAT SERPL: 12.8 (ref 7–25)
BUN/CREAT SERPL: 13.3 (ref 7–25)
BUN/CREAT SERPL: 17.3 (ref 7–25)
BUN/CREAT SERPL: 17.6 (ref 7–25)
BUN/CREAT SERPL: 17.8 (ref 7–25)
BUN/CREAT SERPL: 17.9 (ref 7–25)
BUN/CREAT SERPL: 18.1 (ref 7–25)
BUN/CREAT SERPL: 18.3 (ref 7–25)
BUN/CREAT SERPL: 18.6 (ref 7–25)
BUN/CREAT SERPL: 18.9 (ref 7–25)
BUN/CREAT SERPL: 19.6 (ref 7–25)
BUN/CREAT SERPL: 20.4 (ref 7–25)
BUN/CREAT SERPL: 20.5 (ref 7–25)
BUN/CREAT SERPL: 20.9 (ref 7–25)
BUN/CREAT SERPL: 21.7 (ref 7–25)
BUN/CREAT SERPL: 22.2 (ref 7–25)
BUN/CREAT SERPL: 22.6 (ref 7–25)
BUN/CREAT SERPL: 22.9 (ref 7–25)
BUN/CREAT SERPL: 23.5 (ref 7–25)
BUN/CREAT SERPL: 24.2 (ref 7–25)
BUN/CREAT SERPL: 25.3 (ref 7–25)
BUN/CREAT SERPL: 25.5 (ref 7–25)
CALCIUM SPEC-SCNC: 8.1 MG/DL (ref 8.6–10.5)
CALCIUM SPEC-SCNC: 8.1 MG/DL (ref 8.6–10.5)
CALCIUM SPEC-SCNC: 8.3 MG/DL (ref 8.6–10.5)
CALCIUM SPEC-SCNC: 8.3 MG/DL (ref 8.6–10.5)
CALCIUM SPEC-SCNC: 8.4 MG/DL (ref 8.6–10.5)
CALCIUM SPEC-SCNC: 8.5 MG/DL (ref 8.6–10.5)
CALCIUM SPEC-SCNC: 8.5 MG/DL (ref 8.6–10.5)
CALCIUM SPEC-SCNC: 8.6 MG/DL (ref 8.6–10.5)
CALCIUM SPEC-SCNC: 8.6 MG/DL (ref 8.6–10.5)
CALCIUM SPEC-SCNC: 8.8 MG/DL (ref 8.6–10.5)
CALCIUM SPEC-SCNC: 8.9 MG/DL (ref 8.6–10.5)
CALCIUM SPEC-SCNC: 8.9 MG/DL (ref 8.6–10.5)
CALCIUM SPEC-SCNC: 9 MG/DL (ref 8.6–10.5)
CALCIUM SPEC-SCNC: 9 MG/DL (ref 8.6–10.5)
CALCIUM SPEC-SCNC: 9.1 MG/DL (ref 8.6–10.5)
CALCIUM SPEC-SCNC: 9.2 MG/DL (ref 8.6–10.5)
CALCIUM SPEC-SCNC: 9.2 MG/DL (ref 8.6–10.5)
CALCIUM SPEC-SCNC: 9.4 MG/DL (ref 8.6–10.5)
CALCIUM SPEC-SCNC: 9.6 MG/DL (ref 8.6–10.5)
CALCIUM SPEC-SCNC: 9.8 MG/DL (ref 8.6–10.5)
CANNABINOIDS SERPL QL: NEGATIVE
CHLORIDE SERPL-SCNC: 100 MMOL/L (ref 98–107)
CHLORIDE SERPL-SCNC: 101 MMOL/L (ref 98–107)
CHLORIDE SERPL-SCNC: 101 MMOL/L (ref 98–107)
CHLORIDE SERPL-SCNC: 102 MMOL/L (ref 98–107)
CHLORIDE SERPL-SCNC: 103 MMOL/L (ref 98–107)
CHLORIDE SERPL-SCNC: 104 MMOL/L (ref 98–107)
CHLORIDE SERPL-SCNC: 105 MMOL/L (ref 98–107)
CHLORIDE SERPL-SCNC: 105 MMOL/L (ref 98–107)
CHLORIDE SERPL-SCNC: 107 MMOL/L (ref 98–107)
CHLORIDE SERPL-SCNC: 94 MMOL/L (ref 98–107)
CHLORIDE SERPL-SCNC: 99 MMOL/L (ref 98–107)
CK SERPL-CCNC: 84 U/L (ref 20–200)
CK SERPL-CCNC: 910 U/L (ref 20–200)
CLARITY UR: ABNORMAL
CLARITY UR: CLEAR
CO2 SERPL-SCNC: 18.3 MMOL/L (ref 22–29)
CO2 SERPL-SCNC: 19.5 MMOL/L (ref 22–29)
CO2 SERPL-SCNC: 20.3 MMOL/L (ref 22–29)
CO2 SERPL-SCNC: 22.3 MMOL/L (ref 22–29)
CO2 SERPL-SCNC: 22.7 MMOL/L (ref 22–29)
CO2 SERPL-SCNC: 23 MMOL/L (ref 22–29)
CO2 SERPL-SCNC: 24.3 MMOL/L (ref 22–29)
CO2 SERPL-SCNC: 24.3 MMOL/L (ref 22–29)
CO2 SERPL-SCNC: 24.5 MMOL/L (ref 22–29)
CO2 SERPL-SCNC: 25.1 MMOL/L (ref 22–29)
CO2 SERPL-SCNC: 25.4 MMOL/L (ref 22–29)
CO2 SERPL-SCNC: 25.7 MMOL/L (ref 22–29)
CO2 SERPL-SCNC: 25.8 MMOL/L (ref 22–29)
CO2 SERPL-SCNC: 26.1 MMOL/L (ref 22–29)
CO2 SERPL-SCNC: 26.2 MMOL/L (ref 22–29)
CO2 SERPL-SCNC: 26.3 MMOL/L (ref 22–29)
CO2 SERPL-SCNC: 26.5 MMOL/L (ref 22–29)
CO2 SERPL-SCNC: 26.7 MMOL/L (ref 22–29)
CO2 SERPL-SCNC: 26.7 MMOL/L (ref 22–29)
CO2 SERPL-SCNC: 27 MMOL/L (ref 22–29)
CO2 SERPL-SCNC: 28.7 MMOL/L (ref 22–29)
CO2 SERPL-SCNC: 31.1 MMOL/L (ref 22–29)
CO2 SERPL-SCNC: 31.1 MMOL/L (ref 22–29)
COCAINE UR QL: NEGATIVE
COLOR UR: ABNORMAL
COLOR UR: ABNORMAL
COLOR UR: YELLOW
CREAT BLD-MCNC: 0.7 MG/DL (ref 0.76–1.27)
CREAT BLD-MCNC: 0.71 MG/DL (ref 0.76–1.27)
CREAT BLD-MCNC: 0.83 MG/DL (ref 0.76–1.27)
CREAT BLD-MCNC: 0.88 MG/DL (ref 0.76–1.27)
CREAT BLD-MCNC: 0.91 MG/DL (ref 0.76–1.27)
CREAT BLD-MCNC: 0.92 MG/DL (ref 0.76–1.27)
CREAT BLD-MCNC: 0.93 MG/DL (ref 0.76–1.27)
CREAT BLD-MCNC: 0.94 MG/DL (ref 0.76–1.27)
CREAT BLD-MCNC: 0.98 MG/DL (ref 0.76–1.27)
CREAT BLD-MCNC: 0.99 MG/DL (ref 0.76–1.27)
CREAT BLD-MCNC: 1.02 MG/DL (ref 0.76–1.27)
CREAT BLD-MCNC: 1.02 MG/DL (ref 0.76–1.27)
CREAT BLD-MCNC: 1.05 MG/DL (ref 0.76–1.27)
CREAT BLD-MCNC: 1.07 MG/DL (ref 0.76–1.27)
CREAT BLD-MCNC: 1.08 MG/DL (ref 0.76–1.27)
CREAT BLD-MCNC: 1.11 MG/DL (ref 0.76–1.27)
CREAT BLD-MCNC: 1.18 MG/DL (ref 0.76–1.27)
CREAT BLD-MCNC: 1.28 MG/DL (ref 0.76–1.27)
CREAT BLD-MCNC: 1.29 MG/DL (ref 0.76–1.27)
CREAT BLD-MCNC: 1.55 MG/DL (ref 0.76–1.27)
CREAT BLD-MCNC: 1.62 MG/DL (ref 0.76–1.27)
CYTO UR: NORMAL
D DIMER PPP FEU-MCNC: 2.33 MCGFEU/ML (ref 0–0.49)
D-LACTATE SERPL-SCNC: 1.5 MMOL/L (ref 0.5–2)
D-LACTATE SERPL-SCNC: 1.9 MMOL/L (ref 0.5–2)
DEPRECATED RDW RBC AUTO: 41.6 FL (ref 37–54)
DEPRECATED RDW RBC AUTO: 48.7 FL (ref 37–54)
DEPRECATED RDW RBC AUTO: 50.9 FL (ref 37–54)
DEPRECATED RDW RBC AUTO: 52 FL (ref 37–54)
DEPRECATED RDW RBC AUTO: 52.7 FL (ref 37–54)
DEPRECATED RDW RBC AUTO: 52.8 FL (ref 37–54)
DEPRECATED RDW RBC AUTO: 54.2 FL (ref 37–54)
DEPRECATED RDW RBC AUTO: 55.1 FL (ref 37–54)
DEPRECATED RDW RBC AUTO: 55.3 FL (ref 37–54)
DEPRECATED RDW RBC AUTO: 55.3 FL (ref 37–54)
DEPRECATED RDW RBC AUTO: 57.5 FL (ref 37–54)
DEPRECATED RDW RBC AUTO: 58.4 FL (ref 37–54)
DEPRECATED RDW RBC AUTO: 58.6 FL (ref 37–54)
DEPRECATED RDW RBC AUTO: 60.6 FL (ref 37–54)
DEPRECATED RDW RBC AUTO: 61.5 FL (ref 37–54)
DEPRECATED RDW RBC AUTO: 62.8 FL (ref 37–54)
DEPRECATED RDW RBC AUTO: 63.1 FL (ref 37–54)
DEPRECATED RDW RBC AUTO: 63.3 FL (ref 37–54)
DEPRECATED RDW RBC AUTO: 63.5 FL (ref 37–54)
DEPRECATED RDW RBC AUTO: 64.1 FL (ref 37–54)
DEPRECATED RDW RBC AUTO: 64.4 FL (ref 37–54)
DEPRECATED RDW RBC AUTO: 64.5 FL (ref 37–54)
DEPRECATED RDW RBC AUTO: 67.1 FL (ref 37–54)
DX PRELIMINARY: NORMAL
EOSINOPHIL # BLD AUTO: 0.11 10*3/MM3 (ref 0–0.4)
EOSINOPHIL # BLD AUTO: 0.2 10*3/MM3 (ref 0–0.4)
EOSINOPHIL # BLD AUTO: 0.22 10*3/MM3 (ref 0–0.4)
EOSINOPHIL # BLD AUTO: 0.29 10*3/MM3 (ref 0–0.4)
EOSINOPHIL # BLD AUTO: 0.31 10*3/MM3 (ref 0–0.4)
EOSINOPHIL # BLD AUTO: 0.31 10*3/MM3 (ref 0–0.4)
EOSINOPHIL # BLD AUTO: 0.48 10*3/MM3 (ref 0–0.4)
EOSINOPHIL # BLD AUTO: 0.55 10*3/MM3 (ref 0–0.4)
EOSINOPHIL # BLD AUTO: 0.55 10*3/MM3 (ref 0–0.4)
EOSINOPHIL # BLD AUTO: 0.57 10*3/MM3 (ref 0–0.4)
EOSINOPHIL # BLD AUTO: 0.59 10*3/MM3 (ref 0–0.4)
EOSINOPHIL # BLD AUTO: 0.61 10*3/MM3 (ref 0–0.4)
EOSINOPHIL # BLD AUTO: 0.63 10*3/MM3 (ref 0–0.4)
EOSINOPHIL # BLD AUTO: 0.64 10*3/MM3 (ref 0–0.4)
EOSINOPHIL # BLD AUTO: 0.65 10*3/MM3 (ref 0–0.4)
EOSINOPHIL # BLD AUTO: 0.77 10*3/MM3 (ref 0–0.4)
EOSINOPHIL # BLD AUTO: 0.87 10*3/MM3 (ref 0–0.4)
EOSINOPHIL # BLD AUTO: 0.92 10*3/MM3 (ref 0–0.4)
EOSINOPHIL NFR BLD AUTO: 0.8 % (ref 0.3–6.2)
EOSINOPHIL NFR BLD AUTO: 1.7 % (ref 0.3–6.2)
EOSINOPHIL NFR BLD AUTO: 10 % (ref 0.3–6.2)
EOSINOPHIL NFR BLD AUTO: 2.7 % (ref 0.3–6.2)
EOSINOPHIL NFR BLD AUTO: 2.9 % (ref 0.3–6.2)
EOSINOPHIL NFR BLD AUTO: 4.5 % (ref 0.3–6.2)
EOSINOPHIL NFR BLD AUTO: 6.5 % (ref 0.3–6.2)
EOSINOPHIL NFR BLD AUTO: 6.9 % (ref 0.3–6.2)
EOSINOPHIL NFR BLD AUTO: 6.9 % (ref 0.3–6.2)
EOSINOPHIL NFR BLD AUTO: 7.3 % (ref 0.3–6.2)
EOSINOPHIL NFR BLD AUTO: 7.3 % (ref 0.3–6.2)
EOSINOPHIL NFR BLD AUTO: 7.4 % (ref 0.3–6.2)
EOSINOPHIL NFR BLD AUTO: 7.5 % (ref 0.3–6.2)
EOSINOPHIL NFR BLD AUTO: 7.6 % (ref 0.3–6.2)
EOSINOPHIL NFR BLD AUTO: 7.7 % (ref 0.3–6.2)
EOSINOPHIL NFR BLD AUTO: 8.2 % (ref 0.3–6.2)
EOSINOPHIL NFR BLD AUTO: 8.2 % (ref 0.3–6.2)
EOSINOPHIL NFR BLD AUTO: 9.3 % (ref 0.3–6.2)
ERYTHROCYTE [DISTWIDTH] IN BLOOD BY AUTOMATED COUNT: 12.9 % (ref 12.3–15.4)
ERYTHROCYTE [DISTWIDTH] IN BLOOD BY AUTOMATED COUNT: 14.2 % (ref 12.3–15.4)
ERYTHROCYTE [DISTWIDTH] IN BLOOD BY AUTOMATED COUNT: 14.2 % (ref 12.3–15.4)
ERYTHROCYTE [DISTWIDTH] IN BLOOD BY AUTOMATED COUNT: 14.4 % (ref 12.3–15.4)
ERYTHROCYTE [DISTWIDTH] IN BLOOD BY AUTOMATED COUNT: 14.4 % (ref 12.3–15.4)
ERYTHROCYTE [DISTWIDTH] IN BLOOD BY AUTOMATED COUNT: 14.6 % (ref 12.3–15.4)
ERYTHROCYTE [DISTWIDTH] IN BLOOD BY AUTOMATED COUNT: 15.1 % (ref 12.3–15.4)
ERYTHROCYTE [DISTWIDTH] IN BLOOD BY AUTOMATED COUNT: 15.7 % (ref 12.3–15.4)
ERYTHROCYTE [DISTWIDTH] IN BLOOD BY AUTOMATED COUNT: 16 % (ref 12.3–15.4)
ERYTHROCYTE [DISTWIDTH] IN BLOOD BY AUTOMATED COUNT: 16.3 % (ref 12.3–15.4)
ERYTHROCYTE [DISTWIDTH] IN BLOOD BY AUTOMATED COUNT: 16.3 % (ref 12.3–15.4)
ERYTHROCYTE [DISTWIDTH] IN BLOOD BY AUTOMATED COUNT: 16.5 % (ref 12.3–15.4)
ERYTHROCYTE [DISTWIDTH] IN BLOOD BY AUTOMATED COUNT: 16.5 % (ref 12.3–15.4)
ERYTHROCYTE [DISTWIDTH] IN BLOOD BY AUTOMATED COUNT: 17.2 % (ref 12.3–15.4)
ERYTHROCYTE [DISTWIDTH] IN BLOOD BY AUTOMATED COUNT: 17.2 % (ref 12.3–15.4)
ERYTHROCYTE [DISTWIDTH] IN BLOOD BY AUTOMATED COUNT: 17.5 % (ref 12.3–15.4)
ERYTHROCYTE [DISTWIDTH] IN BLOOD BY AUTOMATED COUNT: 17.8 % (ref 12.3–15.4)
ERYTHROCYTE [DISTWIDTH] IN BLOOD BY AUTOMATED COUNT: 18.1 % (ref 12.3–15.4)
ERYTHROCYTE [DISTWIDTH] IN BLOOD BY AUTOMATED COUNT: 18.2 % (ref 12.3–15.4)
ERYTHROCYTE [DISTWIDTH] IN BLOOD BY AUTOMATED COUNT: 18.3 % (ref 12.3–15.4)
ERYTHROCYTE [DISTWIDTH] IN BLOOD BY AUTOMATED COUNT: 18.3 % (ref 12.3–15.4)
ERYTHROCYTE [DISTWIDTH] IN BLOOD BY AUTOMATED COUNT: 18.4 % (ref 12.3–15.4)
ERYTHROCYTE [DISTWIDTH] IN BLOOD BY AUTOMATED COUNT: 18.4 % (ref 12.3–15.4)
ETHANOL BLD-MCNC: <10 MG/DL (ref 0–10)
ETHANOL UR QL: <0.01 %
FOLATE SERPL-MCNC: 18.7 NG/ML (ref 4.78–24.2)
FOLATE SERPL-MCNC: 7.06 NG/ML (ref 4.78–24.2)
GFR SERPL CREATININE-BSD FRML MDRD: 110 ML/MIN/1.73
GFR SERPL CREATININE-BSD FRML MDRD: 112 ML/MIN/1.73
GFR SERPL CREATININE-BSD FRML MDRD: 42 ML/MIN/1.73
GFR SERPL CREATININE-BSD FRML MDRD: 45 ML/MIN/1.73
GFR SERPL CREATININE-BSD FRML MDRD: 55 ML/MIN/1.73
GFR SERPL CREATININE-BSD FRML MDRD: 56 ML/MIN/1.73
GFR SERPL CREATININE-BSD FRML MDRD: 61 ML/MIN/1.73
GFR SERPL CREATININE-BSD FRML MDRD: 66 ML/MIN/1.73
GFR SERPL CREATININE-BSD FRML MDRD: 68 ML/MIN/1.73
GFR SERPL CREATININE-BSD FRML MDRD: 69 ML/MIN/1.73
GFR SERPL CREATININE-BSD FRML MDRD: 70 ML/MIN/1.73
GFR SERPL CREATININE-BSD FRML MDRD: 72 ML/MIN/1.73
GFR SERPL CREATININE-BSD FRML MDRD: 72 ML/MIN/1.73
GFR SERPL CREATININE-BSD FRML MDRD: 75 ML/MIN/1.73
GFR SERPL CREATININE-BSD FRML MDRD: 76 ML/MIN/1.73
GFR SERPL CREATININE-BSD FRML MDRD: 80 ML/MIN/1.73
GFR SERPL CREATININE-BSD FRML MDRD: 81 ML/MIN/1.73
GFR SERPL CREATININE-BSD FRML MDRD: 82 ML/MIN/1.73
GFR SERPL CREATININE-BSD FRML MDRD: 83 ML/MIN/1.73
GFR SERPL CREATININE-BSD FRML MDRD: 86 ML/MIN/1.73
GFR SERPL CREATININE-BSD FRML MDRD: 92 ML/MIN/1.73
GLOBULIN UR ELPH-MCNC: 2.7 GM/DL
GLOBULIN UR ELPH-MCNC: 3.1 GM/DL
GLOBULIN UR ELPH-MCNC: 3.1 GM/DL
GLOBULIN UR ELPH-MCNC: 3.2 GM/DL
GLOBULIN UR ELPH-MCNC: 3.4 GM/DL
GLOBULIN UR ELPH-MCNC: 3.4 GM/DL
GLOBULIN UR ELPH-MCNC: 3.5 GM/DL
GLOBULIN UR ELPH-MCNC: 3.6 GM/DL
GLUCOSE BLD-MCNC: 100 MG/DL (ref 65–99)
GLUCOSE BLD-MCNC: 105 MG/DL (ref 65–99)
GLUCOSE BLD-MCNC: 108 MG/DL (ref 65–99)
GLUCOSE BLD-MCNC: 115 MG/DL (ref 65–99)
GLUCOSE BLD-MCNC: 116 MG/DL (ref 65–99)
GLUCOSE BLD-MCNC: 116 MG/DL (ref 65–99)
GLUCOSE BLD-MCNC: 117 MG/DL (ref 65–99)
GLUCOSE BLD-MCNC: 118 MG/DL (ref 65–99)
GLUCOSE BLD-MCNC: 120 MG/DL (ref 65–99)
GLUCOSE BLD-MCNC: 122 MG/DL (ref 65–99)
GLUCOSE BLD-MCNC: 122 MG/DL (ref 65–99)
GLUCOSE BLD-MCNC: 125 MG/DL (ref 65–99)
GLUCOSE BLD-MCNC: 134 MG/DL (ref 65–99)
GLUCOSE BLD-MCNC: 147 MG/DL (ref 65–99)
GLUCOSE BLD-MCNC: 160 MG/DL (ref 65–99)
GLUCOSE BLD-MCNC: 175 MG/DL (ref 65–99)
GLUCOSE BLD-MCNC: 73 MG/DL (ref 65–99)
GLUCOSE BLD-MCNC: 76 MG/DL (ref 65–99)
GLUCOSE BLD-MCNC: 83 MG/DL (ref 65–99)
GLUCOSE BLD-MCNC: 85 MG/DL (ref 65–99)
GLUCOSE BLD-MCNC: 86 MG/DL (ref 65–99)
GLUCOSE BLD-MCNC: 97 MG/DL (ref 65–99)
GLUCOSE BLD-MCNC: 98 MG/DL (ref 65–99)
GLUCOSE BLDC GLUCOMTR-MCNC: 102 MG/DL (ref 70–130)
GLUCOSE BLDC GLUCOMTR-MCNC: 104 MG/DL (ref 70–130)
GLUCOSE UR STRIP-MCNC: NEGATIVE MG/DL
HAPTOGLOB SERPL-MCNC: 296 MG/DL (ref 30–200)
HCT VFR BLD AUTO: 23.5 % (ref 37.5–51)
HCT VFR BLD AUTO: 25.2 % (ref 37.5–51)
HCT VFR BLD AUTO: 25.5 % (ref 37.5–51)
HCT VFR BLD AUTO: 25.8 % (ref 37.5–51)
HCT VFR BLD AUTO: 26.3 % (ref 37.5–51)
HCT VFR BLD AUTO: 26.8 % (ref 37.5–51)
HCT VFR BLD AUTO: 27.1 % (ref 37.5–51)
HCT VFR BLD AUTO: 27.2 % (ref 37.5–51)
HCT VFR BLD AUTO: 28 % (ref 37.5–51)
HCT VFR BLD AUTO: 29.1 % (ref 37.5–51)
HCT VFR BLD AUTO: 29.5 % (ref 37.5–51)
HCT VFR BLD AUTO: 31.4 % (ref 37.5–51)
HCT VFR BLD AUTO: 32.2 % (ref 37.5–51)
HCT VFR BLD AUTO: 32.5 % (ref 37.5–51)
HCT VFR BLD AUTO: 32.5 % (ref 37.5–51)
HCT VFR BLD AUTO: 32.6 % (ref 37.5–51)
HCT VFR BLD AUTO: 32.6 % (ref 37.5–51)
HCT VFR BLD AUTO: 32.9 % (ref 37.5–51)
HCT VFR BLD AUTO: 33.2 % (ref 37.5–51)
HCT VFR BLD AUTO: 33.4 % (ref 37.5–51)
HCT VFR BLD AUTO: 35.6 % (ref 37.5–51)
HCT VFR BLD AUTO: 36.1 % (ref 37.5–51)
HCT VFR BLD AUTO: 38.3 % (ref 37.5–51)
HCT VFR BLD AUTO: 41.4 % (ref 37.5–51)
HEMOCCULT STL QL: NEGATIVE
HGB BLD-MCNC: 10 G/DL (ref 13–17.7)
HGB BLD-MCNC: 10.1 G/DL (ref 13–17.7)
HGB BLD-MCNC: 10.1 G/DL (ref 13–17.7)
HGB BLD-MCNC: 10.3 G/DL (ref 13–17.7)
HGB BLD-MCNC: 10.3 G/DL (ref 13–17.7)
HGB BLD-MCNC: 10.4 G/DL (ref 13–17.7)
HGB BLD-MCNC: 10.6 G/DL (ref 13–17.7)
HGB BLD-MCNC: 11.4 G/DL (ref 13–17.7)
HGB BLD-MCNC: 12.2 G/DL (ref 13–17.7)
HGB BLD-MCNC: 12.4 G/DL (ref 13–17.7)
HGB BLD-MCNC: 13.2 G/DL (ref 13–17.7)
HGB BLD-MCNC: 7.3 G/DL (ref 13–17.7)
HGB BLD-MCNC: 7.7 G/DL (ref 13–17.7)
HGB BLD-MCNC: 7.9 G/DL (ref 13–17.7)
HGB BLD-MCNC: 8 G/DL (ref 13–17.7)
HGB BLD-MCNC: 8.1 G/DL (ref 13–17.7)
HGB BLD-MCNC: 8.2 G/DL (ref 13–17.7)
HGB BLD-MCNC: 8.3 G/DL (ref 13–17.7)
HGB BLD-MCNC: 8.3 G/DL (ref 13–17.7)
HGB BLD-MCNC: 8.5 G/DL (ref 13–17.7)
HGB BLD-MCNC: 8.9 G/DL (ref 13–17.7)
HGB BLD-MCNC: 9.3 G/DL (ref 13–17.7)
HGB BLD-MCNC: 9.9 G/DL (ref 13–17.7)
HGB BLD-MCNC: 9.9 G/DL (ref 13–17.7)
HGB UR QL STRIP.AUTO: ABNORMAL
HGB UR QL STRIP.AUTO: NEGATIVE
HOLD SPECIMEN: NORMAL
HOLD SPECIMEN: NORMAL
HYALINE CASTS UR QL AUTO: ABNORMAL /LPF
IMM GRANULOCYTES # BLD AUTO: 0.02 10*3/MM3 (ref 0–0.05)
IMM GRANULOCYTES # BLD AUTO: 0.03 10*3/MM3 (ref 0–0.05)
IMM GRANULOCYTES # BLD AUTO: 0.04 10*3/MM3 (ref 0–0.05)
IMM GRANULOCYTES # BLD AUTO: 0.04 10*3/MM3 (ref 0–0.05)
IMM GRANULOCYTES # BLD AUTO: 0.05 10*3/MM3 (ref 0–0.05)
IMM GRANULOCYTES # BLD AUTO: 0.06 10*3/MM3 (ref 0–0.05)
IMM GRANULOCYTES # BLD AUTO: 0.07 10*3/MM3 (ref 0–0.05)
IMM GRANULOCYTES # BLD AUTO: 0.1 10*3/MM3 (ref 0–0.05)
IMM GRANULOCYTES # BLD AUTO: 0.11 10*3/MM3 (ref 0–0.05)
IMM GRANULOCYTES # BLD AUTO: 0.11 10*3/MM3 (ref 0–0.05)
IMM GRANULOCYTES # BLD AUTO: 0.14 10*3/MM3 (ref 0–0.05)
IMM GRANULOCYTES # BLD AUTO: 0.15 10*3/MM3 (ref 0–0.05)
IMM GRANULOCYTES # BLD AUTO: 0.18 10*3/MM3 (ref 0–0.05)
IMM GRANULOCYTES # BLD AUTO: 0.22 10*3/MM3 (ref 0–0.05)
IMM GRANULOCYTES NFR BLD AUTO: 0.3 % (ref 0–0.5)
IMM GRANULOCYTES NFR BLD AUTO: 0.5 % (ref 0–0.5)
IMM GRANULOCYTES NFR BLD AUTO: 0.7 % (ref 0–0.5)
IMM GRANULOCYTES NFR BLD AUTO: 0.8 % (ref 0–0.5)
IMM GRANULOCYTES NFR BLD AUTO: 1 % (ref 0–0.5)
IMM GRANULOCYTES NFR BLD AUTO: 1.3 % (ref 0–0.5)
IMM GRANULOCYTES NFR BLD AUTO: 1.5 % (ref 0–0.5)
IMM GRANULOCYTES NFR BLD AUTO: 1.7 % (ref 0–0.5)
IMM GRANULOCYTES NFR BLD AUTO: 1.9 % (ref 0–0.5)
IMM GRANULOCYTES NFR BLD AUTO: 2.3 % (ref 0–0.5)
INR PPP: 0.99 (ref 0.9–1.1)
INR PPP: 1.03 (ref 0.9–1.1)
IRON 24H UR-MRATE: 56 MCG/DL (ref 59–158)
IRON SATN MFR SERPL: 23 % (ref 20–50)
KETONES UR QL STRIP: ABNORMAL
KETONES UR QL STRIP: NEGATIVE
LAB AP CASE REPORT: NORMAL
LAB AP CLINICAL INFORMATION: NORMAL
LAB AP DIAGNOSIS COMMENT: NORMAL
LAB AP FLOW CYTOMETRY SUMMARY: NORMAL
LDH SERPL-CCNC: 255 U/L (ref 135–225)
LEUKOCYTE ESTERASE UR QL STRIP.AUTO: ABNORMAL
LEUKOCYTE ESTERASE UR QL STRIP.AUTO: NEGATIVE
LV EF NUC BP: 42 %
LYMPHOCYTES # BLD AUTO: 1.18 10*3/MM3 (ref 0.7–3.1)
LYMPHOCYTES # BLD AUTO: 1.35 10*3/MM3 (ref 0.7–3.1)
LYMPHOCYTES # BLD AUTO: 1.54 10*3/MM3 (ref 0.7–3.1)
LYMPHOCYTES # BLD AUTO: 1.7 10*3/MM3 (ref 0.7–3.1)
LYMPHOCYTES # BLD AUTO: 1.82 10*3/MM3 (ref 0.7–3.1)
LYMPHOCYTES # BLD AUTO: 1.96 10*3/MM3 (ref 0.7–3.1)
LYMPHOCYTES # BLD AUTO: 1.99 10*3/MM3 (ref 0.7–3.1)
LYMPHOCYTES # BLD AUTO: 2.1 10*3/MM3 (ref 0.7–3.1)
LYMPHOCYTES # BLD AUTO: 2.25 10*3/MM3 (ref 0.7–3.1)
LYMPHOCYTES # BLD AUTO: 2.26 10*3/MM3 (ref 0.7–3.1)
LYMPHOCYTES # BLD AUTO: 2.32 10*3/MM3 (ref 0.7–3.1)
LYMPHOCYTES # BLD AUTO: 2.48 10*3/MM3 (ref 0.7–3.1)
LYMPHOCYTES # BLD AUTO: 2.49 10*3/MM3 (ref 0.7–3.1)
LYMPHOCYTES # BLD AUTO: 2.55 10*3/MM3 (ref 0.7–3.1)
LYMPHOCYTES # BLD AUTO: 2.69 10*3/MM3 (ref 0.7–3.1)
LYMPHOCYTES # BLD AUTO: 2.79 10*3/MM3 (ref 0.7–3.1)
LYMPHOCYTES # BLD AUTO: 3.05 10*3/MM3 (ref 0.7–3.1)
LYMPHOCYTES # BLD AUTO: 3.1 10*3/MM3 (ref 0.7–3.1)
LYMPHOCYTES NFR BLD AUTO: 15.2 % (ref 19.6–45.3)
LYMPHOCYTES NFR BLD AUTO: 16.2 % (ref 19.6–45.3)
LYMPHOCYTES NFR BLD AUTO: 18.9 % (ref 19.6–45.3)
LYMPHOCYTES NFR BLD AUTO: 20.2 % (ref 19.6–45.3)
LYMPHOCYTES NFR BLD AUTO: 22.2 % (ref 19.6–45.3)
LYMPHOCYTES NFR BLD AUTO: 23 % (ref 19.6–45.3)
LYMPHOCYTES NFR BLD AUTO: 23.8 % (ref 19.6–45.3)
LYMPHOCYTES NFR BLD AUTO: 23.9 % (ref 19.6–45.3)
LYMPHOCYTES NFR BLD AUTO: 25.1 % (ref 19.6–45.3)
LYMPHOCYTES NFR BLD AUTO: 25.2 % (ref 19.6–45.3)
LYMPHOCYTES NFR BLD AUTO: 26.3 % (ref 19.6–45.3)
LYMPHOCYTES NFR BLD AUTO: 27.7 % (ref 19.6–45.3)
LYMPHOCYTES NFR BLD AUTO: 30.5 % (ref 19.6–45.3)
LYMPHOCYTES NFR BLD AUTO: 30.9 % (ref 19.6–45.3)
LYMPHOCYTES NFR BLD AUTO: 31.8 % (ref 19.6–45.3)
LYMPHOCYTES NFR BLD AUTO: 32.2 % (ref 19.6–45.3)
LYMPHOCYTES NFR BLD AUTO: 40.5 % (ref 19.6–45.3)
LYMPHOCYTES NFR BLD AUTO: 42.1 % (ref 19.6–45.3)
Lab: ABNORMAL
Lab: NORMAL
MAGNESIUM SERPL-MCNC: 1.9 MG/DL (ref 1.6–2.4)
MAGNESIUM SERPL-MCNC: 2.2 MG/DL (ref 1.6–2.4)
MAXIMAL PREDICTED HEART RATE: 151 BPM
MCH RBC QN AUTO: 29.5 PG (ref 26.6–33)
MCH RBC QN AUTO: 29.5 PG (ref 26.6–33)
MCH RBC QN AUTO: 29.6 PG (ref 26.6–33)
MCH RBC QN AUTO: 29.9 PG (ref 26.6–33)
MCH RBC QN AUTO: 30 PG (ref 26.6–33)
MCH RBC QN AUTO: 30.2 PG (ref 26.6–33)
MCH RBC QN AUTO: 30.2 PG (ref 26.6–33)
MCH RBC QN AUTO: 30.3 PG (ref 26.6–33)
MCH RBC QN AUTO: 30.4 PG (ref 26.6–33)
MCH RBC QN AUTO: 30.5 PG (ref 26.6–33)
MCH RBC QN AUTO: 30.6 PG (ref 26.6–33)
MCH RBC QN AUTO: 30.6 PG (ref 26.6–33)
MCH RBC QN AUTO: 30.9 PG (ref 26.6–33)
MCH RBC QN AUTO: 30.9 PG (ref 26.6–33)
MCH RBC QN AUTO: 31 PG (ref 26.6–33)
MCH RBC QN AUTO: 31.2 PG (ref 26.6–33)
MCH RBC QN AUTO: 31.3 PG (ref 26.6–33)
MCH RBC QN AUTO: 31.3 PG (ref 26.6–33)
MCH RBC QN AUTO: 31.5 PG (ref 26.6–33)
MCH RBC QN AUTO: 31.6 PG (ref 26.6–33)
MCHC RBC AUTO-ENTMCNC: 29.6 G/DL (ref 31.5–35.7)
MCHC RBC AUTO-ENTMCNC: 29.8 G/DL (ref 31.5–35.7)
MCHC RBC AUTO-ENTMCNC: 30.1 G/DL (ref 31.5–35.7)
MCHC RBC AUTO-ENTMCNC: 30.4 G/DL (ref 31.5–35.7)
MCHC RBC AUTO-ENTMCNC: 30.6 G/DL (ref 31.5–35.7)
MCHC RBC AUTO-ENTMCNC: 30.8 G/DL (ref 31.5–35.7)
MCHC RBC AUTO-ENTMCNC: 31 G/DL (ref 31.5–35.7)
MCHC RBC AUTO-ENTMCNC: 31 G/DL (ref 31.5–35.7)
MCHC RBC AUTO-ENTMCNC: 31.1 G/DL (ref 31.5–35.7)
MCHC RBC AUTO-ENTMCNC: 31.3 G/DL (ref 31.5–35.7)
MCHC RBC AUTO-ENTMCNC: 31.4 G/DL (ref 31.5–35.7)
MCHC RBC AUTO-ENTMCNC: 31.5 G/DL (ref 31.5–35.7)
MCHC RBC AUTO-ENTMCNC: 31.5 G/DL (ref 31.5–35.7)
MCHC RBC AUTO-ENTMCNC: 31.6 G/DL (ref 31.5–35.7)
MCHC RBC AUTO-ENTMCNC: 31.6 G/DL (ref 31.5–35.7)
MCHC RBC AUTO-ENTMCNC: 31.7 G/DL (ref 31.5–35.7)
MCHC RBC AUTO-ENTMCNC: 31.9 G/DL (ref 31.5–35.7)
MCHC RBC AUTO-ENTMCNC: 31.9 G/DL (ref 31.5–35.7)
MCHC RBC AUTO-ENTMCNC: 32 G/DL (ref 31.5–35.7)
MCHC RBC AUTO-ENTMCNC: 32.4 G/DL (ref 31.5–35.7)
MCHC RBC AUTO-ENTMCNC: 34.3 G/DL (ref 31.5–35.7)
MCV RBC AUTO: 100.3 FL (ref 79–97)
MCV RBC AUTO: 100.4 FL (ref 79–97)
MCV RBC AUTO: 101 FL (ref 79–97)
MCV RBC AUTO: 101.2 FL (ref 79–97)
MCV RBC AUTO: 101.5 FL (ref 79–97)
MCV RBC AUTO: 102.2 FL (ref 79–97)
MCV RBC AUTO: 88.8 FL (ref 79–97)
MCV RBC AUTO: 92.4 FL (ref 79–97)
MCV RBC AUTO: 93.5 FL (ref 79–97)
MCV RBC AUTO: 94.1 FL (ref 79–97)
MCV RBC AUTO: 94.1 FL (ref 79–97)
MCV RBC AUTO: 95.6 FL (ref 79–97)
MCV RBC AUTO: 96.3 FL (ref 79–97)
MCV RBC AUTO: 96.4 FL (ref 79–97)
MCV RBC AUTO: 97.6 FL (ref 79–97)
MCV RBC AUTO: 97.8 FL (ref 79–97)
MCV RBC AUTO: 97.9 FL (ref 79–97)
MCV RBC AUTO: 98.8 FL (ref 79–97)
MCV RBC AUTO: 98.9 FL (ref 79–97)
MCV RBC AUTO: 99.2 FL (ref 79–97)
MCV RBC AUTO: 99.3 FL (ref 79–97)
METHADONE UR QL SCN: NEGATIVE
METHYLMALONATE SERPL-SCNC: 658 NMOL/L (ref 0–378)
MONOCYTES # BLD AUTO: 0.4 10*3/MM3 (ref 0.1–0.9)
MONOCYTES # BLD AUTO: 0.45 10*3/MM3 (ref 0.1–0.9)
MONOCYTES # BLD AUTO: 0.52 10*3/MM3 (ref 0.1–0.9)
MONOCYTES # BLD AUTO: 0.53 10*3/MM3 (ref 0.1–0.9)
MONOCYTES # BLD AUTO: 0.66 10*3/MM3 (ref 0.1–0.9)
MONOCYTES # BLD AUTO: 0.66 10*3/MM3 (ref 0.1–0.9)
MONOCYTES # BLD AUTO: 0.68 10*3/MM3 (ref 0.1–0.9)
MONOCYTES # BLD AUTO: 0.68 10*3/MM3 (ref 0.1–0.9)
MONOCYTES # BLD AUTO: 0.69 10*3/MM3 (ref 0.1–0.9)
MONOCYTES # BLD AUTO: 0.69 10*3/MM3 (ref 0.1–0.9)
MONOCYTES # BLD AUTO: 0.74 10*3/MM3 (ref 0.1–0.9)
MONOCYTES # BLD AUTO: 0.75 10*3/MM3 (ref 0.1–0.9)
MONOCYTES # BLD AUTO: 0.8 10*3/MM3 (ref 0.1–0.9)
MONOCYTES # BLD AUTO: 0.85 10*3/MM3 (ref 0.1–0.9)
MONOCYTES # BLD AUTO: 0.9 10*3/MM3 (ref 0.1–0.9)
MONOCYTES # BLD AUTO: 1.05 10*3/MM3 (ref 0.1–0.9)
MONOCYTES # BLD AUTO: 1.05 10*3/MM3 (ref 0.1–0.9)
MONOCYTES # BLD AUTO: 1.26 10*3/MM3 (ref 0.1–0.9)
MONOCYTES NFR BLD AUTO: 10.1 % (ref 5–12)
MONOCYTES NFR BLD AUTO: 10.1 % (ref 5–12)
MONOCYTES NFR BLD AUTO: 6.1 % (ref 5–12)
MONOCYTES NFR BLD AUTO: 6.9 % (ref 5–12)
MONOCYTES NFR BLD AUTO: 7.2 % (ref 5–12)
MONOCYTES NFR BLD AUTO: 7.3 % (ref 5–12)
MONOCYTES NFR BLD AUTO: 7.5 % (ref 5–12)
MONOCYTES NFR BLD AUTO: 8 % (ref 5–12)
MONOCYTES NFR BLD AUTO: 8.4 % (ref 5–12)
MONOCYTES NFR BLD AUTO: 8.5 % (ref 5–12)
MONOCYTES NFR BLD AUTO: 8.6 % (ref 5–12)
MONOCYTES NFR BLD AUTO: 8.9 % (ref 5–12)
MONOCYTES NFR BLD AUTO: 9.4 % (ref 5–12)
MONOCYTES NFR BLD AUTO: 9.4 % (ref 5–12)
MONOCYTES NFR BLD AUTO: 9.5 % (ref 5–12)
MONOCYTES NFR BLD AUTO: 9.6 % (ref 5–12)
MONOCYTES NFR BLD AUTO: 9.7 % (ref 5–12)
MONOCYTES NFR BLD AUTO: 9.8 % (ref 5–12)
NEUTROPHILS # BLD AUTO: 2.09 10*3/MM3 (ref 1.7–7)
NEUTROPHILS # BLD AUTO: 3.13 10*3/MM3 (ref 1.7–7)
NEUTROPHILS # BLD AUTO: 3.15 10*3/MM3 (ref 1.7–7)
NEUTROPHILS # BLD AUTO: 3.86 10*3/MM3 (ref 1.7–7)
NEUTROPHILS # BLD AUTO: 4.02 10*3/MM3 (ref 1.4–7)
NEUTROPHILS # BLD AUTO: 4.29 10*3/MM3 (ref 1.4–7)
NEUTROPHILS # BLD AUTO: 4.31 10*3/MM3 (ref 1.4–7)
NEUTROPHILS # BLD AUTO: 4.38 10*3/MM3 (ref 1.7–7)
NEUTROPHILS # BLD AUTO: 4.68 10*3/MM3 (ref 1.4–7)
NEUTROPHILS # BLD AUTO: 4.73 10*3/MM3 (ref 1.4–7)
NEUTROPHILS # BLD AUTO: 5.06 10*3/MM3 (ref 1.4–7)
NEUTROPHILS # BLD AUTO: 5.23 10*3/MM3 (ref 1.4–7)
NEUTROPHILS # BLD AUTO: 5.25 10*3/MM3 (ref 1.4–7)
NEUTROPHILS # BLD AUTO: 5.37 10*3/MM3 (ref 1.4–7)
NEUTROPHILS # BLD AUTO: 5.82 10*3/MM3 (ref 1.7–7)
NEUTROPHILS # BLD AUTO: 6.09 10*3/MM3 (ref 1.4–7)
NEUTROPHILS # BLD AUTO: 9.07 10*3/MM3 (ref 1.4–7)
NEUTROPHILS # BLD AUTO: 9.63 10*3/MM3 (ref 1.4–7)
NEUTROPHILS NFR BLD AUTO: 42.6 % (ref 42.7–76)
NEUTROPHILS NFR BLD AUTO: 45.8 % (ref 42.7–76)
NEUTROPHILS NFR BLD AUTO: 49.4 % (ref 42.7–76)
NEUTROPHILS NFR BLD AUTO: 50.1 % (ref 42.7–76)
NEUTROPHILS NFR BLD AUTO: 51.1 % (ref 42.7–76)
NEUTROPHILS NFR BLD AUTO: 55.3 % (ref 42.7–76)
NEUTROPHILS NFR BLD AUTO: 55.5 % (ref 42.7–76)
NEUTROPHILS NFR BLD AUTO: 55.9 % (ref 42.7–76)
NEUTROPHILS NFR BLD AUTO: 56.6 % (ref 42.7–76)
NEUTROPHILS NFR BLD AUTO: 57.3 % (ref 42.7–76)
NEUTROPHILS NFR BLD AUTO: 58.1 % (ref 42.7–76)
NEUTROPHILS NFR BLD AUTO: 58.2 % (ref 42.7–76)
NEUTROPHILS NFR BLD AUTO: 58.3 % (ref 42.7–76)
NEUTROPHILS NFR BLD AUTO: 59.9 % (ref 42.7–76)
NEUTROPHILS NFR BLD AUTO: 64.2 % (ref 42.7–76)
NEUTROPHILS NFR BLD AUTO: 64.3 % (ref 42.7–76)
NEUTROPHILS NFR BLD AUTO: 68.2 % (ref 42.7–76)
NEUTROPHILS NFR BLD AUTO: 73.8 % (ref 42.7–76)
NITRITE UR QL STRIP: NEGATIVE
NITRITE UR QL STRIP: POSITIVE
NITRITE UR QL STRIP: POSITIVE
NRBC BLD AUTO-RTO: 0 /100 WBC (ref 0–0)
NRBC BLD AUTO-RTO: 0 /100 WBC (ref 0–0.2)
NRBC BLD AUTO-RTO: 0.1 /100 WBC (ref 0–0)
NRBC BLD AUTO-RTO: 0.1 /100 WBC (ref 0–0.2)
NRBC BLD AUTO-RTO: 0.2 /100 WBC (ref 0–0)
NRBC BLD AUTO-RTO: 0.3 /100 WBC (ref 0–0)
NT-PROBNP SERPL-MCNC: 285.2 PG/ML (ref 5–900)
NT-PROBNP SERPL-MCNC: 423.1 PG/ML (ref 5–900)
OPIATES UR QL: NEGATIVE
OXYCODONE UR QL SCN: NEGATIVE
PATH REPORT.ADDENDUM SPEC: NORMAL
PATH REPORT.FINAL DX SPEC: NORMAL
PATH REPORT.GROSS SPEC: NORMAL
PERCENT MAX PREDICTED HR: 58.94 %
PH UR STRIP.AUTO: 6 [PH] (ref 5–8)
PH UR STRIP.AUTO: 7 [PH] (ref 5–8)
PH UR STRIP.AUTO: 8 [PH] (ref 5–8)
PH UR STRIP.AUTO: <=5 [PH] (ref 5–8)
PHENYTOIN FREE SERPL-MCNC: 1.8 UG/ML (ref 1–2)
PHENYTOIN SERPL-MCNC: 10.9 MCG/ML (ref 10–20)
PHENYTOIN SERPL-MCNC: 14.2 UG/ML (ref 10–20)
PHENYTOIN SERPL-MCNC: 15.3 MCG/ML (ref 10–20)
PHENYTOIN SERPL-MCNC: 15.3 MCG/ML (ref 10–20)
PHENYTOIN SERPL-MCNC: 21.6 MCG/ML (ref 10–20)
PHENYTOIN SERPL-MCNC: 6.4 MCG/ML (ref 10–20)
PHENYTOIN SERPL-MCNC: 6.4 MCG/ML (ref 10–20)
PLATELET # BLD AUTO: 199 10*3/MM3 (ref 140–450)
PLATELET # BLD AUTO: 201 10*3/MM3 (ref 140–450)
PLATELET # BLD AUTO: 203 10*3/MM3 (ref 140–450)
PLATELET # BLD AUTO: 211 10*3/MM3 (ref 140–450)
PLATELET # BLD AUTO: 215 10*3/MM3 (ref 140–450)
PLATELET # BLD AUTO: 217 10*3/MM3 (ref 140–450)
PLATELET # BLD AUTO: 222 10*3/MM3 (ref 140–450)
PLATELET # BLD AUTO: 227 10*3/MM3 (ref 140–450)
PLATELET # BLD AUTO: 231 10*3/MM3 (ref 140–450)
PLATELET # BLD AUTO: 233 10*3/MM3 (ref 140–450)
PLATELET # BLD AUTO: 235 10*3/MM3 (ref 140–450)
PLATELET # BLD AUTO: 237 10*3/MM3 (ref 140–450)
PLATELET # BLD AUTO: 241 10*3/MM3 (ref 140–450)
PLATELET # BLD AUTO: 258 10*3/MM3 (ref 140–450)
PLATELET # BLD AUTO: 259 10*3/MM3 (ref 140–450)
PLATELET # BLD AUTO: 264 10*3/MM3 (ref 140–450)
PLATELET # BLD AUTO: 272 10*3/MM3 (ref 140–450)
PLATELET # BLD AUTO: 295 10*3/MM3 (ref 140–450)
PLATELET # BLD AUTO: 303 10*3/MM3 (ref 140–450)
PLATELET # BLD AUTO: 308 10*3/MM3 (ref 140–450)
PLATELET # BLD AUTO: 363 10*3/MM3 (ref 140–450)
PLATELET # BLD AUTO: 364 10*3/MM3 (ref 140–450)
PLATELET # BLD AUTO: 371 10*3/MM3 (ref 140–450)
PMV BLD AUTO: 10 FL (ref 6–12)
PMV BLD AUTO: 10 FL (ref 6–12)
PMV BLD AUTO: 8.8 FL (ref 6–12)
PMV BLD AUTO: 8.8 FL (ref 6–12)
PMV BLD AUTO: 8.9 FL (ref 6–12)
PMV BLD AUTO: 8.9 FL (ref 6–12)
PMV BLD AUTO: 9 FL (ref 6–12)
PMV BLD AUTO: 9 FL (ref 6–12)
PMV BLD AUTO: 9.2 FL (ref 6–12)
PMV BLD AUTO: 9.3 FL (ref 6–12)
PMV BLD AUTO: 9.5 FL (ref 6–12)
PMV BLD AUTO: 9.6 FL (ref 6–12)
PMV BLD AUTO: 9.7 FL (ref 6–12)
PMV BLD AUTO: 9.8 FL (ref 6–12)
PMV BLD AUTO: 9.8 FL (ref 6–12)
POTASSIUM BLD-SCNC: 3.3 MMOL/L (ref 3.5–5.2)
POTASSIUM BLD-SCNC: 3.4 MMOL/L (ref 3.5–5.2)
POTASSIUM BLD-SCNC: 3.5 MMOL/L (ref 3.5–5.2)
POTASSIUM BLD-SCNC: 3.6 MMOL/L (ref 3.5–5.2)
POTASSIUM BLD-SCNC: 3.7 MMOL/L (ref 3.5–5.2)
POTASSIUM BLD-SCNC: 3.8 MMOL/L (ref 3.5–5.2)
POTASSIUM BLD-SCNC: 3.9 MMOL/L (ref 3.5–5.2)
POTASSIUM BLD-SCNC: 3.9 MMOL/L (ref 3.5–5.2)
POTASSIUM BLD-SCNC: 4 MMOL/L (ref 3.5–5.2)
POTASSIUM BLD-SCNC: 4.2 MMOL/L (ref 3.5–5.2)
POTASSIUM BLD-SCNC: 4.2 MMOL/L (ref 3.5–5.2)
POTASSIUM BLD-SCNC: 4.3 MMOL/L (ref 3.5–5.2)
POTASSIUM BLD-SCNC: 4.3 MMOL/L (ref 3.5–5.2)
POTASSIUM BLD-SCNC: 4.4 MMOL/L (ref 3.5–5.2)
POTASSIUM BLD-SCNC: 4.5 MMOL/L (ref 3.5–5.2)
POTASSIUM BLD-SCNC: 4.5 MMOL/L (ref 3.5–5.2)
POTASSIUM BLD-SCNC: 4.8 MMOL/L (ref 3.5–5.2)
POTASSIUM BLD-SCNC: 5.1 MMOL/L (ref 3.5–5.2)
PROCALCITONIN SERPL-MCNC: 0.12 NG/ML (ref 0.1–0.25)
PROCALCITONIN SERPL-MCNC: 0.18 NG/ML (ref 0.1–0.25)
PROT SERPL-MCNC: 5.7 G/DL (ref 6–8.5)
PROT SERPL-MCNC: 5.9 G/DL (ref 6–8.5)
PROT SERPL-MCNC: 6.2 G/DL (ref 6–8.5)
PROT SERPL-MCNC: 6.4 G/DL (ref 6–8.5)
PROT SERPL-MCNC: 7 G/DL (ref 6–8.5)
PROT SERPL-MCNC: 7 G/DL (ref 6–8.5)
PROT SERPL-MCNC: 7.1 G/DL (ref 6–8.5)
PROT SERPL-MCNC: 7.1 G/DL (ref 6–8.5)
PROT UR QL STRIP: ABNORMAL
PROT UR QL STRIP: NEGATIVE
PROTHROMBIN TIME: 12.8 SECONDS (ref 11.7–14.2)
PROTHROMBIN TIME: 13.2 SECONDS (ref 11.7–14.2)
RBC # BLD AUTO: 2.34 10*6/MM3 (ref 4.14–5.8)
RBC # BLD AUTO: 2.54 10*6/MM3 (ref 4.14–5.8)
RBC # BLD AUTO: 2.55 10*6/MM3 (ref 4.14–5.8)
RBC # BLD AUTO: 2.58 10*6/MM3 (ref 4.14–5.8)
RBC # BLD AUTO: 2.68 10*6/MM3 (ref 4.14–5.8)
RBC # BLD AUTO: 2.69 10*6/MM3 (ref 4.14–5.8)
RBC # BLD AUTO: 2.74 10*6/MM3 (ref 4.14–5.8)
RBC # BLD AUTO: 2.74 10*6/MM3 (ref 4.14–5.8)
RBC # BLD AUTO: 2.88 10*6/MM3 (ref 4.14–5.8)
RBC # BLD AUTO: 2.97 10*6/MM3 (ref 4.14–5.8)
RBC # BLD AUTO: 3.13 10*6/MM3 (ref 4.14–5.8)
RBC # BLD AUTO: 3.29 10*6/MM3 (ref 4.14–5.8)
RBC # BLD AUTO: 3.33 10*6/MM3 (ref 4.14–5.8)
RBC # BLD AUTO: 3.34 10*6/MM3 (ref 4.14–5.8)
RBC # BLD AUTO: 3.34 10*6/MM3 (ref 4.14–5.8)
RBC # BLD AUTO: 3.37 10*6/MM3 (ref 4.14–5.8)
RBC # BLD AUTO: 3.44 10*6/MM3 (ref 4.14–5.8)
RBC # BLD AUTO: 3.47 10*6/MM3 (ref 4.14–5.8)
RBC # BLD AUTO: 3.53 10*6/MM3 (ref 4.14–5.8)
RBC # BLD AUTO: 3.86 10*6/MM3 (ref 4.14–5.8)
RBC # BLD AUTO: 4.01 10*6/MM3 (ref 4.14–5.8)
RBC # BLD AUTO: 4.07 10*6/MM3 (ref 4.14–5.8)
RBC # BLD AUTO: 4.48 10*6/MM3 (ref 4.14–5.8)
RBC # UR: ABNORMAL /HPF
REF LAB TEST METHOD: ABNORMAL
RETICS # AUTO: 0.09 10*6/MM3 (ref 0.02–0.13)
RETICS # AUTO: 0.1 10*6/MM3 (ref 0.02–0.13)
RETICS/RBC NFR AUTO: 3.68 % (ref 0.5–1.5)
RETICS/RBC NFR AUTO: 3.91 % (ref 0.5–1.5)
RH BLD: POSITIVE
SODIUM BLD-SCNC: 135 MMOL/L (ref 136–145)
SODIUM BLD-SCNC: 135 MMOL/L (ref 136–145)
SODIUM BLD-SCNC: 138 MMOL/L (ref 136–145)
SODIUM BLD-SCNC: 139 MMOL/L (ref 136–145)
SODIUM BLD-SCNC: 140 MMOL/L (ref 136–145)
SODIUM BLD-SCNC: 141 MMOL/L (ref 136–145)
SODIUM BLD-SCNC: 142 MMOL/L (ref 136–145)
SODIUM BLD-SCNC: 143 MMOL/L (ref 136–145)
SODIUM BLD-SCNC: 145 MMOL/L (ref 136–145)
SP GR UR STRIP: 1.01 (ref 1–1.03)
SP GR UR STRIP: 1.02 (ref 1–1.03)
SP GR UR STRIP: 1.03 (ref 1–1.03)
SP GR UR STRIP: 1.03 (ref 1–1.03)
SP GR UR STRIP: >=1.03 (ref 1–1.03)
SQUAMOUS #/AREA URNS HPF: ABNORMAL /HPF
STRESS BASELINE BP: NORMAL MMHG
STRESS BASELINE HR: 77 BPM
STRESS PERCENT HR: 69 %
STRESS POST PEAK BP: NORMAL MMHG
STRESS POST PEAK HR: 89 BPM
STRESS TARGET HR: 128 BPM
T&S EXPIRATION DATE: NORMAL
T4 FREE SERPL-MCNC: 1.6 NG/DL (ref 0.93–1.7)
TIBC SERPL-MCNC: 241 MCG/DL (ref 298–536)
TRANSFERRIN SERPL-MCNC: 162 MG/DL (ref 200–360)
TROPONIN T SERPL-MCNC: 0.01 NG/ML (ref 0–0.03)
TROPONIN T SERPL-MCNC: 0.01 NG/ML (ref 0–0.03)
TROPONIN T SERPL-MCNC: 0.02 NG/ML (ref 0–0.03)
TSH SERPL DL<=0.05 MIU/L-ACNC: 5.28 MIU/ML (ref 0.27–4.2)
TSH SERPL DL<=0.05 MIU/L-ACNC: 5.37 UIU/ML (ref 0.27–4.2)
UNIT  ABO: NORMAL
UNIT  RH: NORMAL
UROBILINOGEN UR QL STRIP: ABNORMAL
UROBILINOGEN UR QL STRIP: NORMAL
VIT B12 BLD-MCNC: 335 PG/ML (ref 211–946)
VIT B12 BLD-MCNC: 865 PG/ML (ref 211–946)
WBC NRBC COR # BLD: 10 10*3/MM3 (ref 3.4–10.8)
WBC NRBC COR # BLD: 10.44 10*3/MM3 (ref 3.4–10.8)
WBC NRBC COR # BLD: 13.06 10*3/MM3 (ref 3.4–10.8)
WBC NRBC COR # BLD: 13.29 10*3/MM3 (ref 3.4–10.8)
WBC NRBC COR # BLD: 4.24 10*3/MM3 (ref 3.4–10.8)
WBC NRBC COR # BLD: 5.53 10*3/MM3 (ref 3.4–10.8)
WBC NRBC COR # BLD: 6 10*3/MM3 (ref 3.4–10.8)
WBC NRBC COR # BLD: 6.79 10*3/MM3 (ref 3.4–10.8)
WBC NRBC COR # BLD: 6.89 10*3/MM3 (ref 3.4–10.8)
WBC NRBC COR # BLD: 7.26 10*3/MM3 (ref 3.4–10.8)
WBC NRBC COR # BLD: 7.3 10*3/MM3 (ref 3.4–10.8)
WBC NRBC COR # BLD: 7.32 10*3/MM3 (ref 3.4–10.8)
WBC NRBC COR # BLD: 7.34 10*3/MM3 (ref 3.4–10.8)
WBC NRBC COR # BLD: 7.36 10*3/MM3 (ref 3.4–10.8)
WBC NRBC COR # BLD: 7.39 10*3/MM3 (ref 3.4–10.8)
WBC NRBC COR # BLD: 7.7 10*3/MM3 (ref 3.4–10.8)
WBC NRBC COR # BLD: 7.77 10*3/MM3 (ref 3.4–10.8)
WBC NRBC COR # BLD: 8.13 10*3/MM3 (ref 3.4–10.8)
WBC NRBC COR # BLD: 8.63 10*3/MM3 (ref 3.4–10.8)
WBC NRBC COR # BLD: 8.83 10*3/MM3 (ref 3.4–10.8)
WBC NRBC COR # BLD: 9.22 10*3/MM3 (ref 3.4–10.8)
WBC NRBC COR # BLD: 9.4 10*3/MM3 (ref 3.4–10.8)
WBC NRBC COR # BLD: 9.48 10*3/MM3 (ref 3.4–10.8)
WBC UR QL AUTO: ABNORMAL /HPF
WHOLE BLOOD HOLD SPECIMEN: NORMAL
WHOLE BLOOD HOLD SPECIMEN: NORMAL

## 2019-01-01 PROCEDURE — 93970 EXTREMITY STUDY: CPT

## 2019-01-01 PROCEDURE — 25010000002 ENOXAPARIN PER 10 MG: Performed by: HOSPITALIST

## 2019-01-01 PROCEDURE — 85027 COMPLETE CBC AUTOMATED: CPT | Performed by: NURSE PRACTITIONER

## 2019-01-01 PROCEDURE — 25010000002 ONDANSETRON PER 1 MG: Performed by: NURSE PRACTITIONER

## 2019-01-01 PROCEDURE — 25010000002 CEFTRIAXONE PER 250 MG: Performed by: INTERNAL MEDICINE

## 2019-01-01 PROCEDURE — 85025 COMPLETE CBC W/AUTO DIFF WBC: CPT | Performed by: HOSPITALIST

## 2019-01-01 PROCEDURE — 83921 ORGANIC ACID SINGLE QUANT: CPT | Performed by: INTERNAL MEDICINE

## 2019-01-01 PROCEDURE — 99232 SBSQ HOSP IP/OBS MODERATE 35: CPT | Performed by: NURSE PRACTITIONER

## 2019-01-01 PROCEDURE — 87077 CULTURE AEROBIC IDENTIFY: CPT | Performed by: HOSPITALIST

## 2019-01-01 PROCEDURE — 85027 COMPLETE CBC AUTOMATED: CPT | Performed by: INTERNAL MEDICINE

## 2019-01-01 PROCEDURE — 99232 SBSQ HOSP IP/OBS MODERATE 35: CPT | Performed by: PSYCHIATRY & NEUROLOGY

## 2019-01-01 PROCEDURE — 84484 ASSAY OF TROPONIN QUANT: CPT | Performed by: PHYSICIAN ASSISTANT

## 2019-01-01 PROCEDURE — 88184 FLOWCYTOMETRY/ TC 1 MARKER: CPT | Performed by: INTERNAL MEDICINE

## 2019-01-01 PROCEDURE — 86900 BLOOD TYPING SEROLOGIC ABO: CPT

## 2019-01-01 PROCEDURE — 83010 ASSAY OF HAPTOGLOBIN QUANT: CPT | Performed by: INTERNAL MEDICINE

## 2019-01-01 PROCEDURE — 25010000002 REGADENOSON 0.4 MG/5ML SOLUTION: Performed by: INTERNAL MEDICINE

## 2019-01-01 PROCEDURE — 87086 URINE CULTURE/COLONY COUNT: CPT | Performed by: HOSPITALIST

## 2019-01-01 PROCEDURE — 92610 EVALUATE SWALLOWING FUNCTION: CPT

## 2019-01-01 PROCEDURE — 25010000002 THIAMINE PER 100 MG: Performed by: HOSPITALIST

## 2019-01-01 PROCEDURE — 97162 PT EVAL MOD COMPLEX 30 MIN: CPT

## 2019-01-01 PROCEDURE — 0DJD8ZZ INSPECTION OF LOWER INTESTINAL TRACT, VIA NATURAL OR ARTIFICIAL OPENING ENDOSCOPIC: ICD-10-PCS | Performed by: INTERNAL MEDICINE

## 2019-01-01 PROCEDURE — 99213 OFFICE O/P EST LOW 20 MIN: CPT | Performed by: PSYCHIATRY & NEUROLOGY

## 2019-01-01 PROCEDURE — 85025 COMPLETE CBC W/AUTO DIFF WBC: CPT | Performed by: EMERGENCY MEDICINE

## 2019-01-01 PROCEDURE — 80185 ASSAY OF PHENYTOIN TOTAL: CPT | Performed by: PSYCHIATRY & NEUROLOGY

## 2019-01-01 PROCEDURE — 99232 SBSQ HOSP IP/OBS MODERATE 35: CPT | Performed by: INTERNAL MEDICINE

## 2019-01-01 PROCEDURE — 92526 ORAL FUNCTION THERAPY: CPT

## 2019-01-01 PROCEDURE — 84443 ASSAY THYROID STIM HORMONE: CPT | Performed by: HOSPITALIST

## 2019-01-01 PROCEDURE — 88323 CONSLTJ&REPRT MATRL PREP SLD: CPT

## 2019-01-01 PROCEDURE — 70450 CT HEAD/BRAIN W/O DYE: CPT

## 2019-01-01 PROCEDURE — 83880 ASSAY OF NATRIURETIC PEPTIDE: CPT | Performed by: NURSE PRACTITIONER

## 2019-01-01 PROCEDURE — 88182 CELL MARKER STUDY: CPT

## 2019-01-01 PROCEDURE — 99222 1ST HOSP IP/OBS MODERATE 55: CPT | Performed by: INTERNAL MEDICINE

## 2019-01-01 PROCEDURE — 86038 ANTINUCLEAR ANTIBODIES: CPT | Performed by: INTERNAL MEDICINE

## 2019-01-01 PROCEDURE — 45378 DIAGNOSTIC COLONOSCOPY: CPT | Performed by: INTERNAL MEDICINE

## 2019-01-01 PROCEDURE — 99284 EMERGENCY DEPT VISIT MOD MDM: CPT

## 2019-01-01 PROCEDURE — G0378 HOSPITAL OBSERVATION PER HR: HCPCS

## 2019-01-01 PROCEDURE — 88313 SPECIAL STAINS GROUP 2: CPT | Performed by: INTERNAL MEDICINE

## 2019-01-01 PROCEDURE — 72130 CT CHEST SPINE W/O & W/DYE: CPT

## 2019-01-01 PROCEDURE — 84466 ASSAY OF TRANSFERRIN: CPT | Performed by: INTERNAL MEDICINE

## 2019-01-01 PROCEDURE — 93005 ELECTROCARDIOGRAM TRACING: CPT | Performed by: INTERNAL MEDICINE

## 2019-01-01 PROCEDURE — 78452 HT MUSCLE IMAGE SPECT MULT: CPT | Performed by: INTERNAL MEDICINE

## 2019-01-01 PROCEDURE — 84439 ASSAY OF FREE THYROXINE: CPT | Performed by: HOSPITALIST

## 2019-01-01 PROCEDURE — 86900 BLOOD TYPING SEROLOGIC ABO: CPT | Performed by: INTERNAL MEDICINE

## 2019-01-01 PROCEDURE — 80048 BASIC METABOLIC PNL TOTAL CA: CPT | Performed by: INTERNAL MEDICINE

## 2019-01-01 PROCEDURE — 86901 BLOOD TYPING SEROLOGIC RH(D): CPT | Performed by: INTERNAL MEDICINE

## 2019-01-01 PROCEDURE — 99231 SBSQ HOSP IP/OBS SF/LOW 25: CPT | Performed by: NURSE PRACTITIONER

## 2019-01-01 PROCEDURE — 88305 TISSUE EXAM BY PATHOLOGIST: CPT | Performed by: INTERNAL MEDICINE

## 2019-01-01 PROCEDURE — 87186 SC STD MICRODIL/AGAR DIL: CPT | Performed by: HOSPITALIST

## 2019-01-01 PROCEDURE — 84484 ASSAY OF TROPONIN QUANT: CPT | Performed by: HOSPITALIST

## 2019-01-01 PROCEDURE — 97110 THERAPEUTIC EXERCISES: CPT

## 2019-01-01 PROCEDURE — 99285 EMERGENCY DEPT VISIT HI MDM: CPT

## 2019-01-01 PROCEDURE — 78452 HT MUSCLE IMAGE SPECT MULT: CPT

## 2019-01-01 PROCEDURE — 81003 URINALYSIS AUTO W/O SCOPE: CPT | Performed by: EMERGENCY MEDICINE

## 2019-01-01 PROCEDURE — 83615 LACTATE (LD) (LDH) ENZYME: CPT | Performed by: INTERNAL MEDICINE

## 2019-01-01 PROCEDURE — 36430 TRANSFUSION BLD/BLD COMPNT: CPT

## 2019-01-01 PROCEDURE — 87040 BLOOD CULTURE FOR BACTERIA: CPT | Performed by: EMERGENCY MEDICINE

## 2019-01-01 PROCEDURE — 80185 ASSAY OF PHENYTOIN TOTAL: CPT | Performed by: NURSE PRACTITIONER

## 2019-01-01 PROCEDURE — 93005 ELECTROCARDIOGRAM TRACING: CPT | Performed by: NURSE PRACTITIONER

## 2019-01-01 PROCEDURE — 85018 HEMOGLOBIN: CPT | Performed by: HOSPITALIST

## 2019-01-01 PROCEDURE — 97535 SELF CARE MNGMENT TRAINING: CPT

## 2019-01-01 PROCEDURE — 93005 ELECTROCARDIOGRAM TRACING: CPT | Performed by: PHYSICIAN ASSISTANT

## 2019-01-01 PROCEDURE — 81001 URINALYSIS AUTO W/SCOPE: CPT | Performed by: NURSE PRACTITIONER

## 2019-01-01 PROCEDURE — 72125 CT NECK SPINE W/O DYE: CPT

## 2019-01-01 PROCEDURE — 80307 DRUG TEST PRSMV CHEM ANLYZR: CPT | Performed by: NURSE PRACTITIONER

## 2019-01-01 PROCEDURE — 80048 BASIC METABOLIC PNL TOTAL CA: CPT | Performed by: NURSE PRACTITIONER

## 2019-01-01 PROCEDURE — 83605 ASSAY OF LACTIC ACID: CPT | Performed by: NURSE PRACTITIONER

## 2019-01-01 PROCEDURE — 85730 THROMBOPLASTIN TIME PARTIAL: CPT | Performed by: INTERNAL MEDICINE

## 2019-01-01 PROCEDURE — 72126 CT NECK SPINE W/DYE: CPT

## 2019-01-01 PROCEDURE — 80185 ASSAY OF PHENYTOIN TOTAL: CPT | Performed by: EMERGENCY MEDICINE

## 2019-01-01 PROCEDURE — 71045 X-RAY EXAM CHEST 1 VIEW: CPT

## 2019-01-01 PROCEDURE — 83605 ASSAY OF LACTIC ACID: CPT | Performed by: EMERGENCY MEDICINE

## 2019-01-01 PROCEDURE — 36415 COLL VENOUS BLD VENIPUNCTURE: CPT

## 2019-01-01 PROCEDURE — 93010 ELECTROCARDIOGRAM REPORT: CPT | Performed by: INTERNAL MEDICINE

## 2019-01-01 PROCEDURE — 07DR3ZX EXTRACTION OF ILIAC BONE MARROW, PERCUTANEOUS APPROACH, DIAGNOSTIC: ICD-10-PCS | Performed by: INTERNAL MEDICINE

## 2019-01-01 PROCEDURE — 85045 AUTOMATED RETICULOCYTE COUNT: CPT | Performed by: HOSPITALIST

## 2019-01-01 PROCEDURE — 25010000002 HYDROMORPHONE PER 4 MG: Performed by: HOSPITALIST

## 2019-01-01 PROCEDURE — 81001 URINALYSIS AUTO W/SCOPE: CPT | Performed by: HOSPITALIST

## 2019-01-01 PROCEDURE — 25010000002 LORAZEPAM PER 2 MG: Performed by: SPECIALIST

## 2019-01-01 PROCEDURE — 99221 1ST HOSP IP/OBS SF/LOW 40: CPT | Performed by: INTERNAL MEDICINE

## 2019-01-01 PROCEDURE — 80048 BASIC METABOLIC PNL TOTAL CA: CPT | Performed by: HOSPITALIST

## 2019-01-01 PROCEDURE — 88237 TISSUE CULTURE BONE MARROW: CPT

## 2019-01-01 PROCEDURE — 74176 CT ABD & PELVIS W/O CONTRAST: CPT

## 2019-01-01 PROCEDURE — 88185 FLOWCYTOMETRY/TC ADD-ON: CPT

## 2019-01-01 PROCEDURE — 80053 COMPREHEN METABOLIC PANEL: CPT | Performed by: EMERGENCY MEDICINE

## 2019-01-01 PROCEDURE — 83540 ASSAY OF IRON: CPT | Performed by: INTERNAL MEDICINE

## 2019-01-01 PROCEDURE — 25010000002 CHLORPROMAZINE HCL 50 MG/2ML SOLUTION: Performed by: PSYCHIATRY & NEUROLOGY

## 2019-01-01 PROCEDURE — 25010000002 CEFTRIAXONE PER 250 MG: Performed by: EMERGENCY MEDICINE

## 2019-01-01 PROCEDURE — 81001 URINALYSIS AUTO W/SCOPE: CPT | Performed by: EMERGENCY MEDICINE

## 2019-01-01 PROCEDURE — A9500 TC99M SESTAMIBI: HCPCS | Performed by: INTERNAL MEDICINE

## 2019-01-01 PROCEDURE — 82607 VITAMIN B-12: CPT | Performed by: HOSPITALIST

## 2019-01-01 PROCEDURE — 96360 HYDRATION IV INFUSION INIT: CPT

## 2019-01-01 PROCEDURE — 25010000002 MORPHINE PER 10 MG: Performed by: NURSE PRACTITIONER

## 2019-01-01 PROCEDURE — 86901 BLOOD TYPING SEROLOGIC RH(D): CPT

## 2019-01-01 PROCEDURE — 83880 ASSAY OF NATRIURETIC PEPTIDE: CPT | Performed by: HOSPITALIST

## 2019-01-01 PROCEDURE — 25010000002 CEFTRIAXONE PER 250 MG: Performed by: HOSPITALIST

## 2019-01-01 PROCEDURE — 84484 ASSAY OF TROPONIN QUANT: CPT | Performed by: EMERGENCY MEDICINE

## 2019-01-01 PROCEDURE — 83735 ASSAY OF MAGNESIUM: CPT | Performed by: EMERGENCY MEDICINE

## 2019-01-01 PROCEDURE — 82746 ASSAY OF FOLIC ACID SERUM: CPT | Performed by: HOSPITALIST

## 2019-01-01 PROCEDURE — P9612 CATHETERIZE FOR URINE SPEC: HCPCS

## 2019-01-01 PROCEDURE — 80053 COMPREHEN METABOLIC PANEL: CPT | Performed by: INTERNAL MEDICINE

## 2019-01-01 PROCEDURE — 85014 HEMATOCRIT: CPT | Performed by: HOSPITALIST

## 2019-01-01 PROCEDURE — 80186 ASSAY OF PHENYTOIN FREE: CPT | Performed by: PSYCHIATRY & NEUROLOGY

## 2019-01-01 PROCEDURE — 82272 OCCULT BLD FECES 1-3 TESTS: CPT | Performed by: INTERNAL MEDICINE

## 2019-01-01 PROCEDURE — 87186 SC STD MICRODIL/AGAR DIL: CPT | Performed by: PSYCHIATRY & NEUROLOGY

## 2019-01-01 PROCEDURE — 85379 FIBRIN DEGRADATION QUANT: CPT | Performed by: INTERNAL MEDICINE

## 2019-01-01 PROCEDURE — 90791 PSYCH DIAGNOSTIC EVALUATION: CPT

## 2019-01-01 PROCEDURE — 99214 OFFICE O/P EST MOD 30 MIN: CPT | Performed by: PSYCHIATRY & NEUROLOGY

## 2019-01-01 PROCEDURE — 84145 PROCALCITONIN (PCT): CPT | Performed by: EMERGENCY MEDICINE

## 2019-01-01 PROCEDURE — 82550 ASSAY OF CK (CPK): CPT | Performed by: HOSPITALIST

## 2019-01-01 PROCEDURE — 87086 URINE CULTURE/COLONY COUNT: CPT | Performed by: NURSE PRACTITIONER

## 2019-01-01 PROCEDURE — 93017 CV STRESS TEST TRACING ONLY: CPT

## 2019-01-01 PROCEDURE — 85025 COMPLETE CBC W/AUTO DIFF WBC: CPT | Performed by: NURSE PRACTITIONER

## 2019-01-01 PROCEDURE — 25010000002 PROPOFOL 10 MG/ML EMULSION: Performed by: NURSE ANESTHETIST, CERTIFIED REGISTERED

## 2019-01-01 PROCEDURE — 71046 X-RAY EXAM CHEST 2 VIEWS: CPT

## 2019-01-01 PROCEDURE — 82962 GLUCOSE BLOOD TEST: CPT

## 2019-01-01 PROCEDURE — 99233 SBSQ HOSP IP/OBS HIGH 50: CPT | Performed by: INTERNAL MEDICINE

## 2019-01-01 PROCEDURE — 84484 ASSAY OF TROPONIN QUANT: CPT | Performed by: NURSE PRACTITIONER

## 2019-01-01 PROCEDURE — 88341 IMHCHEM/IMCYTCHM EA ADD ANTB: CPT

## 2019-01-01 PROCEDURE — 82550 ASSAY OF CK (CPK): CPT | Performed by: EMERGENCY MEDICINE

## 2019-01-01 PROCEDURE — 80053 COMPREHEN METABOLIC PANEL: CPT | Performed by: NURSE PRACTITIONER

## 2019-01-01 PROCEDURE — 25010000002 IOPAMIDOL 61 % SOLUTION: Performed by: HOSPITALIST

## 2019-01-01 PROCEDURE — 80185 ASSAY OF PHENYTOIN TOTAL: CPT

## 2019-01-01 PROCEDURE — 81001 URINALYSIS AUTO W/SCOPE: CPT | Performed by: PSYCHIATRY & NEUROLOGY

## 2019-01-01 PROCEDURE — 93018 CV STRESS TEST I&R ONLY: CPT | Performed by: INTERNAL MEDICINE

## 2019-01-01 PROCEDURE — 0 TECHNETIUM SESTAMIBI: Performed by: INTERNAL MEDICINE

## 2019-01-01 PROCEDURE — 36415 COLL VENOUS BLD VENIPUNCTURE: CPT | Performed by: NURSE PRACTITIONER

## 2019-01-01 PROCEDURE — 25010000002 HALOPERIDOL LACTATE PER 5 MG: Performed by: PSYCHIATRY & NEUROLOGY

## 2019-01-01 PROCEDURE — 83735 ASSAY OF MAGNESIUM: CPT | Performed by: INTERNAL MEDICINE

## 2019-01-01 PROCEDURE — 63710000001 DIPHENHYDRAMINE PER 50 MG: Performed by: INTERNAL MEDICINE

## 2019-01-01 PROCEDURE — 88300 SURGICAL PATH GROSS: CPT | Performed by: INTERNAL MEDICINE

## 2019-01-01 PROCEDURE — 99231 SBSQ HOSP IP/OBS SF/LOW 25: CPT | Performed by: INTERNAL MEDICINE

## 2019-01-01 PROCEDURE — 86923 COMPATIBILITY TEST ELECTRIC: CPT

## 2019-01-01 PROCEDURE — 97165 OT EVAL LOW COMPLEX 30 MIN: CPT

## 2019-01-01 PROCEDURE — 81003 URINALYSIS AUTO W/O SCOPE: CPT | Performed by: NURSE PRACTITIONER

## 2019-01-01 PROCEDURE — 85610 PROTHROMBIN TIME: CPT | Performed by: INTERNAL MEDICINE

## 2019-01-01 PROCEDURE — 88264 CHROMOSOME ANALYSIS 20-25: CPT

## 2019-01-01 PROCEDURE — 94762 N-INVAS EAR/PLS OXIMTRY CONT: CPT

## 2019-01-01 PROCEDURE — 80053 COMPREHEN METABOLIC PANEL: CPT | Performed by: HOSPITALIST

## 2019-01-01 PROCEDURE — 88311 DECALCIFY TISSUE: CPT | Performed by: INTERNAL MEDICINE

## 2019-01-01 PROCEDURE — 87086 URINE CULTURE/COLONY COUNT: CPT | Performed by: EMERGENCY MEDICINE

## 2019-01-01 PROCEDURE — 96372 THER/PROPH/DIAG INJ SC/IM: CPT

## 2019-01-01 PROCEDURE — P9016 RBC LEUKOCYTES REDUCED: HCPCS

## 2019-01-01 PROCEDURE — 87040 BLOOD CULTURE FOR BACTERIA: CPT | Performed by: NURSE PRACTITIONER

## 2019-01-01 PROCEDURE — 87086 URINE CULTURE/COLONY COUNT: CPT | Performed by: PSYCHIATRY & NEUROLOGY

## 2019-01-01 PROCEDURE — 88342 IMHCHEM/IMCYTCHM 1ST ANTB: CPT

## 2019-01-01 PROCEDURE — 94799 UNLISTED PULMONARY SVC/PX: CPT

## 2019-01-01 PROCEDURE — 76775 US EXAM ABDO BACK WALL LIM: CPT

## 2019-01-01 PROCEDURE — 99231 SBSQ HOSP IP/OBS SF/LOW 25: CPT | Performed by: ANESTHESIOLOGY

## 2019-01-01 PROCEDURE — 77012 CT SCAN FOR NEEDLE BIOPSY: CPT

## 2019-01-01 PROCEDURE — 86850 RBC ANTIBODY SCREEN: CPT | Performed by: INTERNAL MEDICINE

## 2019-01-01 PROCEDURE — 93016 CV STRESS TEST SUPVJ ONLY: CPT | Performed by: INTERNAL MEDICINE

## 2019-01-01 PROCEDURE — 85045 AUTOMATED RETICULOCYTE COUNT: CPT | Performed by: INTERNAL MEDICINE

## 2019-01-01 RX ORDER — ACETAMINOPHEN 325 MG/1
650 TABLET ORAL EVERY 6 HOURS PRN
Status: DISCONTINUED | OUTPATIENT
Start: 2019-01-01 | End: 2019-01-01 | Stop reason: HOSPADM

## 2019-01-01 RX ORDER — ASPIRIN 325 MG
650 TABLET ORAL DAILY
COMMUNITY

## 2019-01-01 RX ORDER — BISACODYL 10 MG
10 SUPPOSITORY, RECTAL RECTAL DAILY PRN
Start: 2019-01-01

## 2019-01-01 RX ORDER — LORAZEPAM 2 MG/ML
2 INJECTION INTRAMUSCULAR ONCE
Status: COMPLETED | OUTPATIENT
Start: 2019-01-01 | End: 2019-01-01

## 2019-01-01 RX ORDER — LIDOCAINE HYDROCHLORIDE 10 MG/ML
20 INJECTION, SOLUTION INFILTRATION; PERINEURAL ONCE
Status: COMPLETED | OUTPATIENT
Start: 2019-01-01 | End: 2019-01-01

## 2019-01-01 RX ORDER — TAMSULOSIN HYDROCHLORIDE 0.4 MG/1
0.4 CAPSULE ORAL DAILY
Qty: 30 CAPSULE
Start: 2019-01-01

## 2019-01-01 RX ORDER — OLANZAPINE 10 MG/1
10 TABLET ORAL NIGHTLY
Status: DISCONTINUED | OUTPATIENT
Start: 2019-01-01 | End: 2019-01-01

## 2019-01-01 RX ORDER — ACETAMINOPHEN 650 MG/1
650 SUPPOSITORY RECTAL EVERY 4 HOURS PRN
Status: DISCONTINUED | OUTPATIENT
Start: 2019-01-01 | End: 2019-01-01 | Stop reason: HOSPADM

## 2019-01-01 RX ORDER — SODIUM CHLORIDE 0.9 % (FLUSH) 0.9 %
10 SYRINGE (ML) INJECTION EVERY 12 HOURS SCHEDULED
Status: DISCONTINUED | OUTPATIENT
Start: 2019-01-01 | End: 2019-01-01 | Stop reason: HOSPADM

## 2019-01-01 RX ORDER — SODIUM CHLORIDE 9 MG/ML
100 INJECTION, SOLUTION INTRAVENOUS CONTINUOUS
Status: DISCONTINUED | OUTPATIENT
Start: 2019-01-01 | End: 2019-01-01

## 2019-01-01 RX ORDER — PHENYTOIN SODIUM 100 MG/1
500 CAPSULE, EXTENDED RELEASE ORAL NIGHTLY
Status: DISCONTINUED | OUTPATIENT
Start: 2019-01-01 | End: 2019-01-01 | Stop reason: HOSPADM

## 2019-01-01 RX ORDER — ONDANSETRON 2 MG/ML
4 INJECTION INTRAMUSCULAR; INTRAVENOUS EVERY 6 HOURS PRN
Status: DISCONTINUED | OUTPATIENT
Start: 2019-01-01 | End: 2019-01-01 | Stop reason: HOSPADM

## 2019-01-01 RX ORDER — FOLIC ACID 1 MG/1
1 TABLET ORAL DAILY
Status: DISCONTINUED | OUTPATIENT
Start: 2019-01-01 | End: 2019-01-01 | Stop reason: HOSPADM

## 2019-01-01 RX ORDER — AMIODARONE HYDROCHLORIDE 200 MG/1
200 TABLET ORAL DAILY
COMMUNITY
End: 2019-01-01 | Stop reason: HOSPADM

## 2019-01-01 RX ORDER — AMIODARONE HYDROCHLORIDE 200 MG/1
200 TABLET ORAL DAILY
Status: DISCONTINUED | OUTPATIENT
Start: 2019-01-01 | End: 2019-01-01

## 2019-01-01 RX ORDER — MAGNESIUM CARB/ALUMINUM HYDROX 105-160MG
296 TABLET,CHEWABLE ORAL ONCE
Status: COMPLETED | OUTPATIENT
Start: 2019-01-01 | End: 2019-01-01

## 2019-01-01 RX ORDER — SODIUM CHLORIDE 0.9 % (FLUSH) 0.9 %
3 SYRINGE (ML) INJECTION EVERY 12 HOURS SCHEDULED
Status: DISCONTINUED | OUTPATIENT
Start: 2019-01-01 | End: 2019-01-01

## 2019-01-01 RX ORDER — SODIUM CHLORIDE 0.9 % (FLUSH) 0.9 %
3 SYRINGE (ML) INJECTION EVERY 12 HOURS SCHEDULED
Status: DISCONTINUED | OUTPATIENT
Start: 2019-01-01 | End: 2019-01-01 | Stop reason: HOSPADM

## 2019-01-01 RX ORDER — BISACODYL 10 MG
10 SUPPOSITORY, RECTAL RECTAL DAILY PRN
Status: DISCONTINUED | OUTPATIENT
Start: 2019-01-01 | End: 2019-01-01 | Stop reason: HOSPADM

## 2019-01-01 RX ORDER — HYDROCODONE BITARTRATE AND ACETAMINOPHEN 7.5; 325 MG/1; MG/1
1 TABLET ORAL EVERY 6 HOURS PRN
Qty: 9 TABLET | Refills: 0 | Status: SHIPPED | OUTPATIENT
Start: 2019-01-01 | End: 2019-01-01

## 2019-01-01 RX ORDER — TIMOLOL MALEATE 5 MG/ML
1 SOLUTION/ DROPS OPHTHALMIC EVERY 12 HOURS SCHEDULED
Status: DISCONTINUED | OUTPATIENT
Start: 2019-01-01 | End: 2019-01-01 | Stop reason: HOSPADM

## 2019-01-01 RX ORDER — LEVOTHYROXINE SODIUM 88 UG/1
88 TABLET ORAL DAILY
Status: DISCONTINUED | OUTPATIENT
Start: 2019-01-01 | End: 2019-01-01 | Stop reason: HOSPADM

## 2019-01-01 RX ORDER — CEFTRIAXONE SODIUM 1 G/50ML
1 INJECTION, SOLUTION INTRAVENOUS EVERY 24 HOURS
Status: COMPLETED | OUTPATIENT
Start: 2019-01-01 | End: 2019-01-01

## 2019-01-01 RX ORDER — ACETAMINOPHEN 160 MG/5ML
650 SOLUTION ORAL EVERY 4 HOURS PRN
Status: DISCONTINUED | OUTPATIENT
Start: 2019-01-01 | End: 2019-01-01 | Stop reason: HOSPADM

## 2019-01-01 RX ORDER — SODIUM CHLORIDE 0.9 % (FLUSH) 0.9 %
3-10 SYRINGE (ML) INJECTION AS NEEDED
Status: DISCONTINUED | OUTPATIENT
Start: 2019-01-01 | End: 2019-01-01 | Stop reason: HOSPADM

## 2019-01-01 RX ORDER — OLANZAPINE 10 MG/1
10 TABLET ORAL NIGHTLY
Start: 2019-01-01 | End: 2019-01-01 | Stop reason: HOSPADM

## 2019-01-01 RX ORDER — LIDOCAINE HYDROCHLORIDE 10 MG/ML
2.1 INJECTION, SOLUTION EPIDURAL; INFILTRATION; INTRACAUDAL; PERINEURAL ONCE
Status: COMPLETED | OUTPATIENT
Start: 2019-01-01 | End: 2019-01-01

## 2019-01-01 RX ORDER — THIAMINE MONONITRATE (VIT B1) 100 MG
100 TABLET ORAL DAILY
Status: DISCONTINUED | OUTPATIENT
Start: 2019-01-01 | End: 2019-01-01 | Stop reason: HOSPADM

## 2019-01-01 RX ORDER — ONDANSETRON 2 MG/ML
4 INJECTION INTRAMUSCULAR; INTRAVENOUS ONCE
Status: COMPLETED | OUTPATIENT
Start: 2019-01-01 | End: 2019-01-01

## 2019-01-01 RX ORDER — OLANZAPINE 2.5 MG/1
2.5 TABLET ORAL 2 TIMES DAILY
Start: 2019-01-01

## 2019-01-01 RX ORDER — CEFTRIAXONE 1 G/1
1000 INJECTION, POWDER, FOR SOLUTION INTRAMUSCULAR; INTRAVENOUS ONCE
Status: COMPLETED | OUTPATIENT
Start: 2019-01-01 | End: 2019-01-01

## 2019-01-01 RX ORDER — QUETIAPINE FUMARATE 25 MG/1
25 TABLET, FILM COATED ORAL NIGHTLY
Status: DISCONTINUED | OUTPATIENT
Start: 2019-01-01 | End: 2019-01-01

## 2019-01-01 RX ORDER — HYDROCODONE BITARTRATE AND ACETAMINOPHEN 7.5; 325 MG/1; MG/1
1 TABLET ORAL EVERY 6 HOURS PRN
Status: DISPENSED | OUTPATIENT
Start: 2019-01-01 | End: 2019-01-01

## 2019-01-01 RX ORDER — DULOXETIN HYDROCHLORIDE 60 MG/1
120 CAPSULE, DELAYED RELEASE ORAL NIGHTLY
Status: DISCONTINUED | OUTPATIENT
Start: 2019-01-01 | End: 2019-01-01 | Stop reason: HOSPADM

## 2019-01-01 RX ORDER — LEVOTHYROXINE SODIUM 88 UG/1
88 TABLET ORAL
Status: DISCONTINUED | OUTPATIENT
Start: 2019-01-01 | End: 2019-01-01 | Stop reason: HOSPADM

## 2019-01-01 RX ORDER — CHLORPROMAZINE HYDROCHLORIDE 25 MG/ML
50 INJECTION INTRAMUSCULAR EVERY 4 HOURS PRN
Status: DISCONTINUED | OUTPATIENT
Start: 2019-01-01 | End: 2019-01-01

## 2019-01-01 RX ORDER — DIPHENHYDRAMINE HCL 25 MG
25 CAPSULE ORAL ONCE
Status: COMPLETED | OUTPATIENT
Start: 2019-01-01 | End: 2019-01-01

## 2019-01-01 RX ORDER — PHENYTOIN SODIUM 100 MG/1
CAPSULE, EXTENDED RELEASE ORAL
Qty: 150 CAPSULE | Refills: 6 | Status: SHIPPED | OUTPATIENT
Start: 2019-01-01

## 2019-01-01 RX ORDER — TAMSULOSIN HYDROCHLORIDE 0.4 MG/1
0.4 CAPSULE ORAL DAILY
Status: DISCONTINUED | OUTPATIENT
Start: 2019-01-01 | End: 2019-01-01 | Stop reason: HOSPADM

## 2019-01-01 RX ORDER — OXYCODONE HYDROCHLORIDE AND ACETAMINOPHEN 5; 325 MG/1; MG/1
2 TABLET ORAL ONCE
Status: COMPLETED | OUTPATIENT
Start: 2019-01-01 | End: 2019-01-01

## 2019-01-01 RX ORDER — DORZOLAMIDE HCL 20 MG/ML
1 SOLUTION/ DROPS OPHTHALMIC 2 TIMES DAILY
Status: DISCONTINUED | OUTPATIENT
Start: 2019-01-01 | End: 2019-01-01 | Stop reason: HOSPADM

## 2019-01-01 RX ORDER — SODIUM CHLORIDE 0.9 % (FLUSH) 0.9 %
10 SYRINGE (ML) INJECTION AS NEEDED
Status: DISCONTINUED | OUTPATIENT
Start: 2019-01-01 | End: 2019-01-01 | Stop reason: HOSPADM

## 2019-01-01 RX ORDER — CEFDINIR 300 MG/1
300 CAPSULE ORAL EVERY 12 HOURS SCHEDULED
Status: COMPLETED | OUTPATIENT
Start: 2019-01-01 | End: 2019-01-01

## 2019-01-01 RX ORDER — PETROLATUM 42 G/100G
OINTMENT TOPICAL EVERY 12 HOURS SCHEDULED
Start: 2019-01-01 | End: 2019-01-01 | Stop reason: HOSPADM

## 2019-01-01 RX ORDER — HYDROCODONE BITARTRATE AND ACETAMINOPHEN 7.5; 325 MG/1; MG/1
1 TABLET ORAL EVERY 6 HOURS PRN
Status: DISCONTINUED | OUTPATIENT
Start: 2019-01-01 | End: 2019-01-01 | Stop reason: HOSPADM

## 2019-01-01 RX ORDER — LORAZEPAM 2 MG/ML
INJECTION INTRAMUSCULAR
Status: DISPENSED
Start: 2019-01-01 | End: 2019-01-01

## 2019-01-01 RX ORDER — OLANZAPINE 10 MG/1
10 INJECTION, POWDER, LYOPHILIZED, FOR SOLUTION INTRAMUSCULAR EVERY 8 HOURS PRN
Status: DISCONTINUED | OUTPATIENT
Start: 2019-01-01 | End: 2019-01-01

## 2019-01-01 RX ORDER — HYDROMORPHONE HYDROCHLORIDE 1 MG/ML
0.25 INJECTION, SOLUTION INTRAMUSCULAR; INTRAVENOUS; SUBCUTANEOUS
Status: DISCONTINUED | OUTPATIENT
Start: 2019-01-01 | End: 2019-01-01

## 2019-01-01 RX ORDER — SODIUM CHLORIDE 0.9 % (FLUSH) 0.9 %
1-10 SYRINGE (ML) INJECTION AS NEEDED
Status: DISCONTINUED | OUTPATIENT
Start: 2019-01-01 | End: 2019-01-01

## 2019-01-01 RX ORDER — HYDROMORPHONE HYDROCHLORIDE 1 MG/ML
0.5 INJECTION, SOLUTION INTRAMUSCULAR; INTRAVENOUS; SUBCUTANEOUS ONCE
Status: COMPLETED | OUTPATIENT
Start: 2019-01-01 | End: 2019-01-01

## 2019-01-01 RX ORDER — ACETAMINOPHEN 325 MG/1
650 TABLET ORAL EVERY 4 HOURS PRN
Status: DISCONTINUED | OUTPATIENT
Start: 2019-01-01 | End: 2019-01-01 | Stop reason: HOSPADM

## 2019-01-01 RX ORDER — PHENYTOIN SODIUM 100 MG/1
CAPSULE, EXTENDED RELEASE ORAL
Qty: 150 CAPSULE | Refills: 6 | Status: SHIPPED | OUTPATIENT
Start: 2019-01-01 | End: 2019-01-01 | Stop reason: SDUPTHER

## 2019-01-01 RX ORDER — FINASTERIDE 5 MG/1
5 TABLET, FILM COATED ORAL DAILY
Status: DISCONTINUED | OUTPATIENT
Start: 2019-01-01 | End: 2019-01-01 | Stop reason: HOSPADM

## 2019-01-01 RX ORDER — SODIUM CHLORIDE 9 MG/ML
125 INJECTION, SOLUTION INTRAVENOUS CONTINUOUS
Status: DISCONTINUED | OUTPATIENT
Start: 2019-01-01 | End: 2019-01-01

## 2019-01-01 RX ORDER — SULFAMETHOXAZOLE AND TRIMETHOPRIM 800; 160 MG/1; MG/1
1 TABLET ORAL 2 TIMES DAILY
Qty: 14 TABLET | Refills: 0 | Status: SHIPPED | OUTPATIENT
Start: 2019-01-01

## 2019-01-01 RX ORDER — ZIPRASIDONE HYDROCHLORIDE 20 MG/1
20 CAPSULE ORAL
Status: DISCONTINUED | OUTPATIENT
Start: 2019-01-01 | End: 2019-01-01

## 2019-01-01 RX ORDER — HALOPERIDOL 5 MG/ML
2 INJECTION INTRAMUSCULAR
Status: DISCONTINUED | OUTPATIENT
Start: 2019-01-01 | End: 2019-01-01

## 2019-01-01 RX ORDER — PHENYTOIN SODIUM 100 MG/1
500 CAPSULE, EXTENDED RELEASE ORAL DAILY
Status: DISCONTINUED | OUTPATIENT
Start: 2019-01-01 | End: 2019-01-01

## 2019-01-01 RX ORDER — MORPHINE SULFATE 2 MG/ML
4 INJECTION, SOLUTION INTRAMUSCULAR; INTRAVENOUS ONCE
Status: COMPLETED | OUTPATIENT
Start: 2019-01-01 | End: 2019-01-01

## 2019-01-01 RX ORDER — PREGABALIN 75 MG/1
150 CAPSULE ORAL 2 TIMES DAILY
Status: DISCONTINUED | OUTPATIENT
Start: 2019-01-01 | End: 2019-01-01

## 2019-01-01 RX ORDER — OLANZAPINE 10 MG/1
10 INJECTION, POWDER, LYOPHILIZED, FOR SOLUTION INTRAMUSCULAR EVERY 8 HOURS PRN
Status: DISCONTINUED | OUTPATIENT
Start: 2019-01-01 | End: 2019-01-01 | Stop reason: HOSPADM

## 2019-01-01 RX ORDER — PROPOFOL 10 MG/ML
VIAL (ML) INTRAVENOUS CONTINUOUS PRN
Status: DISCONTINUED | OUTPATIENT
Start: 2019-01-01 | End: 2019-01-01 | Stop reason: SURG

## 2019-01-01 RX ORDER — POTASSIUM CHLORIDE 750 MG/1
40 CAPSULE, EXTENDED RELEASE ORAL ONCE
Status: COMPLETED | OUTPATIENT
Start: 2019-01-01 | End: 2019-01-01

## 2019-01-01 RX ORDER — OLANZAPINE 5 MG/1
5 TABLET ORAL 2 TIMES DAILY
Status: DISCONTINUED | OUTPATIENT
Start: 2019-01-01 | End: 2019-01-01

## 2019-01-01 RX ORDER — LORAZEPAM 2 MG/ML
1 INJECTION INTRAMUSCULAR ONCE
Status: DISCONTINUED | OUTPATIENT
Start: 2019-01-01 | End: 2019-01-01

## 2019-01-01 RX ORDER — PETROLATUM 42 G/100G
OINTMENT TOPICAL EVERY 12 HOURS SCHEDULED
Status: DISCONTINUED | OUTPATIENT
Start: 2019-01-01 | End: 2019-01-01 | Stop reason: HOSPADM

## 2019-01-01 RX ORDER — BACLOFEN 10 MG/1
5 TABLET ORAL EVERY 12 HOURS SCHEDULED
Status: DISCONTINUED | OUTPATIENT
Start: 2019-01-01 | End: 2019-01-01 | Stop reason: HOSPADM

## 2019-01-01 RX ORDER — HYDROCODONE BITARTRATE AND ACETAMINOPHEN 5; 325 MG/1; MG/1
1 TABLET ORAL EVERY 4 HOURS PRN
Qty: 12 TABLET | Refills: 0 | Status: SHIPPED | OUTPATIENT
Start: 2019-01-01 | End: 2019-01-01

## 2019-01-01 RX ORDER — BACLOFEN 5 MG/1
5 TABLET ORAL EVERY 12 HOURS SCHEDULED
Start: 2019-01-01

## 2019-01-01 RX ORDER — PREGABALIN 150 MG/1
300 CAPSULE ORAL 2 TIMES DAILY
Qty: 18 CAPSULE | Refills: 0 | Status: SHIPPED | OUTPATIENT
Start: 2019-01-01 | End: 2019-01-01 | Stop reason: HOSPADM

## 2019-01-01 RX ORDER — SENNA AND DOCUSATE SODIUM 50; 8.6 MG/1; MG/1
2 TABLET, FILM COATED ORAL NIGHTLY
Start: 2019-01-01

## 2019-01-01 RX ORDER — OLANZAPINE 2.5 MG/1
2.5 TABLET ORAL 2 TIMES DAILY
Status: DISCONTINUED | OUTPATIENT
Start: 2019-01-01 | End: 2019-01-01 | Stop reason: HOSPADM

## 2019-01-01 RX ORDER — ASPIRIN 325 MG
650 TABLET ORAL DAILY
Status: DISCONTINUED | OUTPATIENT
Start: 2019-01-01 | End: 2019-01-01 | Stop reason: HOSPADM

## 2019-01-01 RX ORDER — HYDROCODONE BITARTRATE AND ACETAMINOPHEN 5; 325 MG/1; MG/1
1 TABLET ORAL EVERY 4 HOURS PRN
Status: DISCONTINUED | OUTPATIENT
Start: 2019-01-01 | End: 2019-01-01 | Stop reason: HOSPADM

## 2019-01-01 RX ORDER — LANOLIN ALCOHOL/MO/W.PET/CERES
100 CREAM (GRAM) TOPICAL DAILY
Start: 2019-01-01

## 2019-01-01 RX ORDER — SODIUM CHLORIDE 9 MG/ML
75 INJECTION, SOLUTION INTRAVENOUS CONTINUOUS
Status: DISCONTINUED | OUTPATIENT
Start: 2019-01-01 | End: 2019-01-01

## 2019-01-01 RX ORDER — SENNA AND DOCUSATE SODIUM 50; 8.6 MG/1; MG/1
2 TABLET, FILM COATED ORAL NIGHTLY
Status: DISCONTINUED | OUTPATIENT
Start: 2019-01-01 | End: 2019-01-01 | Stop reason: HOSPADM

## 2019-01-01 RX ORDER — ASPIRIN 325 MG
325 TABLET ORAL DAILY
Status: DISCONTINUED | OUTPATIENT
Start: 2019-01-01 | End: 2019-01-01 | Stop reason: HOSPADM

## 2019-01-01 RX ORDER — DULOXETIN HYDROCHLORIDE 60 MG/1
120 CAPSULE, DELAYED RELEASE ORAL NIGHTLY
Status: DISCONTINUED | OUTPATIENT
Start: 2019-01-01 | End: 2019-01-01

## 2019-01-01 RX ORDER — DORZOLAMIDE HYDROCHLORIDE AND TIMOLOL MALEATE 20; 5 MG/ML; MG/ML
1 SOLUTION/ DROPS OPHTHALMIC 2 TIMES DAILY
Qty: 10 ML | Refills: 1 | Status: SHIPPED | OUTPATIENT
Start: 2019-01-01

## 2019-01-01 RX ORDER — SODIUM CHLORIDE, SODIUM LACTATE, POTASSIUM CHLORIDE, CALCIUM CHLORIDE 600; 310; 30; 20 MG/100ML; MG/100ML; MG/100ML; MG/100ML
30 INJECTION, SOLUTION INTRAVENOUS CONTINUOUS PRN
Status: DISCONTINUED | OUTPATIENT
Start: 2019-01-01 | End: 2019-01-01 | Stop reason: HOSPADM

## 2019-01-01 RX ORDER — FOLIC ACID 1 MG/1
1 TABLET ORAL DAILY
Start: 2019-01-01

## 2019-01-01 RX ADMIN — HYDROCODONE BITARTRATE AND ACETAMINOPHEN 1 TABLET: 5; 325 TABLET ORAL at 12:30

## 2019-01-01 RX ADMIN — TECHNETIUM TC 99M SESTAMIBI 1 DOSE: 1 INJECTION INTRAVENOUS at 08:00

## 2019-01-01 RX ADMIN — AMIODARONE HYDROCHLORIDE 200 MG: 200 TABLET ORAL at 08:08

## 2019-01-01 RX ADMIN — Medication 100 MG: at 08:19

## 2019-01-01 RX ADMIN — CEFTRIAXONE SODIUM 1 G: 1 INJECTION, SOLUTION INTRAVENOUS at 17:01

## 2019-01-01 RX ADMIN — Medication 100 MG: at 09:49

## 2019-01-01 RX ADMIN — MICONAZOLE NITRATE: 2 POWDER TOPICAL at 21:35

## 2019-01-01 RX ADMIN — TIMOLOL MALEATE 1 DROP: 5 SOLUTION OPHTHALMIC at 21:36

## 2019-01-01 RX ADMIN — LEVOTHYROXINE SODIUM 88 MCG: 88 TABLET ORAL at 10:17

## 2019-01-01 RX ADMIN — HYDROCODONE BITARTRATE AND ACETAMINOPHEN 1 TABLET: 5; 325 TABLET ORAL at 09:49

## 2019-01-01 RX ADMIN — SENNOSIDES AND DOCUSATE SODIUM 2 TABLET: 8.6; 5 TABLET ORAL at 21:27

## 2019-01-01 RX ADMIN — AMIODARONE HYDROCHLORIDE 200 MG: 200 TABLET ORAL at 11:17

## 2019-01-01 RX ADMIN — SODIUM CHLORIDE, PRESERVATIVE FREE 3 ML: 5 INJECTION INTRAVENOUS at 21:46

## 2019-01-01 RX ADMIN — HYDROCODONE BITARTRATE AND ACETAMINOPHEN 1 TABLET: 7.5; 325 TABLET ORAL at 16:26

## 2019-01-01 RX ADMIN — OLANZAPINE 2.5 MG: 2.5 TABLET, FILM COATED ORAL at 21:37

## 2019-01-01 RX ADMIN — TIMOLOL MALEATE 1 DROP: 5 SOLUTION OPHTHALMIC at 20:23

## 2019-01-01 RX ADMIN — FOLIC ACID 1 MG: 1 TABLET ORAL at 10:17

## 2019-01-01 RX ADMIN — Medication 100 MG: at 08:13

## 2019-01-01 RX ADMIN — DORZOLAMIDE HYDROCHLORIDE 1 DROP: 20 SOLUTION/ DROPS OPHTHALMIC at 09:45

## 2019-01-01 RX ADMIN — BACLOFEN 5 MG: 10 TABLET ORAL at 21:31

## 2019-01-01 RX ADMIN — SODIUM CHLORIDE, PRESERVATIVE FREE 3 ML: 5 INJECTION INTRAVENOUS at 21:16

## 2019-01-01 RX ADMIN — HYDROCODONE BITARTRATE AND ACETAMINOPHEN 1 TABLET: 5; 325 TABLET ORAL at 20:15

## 2019-01-01 RX ADMIN — SODIUM CHLORIDE, PRESERVATIVE FREE 10 ML: 5 INJECTION INTRAVENOUS at 23:59

## 2019-01-01 RX ADMIN — OLANZAPINE 2.5 MG: 2.5 TABLET, FILM COATED ORAL at 08:20

## 2019-01-01 RX ADMIN — ASPIRIN 325 MG: 325 TABLET ORAL at 10:14

## 2019-01-01 RX ADMIN — FINASTERIDE 5 MG: 5 TABLET, FILM COATED ORAL at 13:24

## 2019-01-01 RX ADMIN — HYDROCODONE BITARTRATE AND ACETAMINOPHEN 1 TABLET: 7.5; 325 TABLET ORAL at 10:19

## 2019-01-01 RX ADMIN — LEVOTHYROXINE SODIUM 88 MCG: 88 TABLET ORAL at 06:51

## 2019-01-01 RX ADMIN — FOLIC ACID 1 MG: 1 TABLET ORAL at 08:54

## 2019-01-01 RX ADMIN — MICONAZOLE NITRATE: 2 POWDER TOPICAL at 20:26

## 2019-01-01 RX ADMIN — MICONAZOLE NITRATE: 2 POWDER TOPICAL at 21:50

## 2019-01-01 RX ADMIN — CEFDINIR 300 MG: 300 CAPSULE ORAL at 09:20

## 2019-01-01 RX ADMIN — METOPROLOL TARTRATE 25 MG: 25 TABLET ORAL at 21:42

## 2019-01-01 RX ADMIN — LEVOTHYROXINE SODIUM 88 MCG: 88 TABLET ORAL at 08:09

## 2019-01-01 RX ADMIN — SENNOSIDES AND DOCUSATE SODIUM 2 TABLET: 8.6; 5 TABLET ORAL at 20:43

## 2019-01-01 RX ADMIN — MICONAZOLE NITRATE: 2 POWDER TOPICAL at 13:24

## 2019-01-01 RX ADMIN — METOPROLOL TARTRATE 25 MG: 25 TABLET ORAL at 11:37

## 2019-01-01 RX ADMIN — OLANZAPINE 5 MG: 5 TABLET, FILM COATED ORAL at 17:35

## 2019-01-01 RX ADMIN — HYDROCODONE BITARTRATE AND ACETAMINOPHEN 1 TABLET: 5; 325 TABLET ORAL at 01:48

## 2019-01-01 RX ADMIN — DULOXETINE HYDROCHLORIDE 120 MG: 60 CAPSULE, DELAYED RELEASE ORAL at 21:11

## 2019-01-01 RX ADMIN — AMIODARONE HYDROCHLORIDE 200 MG: 200 TABLET ORAL at 09:46

## 2019-01-01 RX ADMIN — LEVOTHYROXINE SODIUM 88 MCG: 88 TABLET ORAL at 06:39

## 2019-01-01 RX ADMIN — PHENYTOIN SODIUM 500 MG: 100 CAPSULE, EXTENDED RELEASE ORAL at 20:23

## 2019-01-01 RX ADMIN — PHENYTOIN SODIUM 500 MG: 100 CAPSULE, EXTENDED RELEASE ORAL at 21:18

## 2019-01-01 RX ADMIN — DORZOLAMIDE HYDROCHLORIDE 1 DROP: 20 SOLUTION/ DROPS OPHTHALMIC at 20:23

## 2019-01-01 RX ADMIN — HYDROCODONE BITARTRATE AND ACETAMINOPHEN 1 TABLET: 5; 325 TABLET ORAL at 11:30

## 2019-01-01 RX ADMIN — LEVOTHYROXINE SODIUM 88 MCG: 88 TABLET ORAL at 09:01

## 2019-01-01 RX ADMIN — CEFDINIR 300 MG: 300 CAPSULE ORAL at 20:15

## 2019-01-01 RX ADMIN — ASPIRIN 325 MG: 325 TABLET ORAL at 08:50

## 2019-01-01 RX ADMIN — FINASTERIDE 5 MG: 5 TABLET, FILM COATED ORAL at 09:03

## 2019-01-01 RX ADMIN — PHENYTOIN SODIUM 500 MG: 100 CAPSULE, EXTENDED RELEASE ORAL at 20:43

## 2019-01-01 RX ADMIN — CEFTRIAXONE SODIUM 1 G: 1 INJECTION, SOLUTION INTRAVENOUS at 18:14

## 2019-01-01 RX ADMIN — ACETAMINOPHEN 650 MG: 325 TABLET, FILM COATED ORAL at 04:20

## 2019-01-01 RX ADMIN — CEFTRIAXONE SODIUM 1 G: 1 INJECTION, SOLUTION INTRAVENOUS at 16:07

## 2019-01-01 RX ADMIN — MICONAZOLE NITRATE: 2 POWDER TOPICAL at 09:02

## 2019-01-01 RX ADMIN — POLYETHYLENE GLYCOL 3350 119 G: 17 POWDER, FOR SOLUTION ORAL at 11:53

## 2019-01-01 RX ADMIN — FOLIC ACID 1 MG: 1 TABLET ORAL at 09:20

## 2019-01-01 RX ADMIN — FINASTERIDE 5 MG: 5 TABLET, FILM COATED ORAL at 10:16

## 2019-01-01 RX ADMIN — ONDANSETRON 4 MG: 2 INJECTION INTRAMUSCULAR; INTRAVENOUS at 12:20

## 2019-01-01 RX ADMIN — METOPROLOL TARTRATE 25 MG: 25 TABLET ORAL at 21:31

## 2019-01-01 RX ADMIN — SODIUM CHLORIDE 100 ML/HR: 9 INJECTION, SOLUTION INTRAVENOUS at 07:07

## 2019-01-01 RX ADMIN — FOLIC ACID 1 MG: 1 TABLET ORAL at 08:52

## 2019-01-01 RX ADMIN — IOPAMIDOL 100 ML: 612 INJECTION, SOLUTION INTRAVENOUS at 21:54

## 2019-01-01 RX ADMIN — MICONAZOLE NITRATE: 2 POWDER TOPICAL at 08:16

## 2019-01-01 RX ADMIN — HYDROCODONE BITARTRATE AND ACETAMINOPHEN 1 TABLET: 7.5; 325 TABLET ORAL at 05:03

## 2019-01-01 RX ADMIN — ASPIRIN 325 MG: 325 TABLET ORAL at 09:01

## 2019-01-01 RX ADMIN — SODIUM CHLORIDE, POTASSIUM CHLORIDE, SODIUM LACTATE AND CALCIUM CHLORIDE: 600; 310; 30; 20 INJECTION, SOLUTION INTRAVENOUS at 10:50

## 2019-01-01 RX ADMIN — SODIUM CHLORIDE, PRESERVATIVE FREE 3 ML: 5 INJECTION INTRAVENOUS at 08:35

## 2019-01-01 RX ADMIN — SODIUM CHLORIDE, PRESERVATIVE FREE 3 ML: 5 INJECTION INTRAVENOUS at 09:00

## 2019-01-01 RX ADMIN — POLYETHYLENE GLYCOL 3350 17 G: 17 POWDER, FOR SOLUTION ORAL at 11:17

## 2019-01-01 RX ADMIN — LEVOTHYROXINE SODIUM 88 MCG: 88 TABLET ORAL at 08:32

## 2019-01-01 RX ADMIN — FINASTERIDE 5 MG: 5 TABLET, FILM COATED ORAL at 08:52

## 2019-01-01 RX ADMIN — TAMSULOSIN HYDROCHLORIDE 0.4 MG: 0.4 CAPSULE ORAL at 08:18

## 2019-01-01 RX ADMIN — CHLORPROMAZINE HYDROCHLORIDE 50 MG: 25 INJECTION INTRAMUSCULAR at 17:21

## 2019-01-01 RX ADMIN — BACLOFEN 5 MG: 10 TABLET ORAL at 12:05

## 2019-01-01 RX ADMIN — METOPROLOL TARTRATE 25 MG: 25 TABLET ORAL at 08:55

## 2019-01-01 RX ADMIN — HYDROCODONE BITARTRATE AND ACETAMINOPHEN 1 TABLET: 7.5; 325 TABLET ORAL at 23:11

## 2019-01-01 RX ADMIN — ENOXAPARIN SODIUM 40 MG: 40 INJECTION SUBCUTANEOUS at 23:07

## 2019-01-01 RX ADMIN — BACLOFEN 5 MG: 10 TABLET ORAL at 08:55

## 2019-01-01 RX ADMIN — SODIUM CHLORIDE, PRESERVATIVE FREE 10 ML: 5 INJECTION INTRAVENOUS at 08:19

## 2019-01-01 RX ADMIN — TAMSULOSIN HYDROCHLORIDE 0.4 MG: 0.4 CAPSULE ORAL at 13:24

## 2019-01-01 RX ADMIN — MICONAZOLE NITRATE: 2 POWDER TOPICAL at 10:18

## 2019-01-01 RX ADMIN — FOLIC ACID 1 MG: 1 TABLET ORAL at 08:58

## 2019-01-01 RX ADMIN — MICONAZOLE NITRATE: 2 POWDER TOPICAL at 22:40

## 2019-01-01 RX ADMIN — PHENYTOIN SODIUM 500 MG: 100 CAPSULE, EXTENDED RELEASE ORAL at 21:30

## 2019-01-01 RX ADMIN — HYDROCODONE BITARTRATE AND ACETAMINOPHEN 1 TABLET: 7.5; 325 TABLET ORAL at 08:22

## 2019-01-01 RX ADMIN — FINASTERIDE 5 MG: 5 TABLET, FILM COATED ORAL at 09:14

## 2019-01-01 RX ADMIN — CEFDINIR 300 MG: 300 CAPSULE ORAL at 08:34

## 2019-01-01 RX ADMIN — MICONAZOLE NITRATE: 2 POWDER TOPICAL at 20:43

## 2019-01-01 RX ADMIN — TAMSULOSIN HYDROCHLORIDE 0.4 MG: 0.4 CAPSULE ORAL at 08:58

## 2019-01-01 RX ADMIN — METOPROLOL TARTRATE 25 MG: 25 TABLET ORAL at 21:35

## 2019-01-01 RX ADMIN — SODIUM CHLORIDE, PRESERVATIVE FREE 3 ML: 5 INJECTION INTRAVENOUS at 09:15

## 2019-01-01 RX ADMIN — PHENYTOIN SODIUM 500 MG: 100 CAPSULE, EXTENDED RELEASE ORAL at 20:32

## 2019-01-01 RX ADMIN — SODIUM CHLORIDE, PRESERVATIVE FREE 10 ML: 5 INJECTION INTRAVENOUS at 08:17

## 2019-01-01 RX ADMIN — Medication 100 MG: at 08:50

## 2019-01-01 RX ADMIN — ASPIRIN 325 MG: 325 TABLET ORAL at 09:45

## 2019-01-01 RX ADMIN — CHLORPROMAZINE HYDROCHLORIDE 50 MG: 25 INJECTION INTRAMUSCULAR at 07:41

## 2019-01-01 RX ADMIN — DORZOLAMIDE HYDROCHLORIDE 1 DROP: 20 SOLUTION/ DROPS OPHTHALMIC at 08:35

## 2019-01-01 RX ADMIN — PHENYTOIN SODIUM 500 MG: 100 CAPSULE, EXTENDED RELEASE ORAL at 01:10

## 2019-01-01 RX ADMIN — PHENYTOIN SODIUM 500 MG: 100 CAPSULE, EXTENDED RELEASE ORAL at 21:57

## 2019-01-01 RX ADMIN — METOPROLOL TARTRATE 12.5 MG: 25 TABLET ORAL at 21:27

## 2019-01-01 RX ADMIN — PHENYTOIN SODIUM 500 MG: 100 CAPSULE, EXTENDED RELEASE ORAL at 21:56

## 2019-01-01 RX ADMIN — MICONAZOLE NITRATE: 2 POWDER TOPICAL at 21:57

## 2019-01-01 RX ADMIN — OLANZAPINE 2.5 MG: 2.5 TABLET, FILM COATED ORAL at 20:24

## 2019-01-01 RX ADMIN — MICONAZOLE NITRATE: 2 POWDER TOPICAL at 12:06

## 2019-01-01 RX ADMIN — SENNOSIDES AND DOCUSATE SODIUM 2 TABLET: 8.6; 5 TABLET ORAL at 20:23

## 2019-01-01 RX ADMIN — MICONAZOLE NITRATE: 2 POWDER TOPICAL at 21:31

## 2019-01-01 RX ADMIN — Medication 100 MG: at 08:55

## 2019-01-01 RX ADMIN — ENOXAPARIN SODIUM 40 MG: 40 INJECTION SUBCUTANEOUS at 12:05

## 2019-01-01 RX ADMIN — Medication 100 MG: at 09:14

## 2019-01-01 RX ADMIN — PHENYTOIN SODIUM 500 MG: 100 CAPSULE, EXTENDED RELEASE ORAL at 20:11

## 2019-01-01 RX ADMIN — METOPROLOL TARTRATE 25 MG: 25 TABLET ORAL at 20:23

## 2019-01-01 RX ADMIN — HYDROCODONE BITARTRATE AND ACETAMINOPHEN 1 TABLET: 7.5; 325 TABLET ORAL at 22:00

## 2019-01-01 RX ADMIN — FINASTERIDE 5 MG: 5 TABLET, FILM COATED ORAL at 08:01

## 2019-01-01 RX ADMIN — METOPROLOL TARTRATE 25 MG: 25 TABLET ORAL at 08:18

## 2019-01-01 RX ADMIN — MICONAZOLE NITRATE 1 APPLICATION: 2 POWDER TOPICAL at 21:39

## 2019-01-01 RX ADMIN — TIMOLOL MALEATE 1 DROP: 5 SOLUTION OPHTHALMIC at 10:20

## 2019-01-01 RX ADMIN — MICONAZOLE NITRATE: 2 POWDER TOPICAL at 08:35

## 2019-01-01 RX ADMIN — REGADENOSON 0.4 MG: 0.08 INJECTION, SOLUTION INTRAVENOUS at 11:16

## 2019-01-01 RX ADMIN — Medication 100 MG: at 08:01

## 2019-01-01 RX ADMIN — MICONAZOLE NITRATE: 2 POWDER TOPICAL at 08:53

## 2019-01-01 RX ADMIN — ASPIRIN 325 MG: 325 TABLET ORAL at 08:20

## 2019-01-01 RX ADMIN — Medication 100 MG: at 10:19

## 2019-01-01 RX ADMIN — DULOXETINE HYDROCHLORIDE 120 MG: 60 CAPSULE, DELAYED RELEASE ORAL at 20:43

## 2019-01-01 RX ADMIN — ENOXAPARIN SODIUM 40 MG: 40 INJECTION SUBCUTANEOUS at 10:28

## 2019-01-01 RX ADMIN — HYDROCODONE BITARTRATE AND ACETAMINOPHEN 1 TABLET: 5; 325 TABLET ORAL at 20:50

## 2019-01-01 RX ADMIN — SODIUM CHLORIDE 100 ML/HR: 9 INJECTION, SOLUTION INTRAVENOUS at 21:12

## 2019-01-01 RX ADMIN — SODIUM CHLORIDE, PRESERVATIVE FREE 3 ML: 5 INJECTION INTRAVENOUS at 09:24

## 2019-01-01 RX ADMIN — HYDROCODONE BITARTRATE AND ACETAMINOPHEN 1 TABLET: 7.5; 325 TABLET ORAL at 00:06

## 2019-01-01 RX ADMIN — METOPROLOL TARTRATE 25 MG: 25 TABLET ORAL at 20:24

## 2019-01-01 RX ADMIN — HYDROCODONE BITARTRATE AND ACETAMINOPHEN 1 TABLET: 7.5; 325 TABLET ORAL at 14:53

## 2019-01-01 RX ADMIN — Medication 100 MG: at 09:01

## 2019-01-01 RX ADMIN — HYDROCODONE BITARTRATE AND ACETAMINOPHEN 1 TABLET: 7.5; 325 TABLET ORAL at 13:24

## 2019-01-01 RX ADMIN — METOPROLOL TARTRATE 12.5 MG: 25 TABLET ORAL at 21:56

## 2019-01-01 RX ADMIN — FOLIC ACID 1 MG: 1 TABLET ORAL at 08:50

## 2019-01-01 RX ADMIN — CEFDINIR 300 MG: 300 CAPSULE ORAL at 20:49

## 2019-01-01 RX ADMIN — MICONAZOLE NITRATE: 2 POWDER TOPICAL at 08:24

## 2019-01-01 RX ADMIN — ACETAMINOPHEN 650 MG: 325 TABLET, FILM COATED ORAL at 20:13

## 2019-01-01 RX ADMIN — OLANZAPINE 2.5 MG: 2.5 TABLET, FILM COATED ORAL at 08:50

## 2019-01-01 RX ADMIN — ZIPRASIDONE HYDROCHLORIDE 20 MG: 20 CAPSULE ORAL at 17:58

## 2019-01-01 RX ADMIN — METOPROLOL TARTRATE 12.5 MG: 25 TABLET ORAL at 08:58

## 2019-01-01 RX ADMIN — TAMSULOSIN HYDROCHLORIDE 0.4 MG: 0.4 CAPSULE ORAL at 08:08

## 2019-01-01 RX ADMIN — HYDROCODONE BITARTRATE AND ACETAMINOPHEN 1 TABLET: 5; 325 TABLET ORAL at 01:18

## 2019-01-01 RX ADMIN — CEFDINIR 300 MG: 300 CAPSULE ORAL at 16:14

## 2019-01-01 RX ADMIN — ENOXAPARIN SODIUM 40 MG: 40 INJECTION SUBCUTANEOUS at 08:55

## 2019-01-01 RX ADMIN — HYDROCODONE BITARTRATE AND ACETAMINOPHEN 1 TABLET: 7.5; 325 TABLET ORAL at 03:45

## 2019-01-01 RX ADMIN — HYDROCODONE BITARTRATE AND ACETAMINOPHEN 1 TABLET: 5; 325 TABLET ORAL at 16:05

## 2019-01-01 RX ADMIN — SODIUM CHLORIDE, PRESERVATIVE FREE 10 ML: 5 INJECTION INTRAVENOUS at 21:19

## 2019-01-01 RX ADMIN — PETROLATUM: 42 OINTMENT TOPICAL at 09:06

## 2019-01-01 RX ADMIN — DULOXETINE HYDROCHLORIDE 120 MG: 60 CAPSULE, DELAYED RELEASE ORAL at 20:07

## 2019-01-01 RX ADMIN — PHENYTOIN SODIUM 500 MG: 100 CAPSULE, EXTENDED RELEASE ORAL at 21:15

## 2019-01-01 RX ADMIN — SODIUM CHLORIDE, PRESERVATIVE FREE 10 ML: 5 INJECTION INTRAVENOUS at 08:33

## 2019-01-01 RX ADMIN — ENOXAPARIN SODIUM 40 MG: 40 INJECTION SUBCUTANEOUS at 23:19

## 2019-01-01 RX ADMIN — HYDROCODONE BITARTRATE AND ACETAMINOPHEN 1 TABLET: 5; 325 TABLET ORAL at 21:53

## 2019-01-01 RX ADMIN — ASPIRIN 325 MG: 325 TABLET ORAL at 13:28

## 2019-01-01 RX ADMIN — TAMSULOSIN HYDROCHLORIDE 0.4 MG: 0.4 CAPSULE ORAL at 12:05

## 2019-01-01 RX ADMIN — SODIUM CHLORIDE, PRESERVATIVE FREE 10 ML: 5 INJECTION INTRAVENOUS at 20:56

## 2019-01-01 RX ADMIN — DORZOLAMIDE HYDROCHLORIDE 1 DROP: 20 SOLUTION/ DROPS OPHTHALMIC at 20:24

## 2019-01-01 RX ADMIN — METOPROLOL TARTRATE 25 MG: 25 TABLET ORAL at 08:29

## 2019-01-01 RX ADMIN — FOLIC ACID 1 MG: 1 TABLET ORAL at 08:32

## 2019-01-01 RX ADMIN — HYDROCODONE BITARTRATE AND ACETAMINOPHEN 1 TABLET: 7.5; 325 TABLET ORAL at 12:07

## 2019-01-01 RX ADMIN — MICONAZOLE NITRATE: 2 POWDER TOPICAL at 22:22

## 2019-01-01 RX ADMIN — LEVOTHYROXINE SODIUM 88 MCG: 88 TABLET ORAL at 08:58

## 2019-01-01 RX ADMIN — SENNOSIDES AND DOCUSATE SODIUM 2 TABLET: 8.6; 5 TABLET ORAL at 21:42

## 2019-01-01 RX ADMIN — SODIUM CHLORIDE, PRESERVATIVE FREE 10 ML: 5 INJECTION INTRAVENOUS at 08:37

## 2019-01-01 RX ADMIN — ACETAMINOPHEN 650 MG: 325 TABLET, FILM COATED ORAL at 11:08

## 2019-01-01 RX ADMIN — POLYETHYLENE GLYCOL 3350 119 G: 17 POWDER, FOR SOLUTION ORAL at 18:10

## 2019-01-01 RX ADMIN — HYDROCODONE BITARTRATE AND ACETAMINOPHEN 1 TABLET: 7.5; 325 TABLET ORAL at 10:06

## 2019-01-01 RX ADMIN — LEVOTHYROXINE SODIUM 88 MCG: 88 TABLET ORAL at 08:13

## 2019-01-01 RX ADMIN — SODIUM CHLORIDE, PRESERVATIVE FREE 3 ML: 5 INJECTION INTRAVENOUS at 23:20

## 2019-01-01 RX ADMIN — PETROLATUM: 42 OINTMENT TOPICAL at 20:19

## 2019-01-01 RX ADMIN — FOLIC ACID 1 MG: 1 TABLET ORAL at 08:19

## 2019-01-01 RX ADMIN — FOLIC ACID 1 MG: 1 TABLET ORAL at 08:34

## 2019-01-01 RX ADMIN — LEVOTHYROXINE SODIUM 88 MCG: 88 TABLET ORAL at 05:40

## 2019-01-01 RX ADMIN — ASPIRIN 650 MG: 325 TABLET ORAL at 08:34

## 2019-01-01 RX ADMIN — FINASTERIDE 5 MG: 5 TABLET, FILM COATED ORAL at 09:47

## 2019-01-01 RX ADMIN — SODIUM CHLORIDE, PRESERVATIVE FREE 3 ML: 5 INJECTION INTRAVENOUS at 08:00

## 2019-01-01 RX ADMIN — SODIUM CHLORIDE 100 ML/HR: 9 INJECTION, SOLUTION INTRAVENOUS at 12:33

## 2019-01-01 RX ADMIN — OXYCODONE AND ACETAMINOPHEN 2 TABLET: 5; 325 TABLET ORAL at 21:35

## 2019-01-01 RX ADMIN — Medication 100 MG: at 08:32

## 2019-01-01 RX ADMIN — ASPIRIN 650 MG: 325 TABLET ORAL at 09:20

## 2019-01-01 RX ADMIN — HYDROCODONE BITARTRATE AND ACETAMINOPHEN 1 TABLET: 7.5; 325 TABLET ORAL at 13:08

## 2019-01-01 RX ADMIN — FINASTERIDE 5 MG: 5 TABLET, FILM COATED ORAL at 09:37

## 2019-01-01 RX ADMIN — PHENYTOIN SODIUM 500 MG: 100 CAPSULE, EXTENDED RELEASE ORAL at 08:50

## 2019-01-01 RX ADMIN — LEVOTHYROXINE SODIUM 88 MCG: 88 TABLET ORAL at 05:27

## 2019-01-01 RX ADMIN — PHENYTOIN SODIUM 500 MG: 100 CAPSULE, EXTENDED RELEASE ORAL at 21:35

## 2019-01-01 RX ADMIN — ASPIRIN 650 MG: 325 TABLET ORAL at 08:01

## 2019-01-01 RX ADMIN — SODIUM CHLORIDE, PRESERVATIVE FREE 3 ML: 5 INJECTION INTRAVENOUS at 09:04

## 2019-01-01 RX ADMIN — HYDROCODONE BITARTRATE AND ACETAMINOPHEN 1 TABLET: 7.5; 325 TABLET ORAL at 00:23

## 2019-01-01 RX ADMIN — ENOXAPARIN SODIUM 40 MG: 40 INJECTION SUBCUTANEOUS at 08:21

## 2019-01-01 RX ADMIN — FINASTERIDE 5 MG: 5 TABLET, FILM COATED ORAL at 08:58

## 2019-01-01 RX ADMIN — PETROLATUM 1 APPLICATION: 42 OINTMENT TOPICAL at 08:36

## 2019-01-01 RX ADMIN — CEFTRIAXONE SODIUM 1 G: 1 INJECTION, SOLUTION INTRAVENOUS at 17:34

## 2019-01-01 RX ADMIN — HYDROCODONE BITARTRATE AND ACETAMINOPHEN 1 TABLET: 7.5; 325 TABLET ORAL at 04:56

## 2019-01-01 RX ADMIN — FOLIC ACID 1 MG: 1 TABLET ORAL at 09:45

## 2019-01-01 RX ADMIN — ENOXAPARIN SODIUM 40 MG: 40 INJECTION SUBCUTANEOUS at 10:15

## 2019-01-01 RX ADMIN — DULOXETINE HYDROCHLORIDE 120 MG: 60 CAPSULE, DELAYED RELEASE ORAL at 20:55

## 2019-01-01 RX ADMIN — CEFTRIAXONE SODIUM 1 G: 1 INJECTION, SOLUTION INTRAVENOUS at 15:12

## 2019-01-01 RX ADMIN — OLANZAPINE 5 MG: 5 TABLET, FILM COATED ORAL at 23:15

## 2019-01-01 RX ADMIN — OLANZAPINE 2.5 MG: 2.5 TABLET, FILM COATED ORAL at 08:54

## 2019-01-01 RX ADMIN — LEVOTHYROXINE SODIUM 88 MCG: 88 TABLET ORAL at 12:06

## 2019-01-01 RX ADMIN — LEVOTHYROXINE SODIUM 88 MCG: 88 TABLET ORAL at 08:55

## 2019-01-01 RX ADMIN — SODIUM CHLORIDE, PRESERVATIVE FREE 10 ML: 5 INJECTION INTRAVENOUS at 10:06

## 2019-01-01 RX ADMIN — ASPIRIN 325 MG: 325 TABLET ORAL at 08:32

## 2019-01-01 RX ADMIN — Medication 100 MG: at 13:24

## 2019-01-01 RX ADMIN — HYDROCODONE BITARTRATE AND ACETAMINOPHEN 1 TABLET: 7.5; 325 TABLET ORAL at 03:27

## 2019-01-01 RX ADMIN — ASPIRIN 650 MG: 325 TABLET ORAL at 21:11

## 2019-01-01 RX ADMIN — HYDROCODONE BITARTRATE AND ACETAMINOPHEN 1 TABLET: 7.5; 325 TABLET ORAL at 18:46

## 2019-01-01 RX ADMIN — Medication 100 MG: at 08:09

## 2019-01-01 RX ADMIN — TECHNETIUM TC 99M SESTAMIBI 1 DOSE: 1 INJECTION INTRAVENOUS at 08:45

## 2019-01-01 RX ADMIN — SODIUM CHLORIDE, PRESERVATIVE FREE 10 ML: 5 INJECTION INTRAVENOUS at 20:44

## 2019-01-01 RX ADMIN — FINASTERIDE 5 MG: 5 TABLET, FILM COATED ORAL at 22:55

## 2019-01-01 RX ADMIN — SODIUM CHLORIDE, PRESERVATIVE FREE 10 ML: 5 INJECTION INTRAVENOUS at 21:34

## 2019-01-01 RX ADMIN — SODIUM CHLORIDE, PRESERVATIVE FREE 10 ML: 5 INJECTION INTRAVENOUS at 10:19

## 2019-01-01 RX ADMIN — PETROLATUM: 42 OINTMENT TOPICAL at 20:43

## 2019-01-01 RX ADMIN — POLYETHYLENE GLYCOL 3350 17 G: 17 POWDER, FOR SOLUTION ORAL at 11:01

## 2019-01-01 RX ADMIN — HALOPERIDOL LACTATE 2 MG: 5 INJECTION, SOLUTION INTRAMUSCULAR at 19:18

## 2019-01-01 RX ADMIN — SODIUM CHLORIDE, PRESERVATIVE FREE 10 ML: 5 INJECTION INTRAVENOUS at 21:37

## 2019-01-01 RX ADMIN — ENOXAPARIN SODIUM 40 MG: 40 INJECTION SUBCUTANEOUS at 23:25

## 2019-01-01 RX ADMIN — TIMOLOL MALEATE 1 DROP: 5 SOLUTION OPHTHALMIC at 08:20

## 2019-01-01 RX ADMIN — SODIUM CHLORIDE, PRESERVATIVE FREE 10 ML: 5 INJECTION INTRAVENOUS at 21:32

## 2019-01-01 RX ADMIN — POLYETHYLENE GLYCOL 3350 17 G: 17 POWDER, FOR SOLUTION ORAL at 10:18

## 2019-01-01 RX ADMIN — MICONAZOLE NITRATE: 2 POWDER TOPICAL at 08:09

## 2019-01-01 RX ADMIN — SENNOSIDES AND DOCUSATE SODIUM 2 TABLET: 8.6; 5 TABLET ORAL at 21:13

## 2019-01-01 RX ADMIN — THIAMINE HYDROCHLORIDE 100 MG: 100 INJECTION, SOLUTION INTRAMUSCULAR; INTRAVENOUS at 08:42

## 2019-01-01 RX ADMIN — HYDROCODONE BITARTRATE AND ACETAMINOPHEN 1 TABLET: 7.5; 325 TABLET ORAL at 01:12

## 2019-01-01 RX ADMIN — SODIUM CHLORIDE, PRESERVATIVE FREE 10 ML: 5 INJECTION INTRAVENOUS at 09:48

## 2019-01-01 RX ADMIN — SODIUM CHLORIDE 100 ML/HR: 9 INJECTION, SOLUTION INTRAVENOUS at 09:07

## 2019-01-01 RX ADMIN — TAMSULOSIN HYDROCHLORIDE 0.4 MG: 0.4 CAPSULE ORAL at 08:54

## 2019-01-01 RX ADMIN — SODIUM CHLORIDE, PRESERVATIVE FREE 3 ML: 5 INJECTION INTRAVENOUS at 08:53

## 2019-01-01 RX ADMIN — TIMOLOL MALEATE 1 DROP: 5 SOLUTION OPHTHALMIC at 21:58

## 2019-01-01 RX ADMIN — Medication 100 MG: at 09:45

## 2019-01-01 RX ADMIN — BISACODYL 10 MG: 10 SUPPOSITORY RECTAL at 11:30

## 2019-01-01 RX ADMIN — OLANZAPINE 10 MG: 10 INJECTION, POWDER, FOR SOLUTION INTRAMUSCULAR at 21:09

## 2019-01-01 RX ADMIN — LEVOTHYROXINE SODIUM 88 MCG: 88 TABLET ORAL at 13:22

## 2019-01-01 RX ADMIN — OLANZAPINE 2.5 MG: 2.5 TABLET, FILM COATED ORAL at 11:38

## 2019-01-01 RX ADMIN — ENOXAPARIN SODIUM 40 MG: 40 INJECTION SUBCUTANEOUS at 11:17

## 2019-01-01 RX ADMIN — BACLOFEN 5 MG: 10 TABLET ORAL at 21:36

## 2019-01-01 RX ADMIN — HYDROCODONE BITARTRATE AND ACETAMINOPHEN 1 TABLET: 7.5; 325 TABLET ORAL at 21:29

## 2019-01-01 RX ADMIN — LEVOTHYROXINE SODIUM 88 MCG: 88 TABLET ORAL at 09:45

## 2019-01-01 RX ADMIN — TIMOLOL MALEATE 1 DROP: 5 SOLUTION OPHTHALMIC at 21:34

## 2019-01-01 RX ADMIN — FOLIC ACID 1 MG: 1 TABLET ORAL at 08:08

## 2019-01-01 RX ADMIN — FINASTERIDE 5 MG: 5 TABLET, FILM COATED ORAL at 08:18

## 2019-01-01 RX ADMIN — FINASTERIDE 5 MG: 5 TABLET, FILM COATED ORAL at 09:44

## 2019-01-01 RX ADMIN — FINASTERIDE 5 MG: 5 TABLET, FILM COATED ORAL at 08:20

## 2019-01-01 RX ADMIN — DULOXETINE HYDROCHLORIDE 120 MG: 60 CAPSULE, DELAYED RELEASE ORAL at 21:15

## 2019-01-01 RX ADMIN — METOPROLOL TARTRATE 25 MG: 25 TABLET ORAL at 08:20

## 2019-01-01 RX ADMIN — HYDROCODONE BITARTRATE AND ACETAMINOPHEN 1 TABLET: 5; 325 TABLET ORAL at 16:14

## 2019-01-01 RX ADMIN — PHENYTOIN SODIUM 500 MG: 100 CAPSULE, EXTENDED RELEASE ORAL at 22:22

## 2019-01-01 RX ADMIN — SODIUM CHLORIDE, PRESERVATIVE FREE 3 ML: 5 INJECTION INTRAVENOUS at 08:51

## 2019-01-01 RX ADMIN — BACLOFEN 5 MG: 10 TABLET ORAL at 00:43

## 2019-01-01 RX ADMIN — METOPROLOL TARTRATE 25 MG: 25 TABLET ORAL at 21:37

## 2019-01-01 RX ADMIN — SODIUM CHLORIDE, PRESERVATIVE FREE 10 ML: 5 INJECTION INTRAVENOUS at 22:22

## 2019-01-01 RX ADMIN — HYDROCODONE BITARTRATE AND ACETAMINOPHEN 1 TABLET: 7.5; 325 TABLET ORAL at 01:46

## 2019-01-01 RX ADMIN — SODIUM CHLORIDE, PRESERVATIVE FREE 10 ML: 5 INJECTION INTRAVENOUS at 21:43

## 2019-01-01 RX ADMIN — FINASTERIDE 5 MG: 5 TABLET, FILM COATED ORAL at 08:55

## 2019-01-01 RX ADMIN — MICONAZOLE NITRATE: 2 POWDER TOPICAL at 09:45

## 2019-01-01 RX ADMIN — LIDOCAINE HYDROCHLORIDE 20 ML: 10 INJECTION, SOLUTION INFILTRATION; PERINEURAL at 15:30

## 2019-01-01 RX ADMIN — METOPROLOL TARTRATE 25 MG: 25 TABLET ORAL at 08:50

## 2019-01-01 RX ADMIN — LEVOTHYROXINE SODIUM 88 MCG: 88 TABLET ORAL at 09:47

## 2019-01-01 RX ADMIN — OLANZAPINE 5 MG: 5 TABLET, FILM COATED ORAL at 21:35

## 2019-01-01 RX ADMIN — SODIUM CHLORIDE, PRESERVATIVE FREE 3 ML: 5 INJECTION INTRAVENOUS at 09:07

## 2019-01-01 RX ADMIN — POLYETHYLENE GLYCOL 3350 17 G: 17 POWDER, FOR SOLUTION ORAL at 08:34

## 2019-01-01 RX ADMIN — HYDROCODONE BITARTRATE AND ACETAMINOPHEN 1 TABLET: 5; 325 TABLET ORAL at 23:00

## 2019-01-01 RX ADMIN — FINASTERIDE 5 MG: 5 TABLET, FILM COATED ORAL at 08:51

## 2019-01-01 RX ADMIN — FOLIC ACID 1 MG: 1 TABLET ORAL at 11:18

## 2019-01-01 RX ADMIN — CEFTRIAXONE SODIUM 1 G: 1 INJECTION, SOLUTION INTRAVENOUS at 14:14

## 2019-01-01 RX ADMIN — PETROLATUM: 42 OINTMENT TOPICAL at 08:50

## 2019-01-01 RX ADMIN — SODIUM CHLORIDE, PRESERVATIVE FREE 10 ML: 5 INJECTION INTRAVENOUS at 09:45

## 2019-01-01 RX ADMIN — HYDROCODONE BITARTRATE AND ACETAMINOPHEN 1 TABLET: 5; 325 TABLET ORAL at 06:38

## 2019-01-01 RX ADMIN — SODIUM CHLORIDE, PRESERVATIVE FREE 10 ML: 5 INJECTION INTRAVENOUS at 20:24

## 2019-01-01 RX ADMIN — DORZOLAMIDE HYDROCHLORIDE 1 DROP: 20 SOLUTION/ DROPS OPHTHALMIC at 08:51

## 2019-01-01 RX ADMIN — SODIUM CHLORIDE 500 ML: 9 INJECTION, SOLUTION INTRAVENOUS at 15:50

## 2019-01-01 RX ADMIN — PHENYTOIN SODIUM 500 MG: 100 CAPSULE, EXTENDED RELEASE ORAL at 20:07

## 2019-01-01 RX ADMIN — HYDROCODONE BITARTRATE AND ACETAMINOPHEN 1 TABLET: 7.5; 325 TABLET ORAL at 09:49

## 2019-01-01 RX ADMIN — SENNOSIDES AND DOCUSATE SODIUM 2 TABLET: 8.6; 5 TABLET ORAL at 21:35

## 2019-01-01 RX ADMIN — MICONAZOLE NITRATE: 2 POWDER TOPICAL at 11:49

## 2019-01-01 RX ADMIN — HYDROCODONE BITARTRATE AND ACETAMINOPHEN 1 TABLET: 5; 325 TABLET ORAL at 06:51

## 2019-01-01 RX ADMIN — OLANZAPINE 5 MG: 5 TABLET, FILM COATED ORAL at 10:20

## 2019-01-01 RX ADMIN — MICONAZOLE NITRATE: 2 POWDER TOPICAL at 23:58

## 2019-01-01 RX ADMIN — ASPIRIN 325 MG: 325 TABLET ORAL at 08:13

## 2019-01-01 RX ADMIN — HYDROCODONE BITARTRATE AND ACETAMINOPHEN 1 TABLET: 5; 325 TABLET ORAL at 21:11

## 2019-01-01 RX ADMIN — HALOPERIDOL LACTATE 2 MG: 5 INJECTION, SOLUTION INTRAMUSCULAR at 08:04

## 2019-01-01 RX ADMIN — FINASTERIDE 5 MG: 5 TABLET, FILM COATED ORAL at 09:20

## 2019-01-01 RX ADMIN — ACETAMINOPHEN 650 MG: 325 TABLET, FILM COATED ORAL at 09:45

## 2019-01-01 RX ADMIN — TAMSULOSIN HYDROCHLORIDE 0.4 MG: 0.4 CAPSULE ORAL at 09:01

## 2019-01-01 RX ADMIN — LEVOTHYROXINE SODIUM 88 MCG: 88 TABLET ORAL at 06:30

## 2019-01-01 RX ADMIN — ENOXAPARIN SODIUM 40 MG: 40 INJECTION SUBCUTANEOUS at 01:31

## 2019-01-01 RX ADMIN — Medication 100 MG: at 09:46

## 2019-01-01 RX ADMIN — SODIUM CHLORIDE 125 ML/HR: 9 INJECTION, SOLUTION INTRAVENOUS at 20:05

## 2019-01-01 RX ADMIN — OLANZAPINE 2.5 MG: 2.5 TABLET, FILM COATED ORAL at 21:42

## 2019-01-01 RX ADMIN — LORAZEPAM 2 MG: 2 INJECTION INTRAMUSCULAR; INTRAVENOUS at 22:19

## 2019-01-01 RX ADMIN — DORZOLAMIDE HYDROCHLORIDE 1 DROP: 20 SOLUTION/ DROPS OPHTHALMIC at 08:19

## 2019-01-01 RX ADMIN — LEVOTHYROXINE SODIUM 88 MCG: 88 TABLET ORAL at 08:50

## 2019-01-01 RX ADMIN — SENNOSIDES AND DOCUSATE SODIUM 2 TABLET: 8.6; 5 TABLET ORAL at 21:50

## 2019-01-01 RX ADMIN — SODIUM CHLORIDE, PRESERVATIVE FREE 10 ML: 5 INJECTION INTRAVENOUS at 21:50

## 2019-01-01 RX ADMIN — HYDROCODONE BITARTRATE AND ACETAMINOPHEN 1 TABLET: 7.5; 325 TABLET ORAL at 21:13

## 2019-01-01 RX ADMIN — SODIUM CHLORIDE, PRESERVATIVE FREE 10 ML: 5 INJECTION INTRAVENOUS at 08:59

## 2019-01-01 RX ADMIN — DORZOLAMIDE HYDROCHLORIDE 1 DROP: 20 SOLUTION/ DROPS OPHTHALMIC at 22:02

## 2019-01-01 RX ADMIN — SODIUM CHLORIDE 100 ML/HR: 9 INJECTION, SOLUTION INTRAVENOUS at 19:47

## 2019-01-01 RX ADMIN — PETROLATUM: 42 OINTMENT TOPICAL at 20:52

## 2019-01-01 RX ADMIN — SODIUM CHLORIDE 100 ML/HR: 9 INJECTION, SOLUTION INTRAVENOUS at 05:56

## 2019-01-01 RX ADMIN — HYDROCODONE BITARTRATE AND ACETAMINOPHEN 1 TABLET: 7.5; 325 TABLET ORAL at 00:33

## 2019-01-01 RX ADMIN — LEVOTHYROXINE SODIUM 88 MCG: 88 TABLET ORAL at 06:24

## 2019-01-01 RX ADMIN — OXYCODONE AND ACETAMINOPHEN 2 TABLET: 5; 325 TABLET ORAL at 01:25

## 2019-01-01 RX ADMIN — Medication 100 MG: at 08:59

## 2019-01-01 RX ADMIN — POLYETHYLENE GLYCOL 3350 17 G: 17 POWDER, FOR SOLUTION ORAL at 11:30

## 2019-01-01 RX ADMIN — PETROLATUM: 42 OINTMENT TOPICAL at 09:15

## 2019-01-01 RX ADMIN — HYDROCODONE BITARTRATE AND ACETAMINOPHEN 1 TABLET: 7.5; 325 TABLET ORAL at 01:03

## 2019-01-01 RX ADMIN — FOLIC ACID 1 MG: 1 TABLET ORAL at 09:46

## 2019-01-01 RX ADMIN — TAMSULOSIN HYDROCHLORIDE 0.4 MG: 0.4 CAPSULE ORAL at 08:50

## 2019-01-01 RX ADMIN — DULOXETINE HYDROCHLORIDE 120 MG: 60 CAPSULE, DELAYED RELEASE ORAL at 21:45

## 2019-01-01 RX ADMIN — SODIUM CHLORIDE, PRESERVATIVE FREE 3 ML: 5 INJECTION INTRAVENOUS at 08:42

## 2019-01-01 RX ADMIN — TIMOLOL MALEATE 1 DROP: 5 SOLUTION OPHTHALMIC at 08:54

## 2019-01-01 RX ADMIN — Medication 296 ML: at 09:20

## 2019-01-01 RX ADMIN — ENOXAPARIN SODIUM 40 MG: 40 INJECTION SUBCUTANEOUS at 10:06

## 2019-01-01 RX ADMIN — FOLIC ACID 1 MG: 1 TABLET ORAL at 13:23

## 2019-01-01 RX ADMIN — THIAMINE HYDROCHLORIDE 100 MG: 100 INJECTION, SOLUTION INTRAMUSCULAR; INTRAVENOUS at 09:36

## 2019-01-01 RX ADMIN — Medication 100 MG: at 08:34

## 2019-01-01 RX ADMIN — HYDROCODONE BITARTRATE AND ACETAMINOPHEN 1 TABLET: 7.5; 325 TABLET ORAL at 09:45

## 2019-01-01 RX ADMIN — LEVOTHYROXINE SODIUM 88 MCG: 88 TABLET ORAL at 09:46

## 2019-01-01 RX ADMIN — HYDROCODONE BITARTRATE AND ACETAMINOPHEN 1 TABLET: 5; 325 TABLET ORAL at 10:47

## 2019-01-01 RX ADMIN — ASPIRIN 650 MG: 325 TABLET ORAL at 08:53

## 2019-01-01 RX ADMIN — THIAMINE HYDROCHLORIDE 100 MG: 100 INJECTION, SOLUTION INTRAMUSCULAR; INTRAVENOUS at 22:54

## 2019-01-01 RX ADMIN — PROPOFOL 180 MCG/KG/MIN: 10 INJECTION, EMULSION INTRAVENOUS at 10:53

## 2019-01-01 RX ADMIN — DORZOLAMIDE HYDROCHLORIDE 1 DROP: 20 SOLUTION/ DROPS OPHTHALMIC at 21:34

## 2019-01-01 RX ADMIN — LEVOTHYROXINE SODIUM 88 MCG: 88 TABLET ORAL at 06:11

## 2019-01-01 RX ADMIN — ENOXAPARIN SODIUM 40 MG: 40 INJECTION SUBCUTANEOUS at 21:12

## 2019-01-01 RX ADMIN — TIMOLOL MALEATE 1 DROP: 5 SOLUTION OPHTHALMIC at 21:19

## 2019-01-01 RX ADMIN — TIMOLOL MALEATE 1 DROP: 5 SOLUTION OPHTHALMIC at 21:31

## 2019-01-01 RX ADMIN — THIAMINE HYDROCHLORIDE 100 MG: 100 INJECTION, SOLUTION INTRAMUSCULAR; INTRAVENOUS at 09:11

## 2019-01-01 RX ADMIN — CEFTRIAXONE SODIUM 1000 MG: 1 INJECTION, POWDER, FOR SOLUTION INTRAMUSCULAR; INTRAVENOUS at 21:37

## 2019-01-01 RX ADMIN — TIMOLOL MALEATE 1 DROP: 5 SOLUTION OPHTHALMIC at 20:24

## 2019-01-01 RX ADMIN — MICONAZOLE NITRATE: 2 POWDER TOPICAL at 08:59

## 2019-01-01 RX ADMIN — POTASSIUM CHLORIDE 40 MEQ: 750 CAPSULE, EXTENDED RELEASE ORAL at 02:31

## 2019-01-01 RX ADMIN — SODIUM CHLORIDE 100 ML/HR: 9 INJECTION, SOLUTION INTRAVENOUS at 21:29

## 2019-01-01 RX ADMIN — SODIUM CHLORIDE, PRESERVATIVE FREE 3 ML: 5 INJECTION INTRAVENOUS at 20:50

## 2019-01-01 RX ADMIN — FOLIC ACID 1 MG: 1 TABLET ORAL at 08:20

## 2019-01-01 RX ADMIN — POLYETHYLENE GLYCOL 3350 17 G: 17 POWDER, FOR SOLUTION ORAL at 08:51

## 2019-01-01 RX ADMIN — PETROLATUM 1 APPLICATION: 42 OINTMENT TOPICAL at 08:11

## 2019-01-01 RX ADMIN — TIMOLOL MALEATE 1 DROP: 5 SOLUTION OPHTHALMIC at 08:55

## 2019-01-01 RX ADMIN — HYDROCODONE BITARTRATE AND ACETAMINOPHEN 1 TABLET: 7.5; 325 TABLET ORAL at 15:12

## 2019-01-01 RX ADMIN — FINASTERIDE 5 MG: 5 TABLET, FILM COATED ORAL at 08:42

## 2019-01-01 RX ADMIN — PETROLATUM: 42 OINTMENT TOPICAL at 08:53

## 2019-01-01 RX ADMIN — POLYETHYLENE GLYCOL 3350 17 G: 17 POWDER, FOR SOLUTION ORAL at 09:05

## 2019-01-01 RX ADMIN — ASPIRIN 325 MG: 325 TABLET ORAL at 08:55

## 2019-01-01 RX ADMIN — FINASTERIDE 5 MG: 5 TABLET, FILM COATED ORAL at 09:11

## 2019-01-01 RX ADMIN — SODIUM CHLORIDE 500 ML: 9 INJECTION, SOLUTION INTRAVENOUS at 20:04

## 2019-01-01 RX ADMIN — DORZOLAMIDE HYDROCHLORIDE 1 DROP: 20 SOLUTION/ DROPS OPHTHALMIC at 21:46

## 2019-01-01 RX ADMIN — HYDROCODONE BITARTRATE AND ACETAMINOPHEN 1 TABLET: 5; 325 TABLET ORAL at 09:16

## 2019-01-01 RX ADMIN — DORZOLAMIDE HYDROCHLORIDE 1 DROP: 20 SOLUTION/ DROPS OPHTHALMIC at 21:19

## 2019-01-01 RX ADMIN — DULOXETINE HYDROCHLORIDE 120 MG: 60 CAPSULE, DELAYED RELEASE ORAL at 20:32

## 2019-01-01 RX ADMIN — LEVOTHYROXINE SODIUM 88 MCG: 88 TABLET ORAL at 08:19

## 2019-01-01 RX ADMIN — SODIUM CHLORIDE, PRESERVATIVE FREE 10 ML: 5 INJECTION INTRAVENOUS at 08:51

## 2019-01-01 RX ADMIN — PHENYTOIN SODIUM 500 MG: 100 CAPSULE, EXTENDED RELEASE ORAL at 21:43

## 2019-01-01 RX ADMIN — ENOXAPARIN SODIUM 40 MG: 40 INJECTION SUBCUTANEOUS at 12:51

## 2019-01-01 RX ADMIN — TIMOLOL MALEATE 1 DROP: 5 SOLUTION OPHTHALMIC at 08:34

## 2019-01-01 RX ADMIN — METOPROLOL TARTRATE 25 MG: 25 TABLET ORAL at 21:34

## 2019-01-01 RX ADMIN — DULOXETINE HYDROCHLORIDE 120 MG: 60 CAPSULE, DELAYED RELEASE ORAL at 21:52

## 2019-01-01 RX ADMIN — HYDROCODONE BITARTRATE AND ACETAMINOPHEN 1 TABLET: 5; 325 TABLET ORAL at 12:01

## 2019-01-01 RX ADMIN — MICONAZOLE NITRATE: 2 POWDER TOPICAL at 21:19

## 2019-01-01 RX ADMIN — ASPIRIN 650 MG: 325 TABLET ORAL at 09:11

## 2019-01-01 RX ADMIN — ENOXAPARIN SODIUM 120 MG: 120 INJECTION SUBCUTANEOUS at 23:00

## 2019-01-01 RX ADMIN — PHENYTOIN SODIUM 500 MG: 100 CAPSULE, EXTENDED RELEASE ORAL at 11:17

## 2019-01-01 RX ADMIN — HYDROCODONE BITARTRATE AND ACETAMINOPHEN 1 TABLET: 7.5; 325 TABLET ORAL at 13:33

## 2019-01-01 RX ADMIN — TAMSULOSIN HYDROCHLORIDE 0.4 MG: 0.4 CAPSULE ORAL at 09:48

## 2019-01-01 RX ADMIN — FINASTERIDE 5 MG: 5 TABLET, FILM COATED ORAL at 08:08

## 2019-01-01 RX ADMIN — HYDROCODONE BITARTRATE AND ACETAMINOPHEN 1 TABLET: 7.5; 325 TABLET ORAL at 11:43

## 2019-01-01 RX ADMIN — TAMSULOSIN HYDROCHLORIDE 0.4 MG: 0.4 CAPSULE ORAL at 10:19

## 2019-01-01 RX ADMIN — ENOXAPARIN SODIUM 40 MG: 40 INJECTION SUBCUTANEOUS at 08:52

## 2019-01-01 RX ADMIN — OLANZAPINE 2.5 MG: 2.5 TABLET, FILM COATED ORAL at 08:29

## 2019-01-01 RX ADMIN — Medication 100 MG: at 09:20

## 2019-01-01 RX ADMIN — HYDROCODONE BITARTRATE AND ACETAMINOPHEN 1 TABLET: 5; 325 TABLET ORAL at 04:54

## 2019-01-01 RX ADMIN — SENNOSIDES AND DOCUSATE SODIUM 2 TABLET: 8.6; 5 TABLET ORAL at 22:21

## 2019-01-01 RX ADMIN — ASPIRIN 325 MG: 325 TABLET ORAL at 08:08

## 2019-01-01 RX ADMIN — LEVOTHYROXINE SODIUM 88 MCG: 88 TABLET ORAL at 06:38

## 2019-01-01 RX ADMIN — FINASTERIDE 5 MG: 5 TABLET, FILM COATED ORAL at 11:17

## 2019-01-01 RX ADMIN — ASPIRIN 650 MG: 325 TABLET ORAL at 09:37

## 2019-01-01 RX ADMIN — MICONAZOLE NITRATE: 2 POWDER TOPICAL at 09:50

## 2019-01-01 RX ADMIN — ENOXAPARIN SODIUM 40 MG: 40 INJECTION SUBCUTANEOUS at 22:14

## 2019-01-01 RX ADMIN — PETROLATUM 1 APPLICATION: 42 OINTMENT TOPICAL at 09:25

## 2019-01-01 RX ADMIN — DULOXETINE HYDROCHLORIDE 120 MG: 60 CAPSULE, DELAYED RELEASE ORAL at 20:11

## 2019-01-01 RX ADMIN — MICONAZOLE NITRATE: 2 POWDER TOPICAL at 20:24

## 2019-01-01 RX ADMIN — PHENYTOIN SODIUM 500 MG: 100 CAPSULE, EXTENDED RELEASE ORAL at 21:52

## 2019-01-01 RX ADMIN — OLANZAPINE 10 MG: 10 TABLET, FILM COATED ORAL at 01:11

## 2019-01-01 RX ADMIN — TAMSULOSIN HYDROCHLORIDE 0.4 MG: 0.4 CAPSULE ORAL at 09:44

## 2019-01-01 RX ADMIN — DORZOLAMIDE HYDROCHLORIDE 1 DROP: 20 SOLUTION/ DROPS OPHTHALMIC at 10:14

## 2019-01-01 RX ADMIN — OLANZAPINE 10 MG: 10 INJECTION, POWDER, FOR SOLUTION INTRAMUSCULAR at 05:22

## 2019-01-01 RX ADMIN — ACETAMINOPHEN 650 MG: 325 TABLET, FILM COATED ORAL at 16:54

## 2019-01-01 RX ADMIN — ASPIRIN 325 MG: 325 TABLET ORAL at 08:18

## 2019-01-01 RX ADMIN — HYDROCODONE BITARTRATE AND ACETAMINOPHEN 1 TABLET: 5; 325 TABLET ORAL at 00:13

## 2019-01-01 RX ADMIN — SODIUM CHLORIDE 100 ML/HR: 9 INJECTION, SOLUTION INTRAVENOUS at 14:52

## 2019-01-01 RX ADMIN — SODIUM CHLORIDE, PRESERVATIVE FREE 10 ML: 5 INJECTION INTRAVENOUS at 21:35

## 2019-01-01 RX ADMIN — ENOXAPARIN SODIUM 40 MG: 40 INJECTION SUBCUTANEOUS at 13:24

## 2019-01-01 RX ADMIN — TECHNETIUM TC 99M SESTAMIBI 1 DOSE: 1 INJECTION INTRAVENOUS at 11:15

## 2019-01-01 RX ADMIN — BACLOFEN 5 MG: 10 TABLET ORAL at 08:50

## 2019-01-01 RX ADMIN — CHLORPROMAZINE HYDROCHLORIDE 50 MG: 25 INJECTION INTRAMUSCULAR at 01:03

## 2019-01-01 RX ADMIN — PHENYTOIN SODIUM 500 MG: 100 CAPSULE, EXTENDED RELEASE ORAL at 21:33

## 2019-01-01 RX ADMIN — DORZOLAMIDE HYDROCHLORIDE 1 DROP: 20 SOLUTION/ DROPS OPHTHALMIC at 08:53

## 2019-01-01 RX ADMIN — HYDROCODONE BITARTRATE AND ACETAMINOPHEN 1 TABLET: 7.5; 325 TABLET ORAL at 04:37

## 2019-01-01 RX ADMIN — BACLOFEN 5 MG: 10 TABLET ORAL at 20:23

## 2019-01-01 RX ADMIN — DULOXETINE HYDROCHLORIDE 120 MG: 60 CAPSULE, DELAYED RELEASE ORAL at 20:15

## 2019-01-01 RX ADMIN — SODIUM CHLORIDE 100 ML/HR: 9 INJECTION, SOLUTION INTRAVENOUS at 05:49

## 2019-01-01 RX ADMIN — PETROLATUM: 42 OINTMENT TOPICAL at 23:21

## 2019-01-01 RX ADMIN — PHENYTOIN SODIUM 500 MG: 100 CAPSULE, EXTENDED RELEASE ORAL at 20:24

## 2019-01-01 RX ADMIN — METOPROLOL TARTRATE 25 MG: 25 TABLET ORAL at 10:23

## 2019-01-01 RX ADMIN — DULOXETINE HYDROCHLORIDE 120 MG: 60 CAPSULE, DELAYED RELEASE ORAL at 20:50

## 2019-01-01 RX ADMIN — TIMOLOL MALEATE 1 DROP: 5 SOLUTION OPHTHALMIC at 09:02

## 2019-01-01 RX ADMIN — CEFTRIAXONE SODIUM 1 G: 1 INJECTION, SOLUTION INTRAVENOUS at 14:52

## 2019-01-01 RX ADMIN — HYDROCODONE BITARTRATE AND ACETAMINOPHEN 1 TABLET: 7.5; 325 TABLET ORAL at 05:39

## 2019-01-01 RX ADMIN — SODIUM CHLORIDE, POTASSIUM CHLORIDE, SODIUM LACTATE AND CALCIUM CHLORIDE 1000 ML: 600; 310; 30; 20 INJECTION, SOLUTION INTRAVENOUS at 17:45

## 2019-01-01 RX ADMIN — FINASTERIDE 5 MG: 5 TABLET, FILM COATED ORAL at 08:34

## 2019-01-01 RX ADMIN — POLYETHYLENE GLYCOL 3350 17 G: 17 POWDER, FOR SOLUTION ORAL at 08:59

## 2019-01-01 RX ADMIN — METOPROLOL TARTRATE 12.5 MG: 25 TABLET ORAL at 13:23

## 2019-01-01 RX ADMIN — Medication 100 MG: at 08:20

## 2019-01-01 RX ADMIN — SODIUM CHLORIDE, PRESERVATIVE FREE 10 ML: 5 INJECTION INTRAVENOUS at 09:02

## 2019-01-01 RX ADMIN — OLANZAPINE 2.5 MG: 2.5 TABLET, FILM COATED ORAL at 21:18

## 2019-01-01 RX ADMIN — TAMSULOSIN HYDROCHLORIDE 0.4 MG: 0.4 CAPSULE ORAL at 08:20

## 2019-01-01 RX ADMIN — DORZOLAMIDE HYDROCHLORIDE 1 DROP: 20 SOLUTION/ DROPS OPHTHALMIC at 21:36

## 2019-01-01 RX ADMIN — ENOXAPARIN SODIUM 40 MG: 40 INJECTION SUBCUTANEOUS at 23:16

## 2019-01-01 RX ADMIN — HYDROCODONE BITARTRATE AND ACETAMINOPHEN 1 TABLET: 7.5; 325 TABLET ORAL at 07:42

## 2019-01-01 RX ADMIN — SENNOSIDES AND DOCUSATE SODIUM 2 TABLET: 8.6; 5 TABLET ORAL at 21:31

## 2019-01-01 RX ADMIN — SODIUM CHLORIDE, PRESERVATIVE FREE 3 ML: 5 INJECTION INTRAVENOUS at 09:37

## 2019-01-01 RX ADMIN — PHENYTOIN SODIUM 500 MG: 100 CAPSULE, EXTENDED RELEASE ORAL at 21:32

## 2019-01-01 RX ADMIN — FINASTERIDE 5 MG: 5 TABLET, FILM COATED ORAL at 09:01

## 2019-01-01 RX ADMIN — POLYETHYLENE GLYCOL 3350 17 G: 17 POWDER, FOR SOLUTION ORAL at 09:45

## 2019-01-01 RX ADMIN — HYDROCODONE BITARTRATE AND ACETAMINOPHEN 1 TABLET: 5; 325 TABLET ORAL at 14:25

## 2019-01-01 RX ADMIN — DORZOLAMIDE HYDROCHLORIDE 1 DROP: 20 SOLUTION/ DROPS OPHTHALMIC at 21:31

## 2019-01-01 RX ADMIN — SODIUM CHLORIDE, PRESERVATIVE FREE 10 ML: 5 INJECTION INTRAVENOUS at 08:05

## 2019-01-01 RX ADMIN — METOPROLOL TARTRATE 25 MG: 25 TABLET ORAL at 12:05

## 2019-01-01 RX ADMIN — HYDROCODONE BITARTRATE AND ACETAMINOPHEN 1 TABLET: 7.5; 325 TABLET ORAL at 08:36

## 2019-01-01 RX ADMIN — BACLOFEN 5 MG: 10 TABLET ORAL at 08:18

## 2019-01-01 RX ADMIN — TIMOLOL MALEATE 1 DROP: 5 SOLUTION OPHTHALMIC at 08:04

## 2019-01-01 RX ADMIN — POLYETHYLENE GLYCOL 3350 17 G: 17 POWDER, FOR SOLUTION ORAL at 08:24

## 2019-01-01 RX ADMIN — ASPIRIN 650 MG: 325 TABLET ORAL at 09:03

## 2019-01-01 RX ADMIN — OLANZAPINE 2.5 MG: 2.5 TABLET, FILM COATED ORAL at 21:34

## 2019-01-01 RX ADMIN — OLANZAPINE 2.5 MG: 2.5 TABLET, FILM COATED ORAL at 08:36

## 2019-01-01 RX ADMIN — SENNOSIDES AND DOCUSATE SODIUM 2 TABLET: 8.6; 5 TABLET ORAL at 21:34

## 2019-01-01 RX ADMIN — HYDROCODONE BITARTRATE AND ACETAMINOPHEN 1 TABLET: 7.5; 325 TABLET ORAL at 16:18

## 2019-01-01 RX ADMIN — FOLIC ACID 1 MG: 1 TABLET ORAL at 08:13

## 2019-01-01 RX ADMIN — FOLIC ACID 1 MG: 1 TABLET ORAL at 09:47

## 2019-01-01 RX ADMIN — SODIUM CHLORIDE 100 ML/HR: 9 INJECTION, SOLUTION INTRAVENOUS at 06:57

## 2019-01-01 RX ADMIN — HYDROCODONE BITARTRATE AND ACETAMINOPHEN 1 TABLET: 7.5; 325 TABLET ORAL at 20:23

## 2019-01-01 RX ADMIN — SODIUM CHLORIDE, PRESERVATIVE FREE 3 ML: 5 INJECTION INTRAVENOUS at 20:12

## 2019-01-01 RX ADMIN — BACLOFEN 5 MG: 10 TABLET ORAL at 20:24

## 2019-01-01 RX ADMIN — DIPHENHYDRAMINE HYDROCHLORIDE 25 MG: 25 CAPSULE ORAL at 20:13

## 2019-01-01 RX ADMIN — TECHNETIUM TC 99M SESTAMIBI 1 DOSE: 1 INJECTION INTRAVENOUS at 08:55

## 2019-01-01 RX ADMIN — METOPROLOL TARTRATE 12.5 MG: 25 TABLET ORAL at 01:57

## 2019-01-01 RX ADMIN — TIMOLOL MALEATE 1 DROP: 5 SOLUTION OPHTHALMIC at 21:32

## 2019-01-01 RX ADMIN — LIDOCAINE HYDROCHLORIDE 2.1 ML: 10 INJECTION, SOLUTION EPIDURAL; INFILTRATION; INTRACAUDAL; PERINEURAL at 21:36

## 2019-01-01 RX ADMIN — SODIUM CHLORIDE, PRESERVATIVE FREE 10 ML: 5 INJECTION INTRAVENOUS at 20:25

## 2019-01-01 RX ADMIN — PETROLATUM: 42 OINTMENT TOPICAL at 20:33

## 2019-01-01 RX ADMIN — CEFDINIR 300 MG: 300 CAPSULE ORAL at 21:15

## 2019-01-01 RX ADMIN — SENNOSIDES AND DOCUSATE SODIUM 2 TABLET: 8.6; 5 TABLET ORAL at 01:09

## 2019-01-01 RX ADMIN — ASPIRIN 325 MG: 325 TABLET ORAL at 09:46

## 2019-01-01 RX ADMIN — BACLOFEN 5 MG: 10 TABLET ORAL at 08:20

## 2019-01-01 RX ADMIN — FINASTERIDE 5 MG: 5 TABLET, FILM COATED ORAL at 09:46

## 2019-01-01 RX ADMIN — DULOXETINE HYDROCHLORIDE 120 MG: 60 CAPSULE, DELAYED RELEASE ORAL at 01:11

## 2019-01-01 RX ADMIN — AMIODARONE HYDROCHLORIDE 200 MG: 200 TABLET ORAL at 08:50

## 2019-01-01 RX ADMIN — SODIUM CHLORIDE, PRESERVATIVE FREE 10 ML: 5 INJECTION INTRAVENOUS at 20:23

## 2019-01-01 RX ADMIN — FINASTERIDE 5 MG: 5 TABLET, FILM COATED ORAL at 08:13

## 2019-01-01 RX ADMIN — SODIUM CHLORIDE, PRESERVATIVE FREE 10 ML: 5 INJECTION INTRAVENOUS at 21:57

## 2019-01-01 RX ADMIN — ENOXAPARIN SODIUM 40 MG: 40 INJECTION SUBCUTANEOUS at 09:46

## 2019-01-01 RX ADMIN — TIMOLOL MALEATE 1 DROP: 5 SOLUTION OPHTHALMIC at 21:46

## 2019-01-01 RX ADMIN — OLANZAPINE 2.5 MG: 2.5 TABLET, FILM COATED ORAL at 08:18

## 2019-01-01 RX ADMIN — Medication 100 MG: at 08:52

## 2019-01-01 RX ADMIN — SODIUM CHLORIDE, POTASSIUM CHLORIDE, SODIUM LACTATE AND CALCIUM CHLORIDE 30 ML/HR: 600; 310; 30; 20 INJECTION, SOLUTION INTRAVENOUS at 09:42

## 2019-01-01 RX ADMIN — POLYETHYLENE GLYCOL 3350 17 G: 17 POWDER, FOR SOLUTION ORAL at 08:01

## 2019-01-01 RX ADMIN — HYDROCODONE BITARTRATE AND ACETAMINOPHEN 1 TABLET: 7.5; 325 TABLET ORAL at 21:55

## 2019-01-01 RX ADMIN — SODIUM CHLORIDE, PRESERVATIVE FREE 10 ML: 5 INJECTION INTRAVENOUS at 08:53

## 2019-01-01 RX ADMIN — ACETAMINOPHEN 650 MG: 325 TABLET, FILM COATED ORAL at 19:57

## 2019-01-01 RX ADMIN — HYDROCODONE BITARTRATE AND ACETAMINOPHEN 1 TABLET: 7.5; 325 TABLET ORAL at 18:29

## 2019-01-01 RX ADMIN — HYDROCODONE BITARTRATE AND ACETAMINOPHEN 1 TABLET: 7.5; 325 TABLET ORAL at 04:42

## 2019-01-01 RX ADMIN — ENOXAPARIN SODIUM 40 MG: 40 INJECTION SUBCUTANEOUS at 09:44

## 2019-01-01 RX ADMIN — DORZOLAMIDE HYDROCHLORIDE 1 DROP: 20 SOLUTION/ DROPS OPHTHALMIC at 08:36

## 2019-01-01 RX ADMIN — HYDROMORPHONE HYDROCHLORIDE 0.5 MG: 1 INJECTION, SOLUTION INTRAMUSCULAR; INTRAVENOUS; SUBCUTANEOUS at 21:39

## 2019-01-01 RX ADMIN — SODIUM CHLORIDE, PRESERVATIVE FREE 3 ML: 5 INJECTION INTRAVENOUS at 20:07

## 2019-01-01 RX ADMIN — TAMSULOSIN HYDROCHLORIDE 0.4 MG: 0.4 CAPSULE ORAL at 08:29

## 2019-01-01 RX ADMIN — OLANZAPINE 10 MG: 10 TABLET, FILM COATED ORAL at 20:55

## 2019-01-01 RX ADMIN — HYDROCODONE BITARTRATE AND ACETAMINOPHEN 1 TABLET: 7.5; 325 TABLET ORAL at 21:35

## 2019-01-01 RX ADMIN — HYDROCODONE BITARTRATE AND ACETAMINOPHEN 1 TABLET: 7.5; 325 TABLET ORAL at 05:04

## 2019-01-01 RX ADMIN — HYDROCODONE BITARTRATE AND ACETAMINOPHEN 1 TABLET: 7.5; 325 TABLET ORAL at 11:39

## 2019-01-01 RX ADMIN — SODIUM CHLORIDE, PRESERVATIVE FREE 10 ML: 5 INJECTION INTRAVENOUS at 09:46

## 2019-01-01 RX ADMIN — Medication 100 MG: at 12:06

## 2019-01-01 RX ADMIN — SODIUM CHLORIDE 500 ML: 9 INJECTION, SOLUTION INTRAVENOUS at 15:02

## 2019-01-01 RX ADMIN — SODIUM CHLORIDE 500 ML: 9 INJECTION, SOLUTION INTRAVENOUS at 09:47

## 2019-01-01 RX ADMIN — ENOXAPARIN SODIUM 40 MG: 40 INJECTION SUBCUTANEOUS at 11:43

## 2019-01-01 RX ADMIN — AMIODARONE HYDROCHLORIDE 200 MG: 200 TABLET ORAL at 08:13

## 2019-01-01 RX ADMIN — ASPIRIN 325 MG: 325 TABLET ORAL at 12:05

## 2019-01-01 RX ADMIN — SODIUM CHLORIDE, PRESERVATIVE FREE 3 ML: 5 INJECTION INTRAVENOUS at 20:15

## 2019-01-01 RX ADMIN — SODIUM CHLORIDE, PRESERVATIVE FREE 3 ML: 5 INJECTION INTRAVENOUS at 20:43

## 2019-01-01 RX ADMIN — ENOXAPARIN SODIUM 40 MG: 40 INJECTION SUBCUTANEOUS at 12:31

## 2019-01-01 RX ADMIN — HYDROCODONE BITARTRATE AND ACETAMINOPHEN 1 TABLET: 7.5; 325 TABLET ORAL at 22:40

## 2019-01-01 RX ADMIN — ASPIRIN 650 MG: 325 TABLET ORAL at 09:14

## 2019-01-01 RX ADMIN — ASPIRIN 325 MG: 325 TABLET ORAL at 08:59

## 2019-01-01 RX ADMIN — ENOXAPARIN SODIUM 40 MG: 40 INJECTION SUBCUTANEOUS at 11:06

## 2019-01-01 RX ADMIN — HYDROCODONE BITARTRATE AND ACETAMINOPHEN 1 TABLET: 5; 325 TABLET ORAL at 05:21

## 2019-01-01 RX ADMIN — HYDROCODONE BITARTRATE AND ACETAMINOPHEN 1 TABLET: 7.5; 325 TABLET ORAL at 06:32

## 2019-01-01 RX ADMIN — PHENYTOIN SODIUM 500 MG: 100 CAPSULE, EXTENDED RELEASE ORAL at 21:44

## 2019-01-01 RX ADMIN — PHENYTOIN SODIUM 500 MG: 100 CAPSULE, EXTENDED RELEASE ORAL at 20:15

## 2019-01-01 RX ADMIN — OLANZAPINE 2.5 MG: 2.5 TABLET, FILM COATED ORAL at 20:23

## 2019-01-01 RX ADMIN — PETROLATUM: 42 OINTMENT TOPICAL at 21:16

## 2019-01-01 RX ADMIN — POLYETHYLENE GLYCOL 3350 17 G: 17 POWDER, FOR SOLUTION ORAL at 09:02

## 2019-01-01 RX ADMIN — SODIUM CHLORIDE, PRESERVATIVE FREE 3 ML: 5 INJECTION INTRAVENOUS at 20:32

## 2019-01-01 RX ADMIN — HYDROCODONE BITARTRATE AND ACETAMINOPHEN 1 TABLET: 7.5; 325 TABLET ORAL at 21:33

## 2019-01-01 RX ADMIN — SENNOSIDES AND DOCUSATE SODIUM 2 TABLET: 8.6; 5 TABLET ORAL at 21:18

## 2019-01-01 RX ADMIN — PETROLATUM: 42 OINTMENT TOPICAL at 20:07

## 2019-01-01 RX ADMIN — TIMOLOL MALEATE 1 DROP: 5 SOLUTION OPHTHALMIC at 08:36

## 2019-01-01 RX ADMIN — SENNOSIDES AND DOCUSATE SODIUM 2 TABLET: 8.6; 5 TABLET ORAL at 21:56

## 2019-01-01 RX ADMIN — HYDROCODONE BITARTRATE AND ACETAMINOPHEN 1 TABLET: 7.5; 325 TABLET ORAL at 17:14

## 2019-01-01 RX ADMIN — HYDROCODONE BITARTRATE AND ACETAMINOPHEN 1 TABLET: 7.5; 325 TABLET ORAL at 18:04

## 2019-01-01 RX ADMIN — SODIUM CHLORIDE, PRESERVATIVE FREE 10 ML: 5 INJECTION INTRAVENOUS at 09:41

## 2019-01-01 RX ADMIN — HYDROCODONE BITARTRATE AND ACETAMINOPHEN 1 TABLET: 7.5; 325 TABLET ORAL at 18:47

## 2019-01-01 RX ADMIN — FINASTERIDE 5 MG: 5 TABLET, FILM COATED ORAL at 08:50

## 2019-01-01 RX ADMIN — OLANZAPINE 5 MG: 5 TABLET, FILM COATED ORAL at 09:44

## 2019-01-01 RX ADMIN — POLYETHYLENE GLYCOL 3350 17 G: 17 POWDER, FOR SOLUTION ORAL at 08:29

## 2019-01-01 RX ADMIN — PETROLATUM 1 APPLICATION: 42 OINTMENT TOPICAL at 21:46

## 2019-01-01 RX ADMIN — ENOXAPARIN SODIUM 40 MG: 40 INJECTION SUBCUTANEOUS at 10:19

## 2019-01-01 RX ADMIN — PHENYTOIN SODIUM 500 MG: 100 CAPSULE, EXTENDED RELEASE ORAL at 21:38

## 2019-01-01 RX ADMIN — POLYETHYLENE GLYCOL 3350 17 G: 17 POWDER, FOR SOLUTION ORAL at 08:09

## 2019-01-01 RX ADMIN — ENOXAPARIN SODIUM 40 MG: 40 INJECTION SUBCUTANEOUS at 22:06

## 2019-01-01 RX ADMIN — SODIUM CHLORIDE, PRESERVATIVE FREE 3 ML: 5 INJECTION INTRAVENOUS at 21:12

## 2019-01-01 RX ADMIN — TIMOLOL MALEATE 1 DROP: 5 SOLUTION OPHTHALMIC at 08:19

## 2019-01-01 RX ADMIN — SODIUM CHLORIDE, PRESERVATIVE FREE 10 ML: 5 INJECTION INTRAVENOUS at 08:22

## 2019-01-01 RX ADMIN — TAMSULOSIN HYDROCHLORIDE 0.4 MG: 0.4 CAPSULE ORAL at 17:14

## 2019-01-01 RX ADMIN — ASPIRIN 325 MG: 325 TABLET ORAL at 11:18

## 2019-01-01 RX ADMIN — ACETAMINOPHEN 650 MG: 325 TABLET, FILM COATED ORAL at 20:49

## 2019-01-01 RX ADMIN — BACLOFEN 5 MG: 10 TABLET ORAL at 08:32

## 2019-01-01 RX ADMIN — BACLOFEN 5 MG: 10 TABLET ORAL at 21:33

## 2019-01-01 RX ADMIN — OLANZAPINE 2.5 MG: 2.5 TABLET, FILM COATED ORAL at 21:31

## 2019-01-01 RX ADMIN — TIMOLOL MALEATE 1 DROP: 5 SOLUTION OPHTHALMIC at 09:45

## 2019-01-01 RX ADMIN — TAMSULOSIN HYDROCHLORIDE 0.4 MG: 0.4 CAPSULE ORAL at 09:47

## 2019-01-01 RX ADMIN — PHENYTOIN SODIUM 500 MG: 100 CAPSULE, EXTENDED RELEASE ORAL at 01:30

## 2019-01-01 RX ADMIN — ENOXAPARIN SODIUM 40 MG: 40 INJECTION SUBCUTANEOUS at 08:19

## 2019-01-01 RX ADMIN — FINASTERIDE 5 MG: 5 TABLET, FILM COATED ORAL at 12:05

## 2019-01-01 RX ADMIN — MICONAZOLE NITRATE: 2 POWDER TOPICAL at 08:27

## 2019-01-01 RX ADMIN — FINASTERIDE 5 MG: 5 TABLET, FILM COATED ORAL at 08:29

## 2019-01-01 RX ADMIN — POLYETHYLENE GLYCOL 3350 17 G: 17 POWDER, FOR SOLUTION ORAL at 12:04

## 2019-01-01 RX ADMIN — MICONAZOLE NITRATE: 2 POWDER TOPICAL at 09:47

## 2019-01-01 RX ADMIN — PHENYTOIN SODIUM 500 MG: 100 CAPSULE, EXTENDED RELEASE ORAL at 21:14

## 2019-01-01 RX ADMIN — PHENYTOIN SODIUM 500 MG: 100 CAPSULE, EXTENDED RELEASE ORAL at 22:54

## 2019-01-01 RX ADMIN — LEVOTHYROXINE SODIUM 88 MCG: 88 TABLET ORAL at 06:47

## 2019-01-01 RX ADMIN — Medication 100 MG: at 09:03

## 2019-01-01 RX ADMIN — HYDROCODONE BITARTRATE AND ACETAMINOPHEN 1 TABLET: 7.5; 325 TABLET ORAL at 06:26

## 2019-01-01 RX ADMIN — MORPHINE SULFATE 4 MG: 2 INJECTION, SOLUTION INTRAMUSCULAR; INTRAVENOUS at 12:20

## 2019-01-01 RX ADMIN — ENOXAPARIN SODIUM 40 MG: 40 INJECTION SUBCUTANEOUS at 00:34

## 2019-01-01 RX ADMIN — LEVOTHYROXINE SODIUM 88 MCG: 88 TABLET ORAL at 08:20

## 2019-01-01 RX ADMIN — HYDROCODONE BITARTRATE AND ACETAMINOPHEN 1 TABLET: 5; 325 TABLET ORAL at 18:08

## 2019-01-01 RX ADMIN — ACETAMINOPHEN 650 MG: 325 TABLET, FILM COATED ORAL at 23:37

## 2019-01-01 RX ADMIN — ACETAMINOPHEN 650 MG: 325 TABLET, FILM COATED ORAL at 00:41

## 2019-01-01 RX ADMIN — POLYETHYLENE GLYCOL 3350 17 G: 17 POWDER, FOR SOLUTION ORAL at 09:48

## 2019-01-01 RX ADMIN — LEVOTHYROXINE SODIUM 88 MCG: 88 TABLET ORAL at 11:18

## 2019-01-01 RX ADMIN — HYDROCODONE BITARTRATE AND ACETAMINOPHEN 1 TABLET: 7.5; 325 TABLET ORAL at 21:42

## 2019-01-01 RX ADMIN — SODIUM CHLORIDE, PRESERVATIVE FREE 10 ML: 5 INJECTION INTRAVENOUS at 08:08

## 2019-01-01 RX ADMIN — TAMSULOSIN HYDROCHLORIDE 0.4 MG: 0.4 CAPSULE ORAL at 08:13

## 2019-01-01 RX ADMIN — FOLIC ACID 1 MG: 1 TABLET ORAL at 09:03

## 2019-01-01 RX ADMIN — ASPIRIN 650 MG: 325 TABLET ORAL at 08:42

## 2019-01-01 RX ADMIN — MICONAZOLE NITRATE: 2 POWDER TOPICAL at 21:36

## 2019-01-01 RX ADMIN — DORZOLAMIDE HYDROCHLORIDE 1 DROP: 20 SOLUTION/ DROPS OPHTHALMIC at 09:02

## 2019-01-01 RX ADMIN — LEVOTHYROXINE SODIUM 88 MCG: 88 TABLET ORAL at 06:08

## 2019-01-01 RX ADMIN — DORZOLAMIDE HYDROCHLORIDE 1 DROP: 20 SOLUTION/ DROPS OPHTHALMIC at 08:04

## 2019-01-01 RX ADMIN — HYDROCODONE BITARTRATE AND ACETAMINOPHEN 1 TABLET: 5; 325 TABLET ORAL at 23:59

## 2019-01-01 RX ADMIN — OLANZAPINE 2.5 MG: 2.5 TABLET, FILM COATED ORAL at 09:01

## 2019-01-01 RX ADMIN — METOPROLOL TARTRATE 25 MG: 25 TABLET ORAL at 09:44

## 2019-01-01 RX ADMIN — METOPROLOL TARTRATE 25 MG: 25 TABLET ORAL at 21:18

## 2019-01-01 RX ADMIN — FOLIC ACID 1 MG: 1 TABLET ORAL at 12:05

## 2019-01-01 RX ADMIN — FOLIC ACID 1 MG: 1 TABLET ORAL at 09:01

## 2019-01-01 RX ADMIN — HYDROCODONE BITARTRATE AND ACETAMINOPHEN 1 TABLET: 7.5; 325 TABLET ORAL at 12:47

## 2019-01-01 RX ADMIN — POLYETHYLENE GLYCOL 3350 17 G: 17 POWDER, FOR SOLUTION ORAL at 08:19

## 2019-01-01 RX ADMIN — OLANZAPINE 10 MG: 10 INJECTION, POWDER, FOR SOLUTION INTRAMUSCULAR at 21:17

## 2019-01-15 RX ORDER — LEVOTHYROXINE SODIUM 88 UG/1
TABLET ORAL
Qty: 90 TABLET | Refills: 1 | OUTPATIENT
Start: 2019-01-15

## 2019-01-15 RX ORDER — LISINOPRIL AND HYDROCHLOROTHIAZIDE 20; 12.5 MG/1; MG/1
TABLET ORAL
Qty: 90 TABLET | Refills: 1 | OUTPATIENT
Start: 2019-01-15

## 2019-01-16 RX ORDER — DULOXETIN HYDROCHLORIDE 60 MG/1
CAPSULE, DELAYED RELEASE ORAL
Qty: 60 CAPSULE | Refills: 2 | Status: SHIPPED | OUTPATIENT
Start: 2019-01-16 | End: 2019-01-01 | Stop reason: HOSPADM

## 2019-01-31 ENCOUNTER — TELEPHONE (OUTPATIENT)
Dept: NEUROLOGY | Facility: CLINIC | Age: 69
End: 2019-01-31

## 2019-01-31 RX ORDER — PHENYTOIN SODIUM 100 MG/1
500 CAPSULE, EXTENDED RELEASE ORAL NIGHTLY
Qty: 150 CAPSULE | Refills: 3 | Status: SHIPPED | OUTPATIENT
Start: 2019-01-31 | End: 2019-02-11 | Stop reason: DRUGHIGH

## 2019-02-11 RX ORDER — PHENYTOIN SODIUM 100 MG/1
500 CAPSULE, EXTENDED RELEASE ORAL NIGHTLY
Qty: 150 CAPSULE | Refills: 0 | Status: SHIPPED | OUTPATIENT
Start: 2019-02-11 | End: 2019-01-01

## 2019-02-25 RX ORDER — LISINOPRIL AND HYDROCHLOROTHIAZIDE 20; 12.5 MG/1; MG/1
TABLET ORAL
Qty: 90 TABLET | Refills: 0 | OUTPATIENT
Start: 2019-02-25

## 2019-02-25 RX ORDER — LEVOTHYROXINE SODIUM 88 UG/1
TABLET ORAL
Qty: 90 TABLET | Refills: 0 | Status: SHIPPED | OUTPATIENT
Start: 2019-02-25

## 2019-03-01 RX ORDER — LISINOPRIL AND HYDROCHLOROTHIAZIDE 20; 12.5 MG/1; MG/1
1 TABLET ORAL DAILY
Qty: 30 TABLET | Refills: 0 | Status: SHIPPED | OUTPATIENT
Start: 2019-03-01 | End: 2019-01-01 | Stop reason: HOSPADM

## 2019-04-06 PROBLEM — Z86.711 HX PULMONARY EMBOLISM: Status: ACTIVE | Noted: 2019-01-01

## 2019-04-06 PROBLEM — G40.909 SEIZURE DISORDER (HCC): Status: ACTIVE | Noted: 2019-01-01

## 2019-04-06 PROBLEM — R44.3 HALLUCINATIONS: Status: ACTIVE | Noted: 2019-01-01

## 2019-04-06 NOTE — PROGRESS NOTES
Bilateral lower extremity venous doppler completed, Prelim positive for Rt chronic calf vein thrombus and Lt negative given to CYN Menjivar.

## 2019-04-06 NOTE — ED PROVIDER NOTES
Pt presents to the ED c/o intermittent confusion that began around 0700 this morning when the pt woke up. He reports that he was unable to recognize his surrounds and that he was having visual hallucinations. He also c/o chronic bilateral leg pain.     PHYSICAL EXAM  GENERAL: not distressed  HENT: nares patent, dry mucus membranes  EYES: EOMI, PERRL  NECK: FROM  CV: regular rhythm, regular rate  RESPIRATORY: normal effort  ABDOMEN: soft  MUSCULOSKELETAL: no deformity  NEURO: alert, oriented X 3, pt is tremulous, mild weakness to BLE  SKIN: warm, dry    Vital signs and nursing notes reviewed.    LAB RESULTS AND RADIOLOGY  I have reviewed the patient's labs and imaging studies.    The CT head is negative and the doppler shows a chronic DVT.     The plan is to check labs and then dipostiion the pt pending the results.    EKG          EKG time: 1328  Rhythm/Rate: NSR 91  P waves and OR: nml  QRS, axis: nml axis, inferior Q waves   ST and T waves: nml     Interpreted Contemporaneously by me, independently viewed  No prior       Attestation:    The SUHAIL and I have discussed this patient's history, physical exam, and treatment plan.  I have reviewed the documentation and personally had a face to face interaction with the patient. I affirm the documentation and agree with the treatment and plan.  The attached note describes my personal findings.    Documentation assistance provided by britney Chu for Dr. Wheat Information recorded by the britney was done at my direction and has been verified and validated by me.         Jennifer Chu  04/06/19 0992       Tahir Wheat MD  04/06/19 0145

## 2019-04-06 NOTE — H&P
Patient Name:  Chava Stone Sr.  YOB: 1950  MRN:  [id number]  Admit Date:  4/6/2019  Patient Care Team:  Gordy Gupta MD as PCP - General  Amy Guerra APRN as Nurse Practitioner (Pain Medicine)  Harpreet Shay MD as Surgeon (Neurosurgery)  Catracho Yo MD as Consulting Physician (Neurology)  Torrey Santana MD as Consulting Physician (Urology)  Ced Castro MD as Consulting Physician (Pain Medicine)  Willy Garner MD as Referring Physician (Internal Medicine)      Subjective   History Present Illness     Chief Complaint   Patient presents with   • Leg Pain     C/O BILAT. LEG PAIN; PT WITH OF CHRONIC PAIN AND IS FOLLOWED BY A PAIN MD   • Altered Mental Status     PT REPORTS WAKING THIS AM CONFUSED, PT AA&O x4 NOW; PT RECENTLY MOVED IN WITH HIS SON AND SON REPORTS THAT HE CAN'T TAKE CARE OF HIM ANYMORE       Mr. Stone is a 68 y.o. non-smoker with a history of chronic peripheral neuropathy with history of spinal stimulator, C. difficile, DVT and PE, hypothyroidism, HTN that presents to [facility] complaining of hallucinations.  He had an episode last week of seeing children in his house and confusion that lasted about 3 hours.  He denies associated neurologic deficits.  This morning he woke up, confused and thought he was in his old home on the couch.  He got concerned so he and his son called EMS.  Again no focal neurologic deficits and confusion was less than an hour.  He denies recent medication changes.  He does have chronic lower extremity pain from neuropathy but denies recent medication changes.  His neuropathic pain ranges in severity which is normal for him.  He was admitted October 2018 for his neuropathy was not thought to be myelopathy. During the workup of his back pain, CT scan showed some lytic lesions and oncology felt unremarkable.    Patient lives with his son and is primarily wheelchair bound due to his chronic leg pain.  He  states he has had intermittent diarrhea since he was released from rehab in December 2018.  He denies fever, chills, abdominal pain, weight loss, chest pain, shortness of breath, rotations, headache, speech or vision changes, focal weakness, seizure or syncope.        History of Present Illness  Review of Systems   Constitutional: Negative for appetite change and fatigue.   HENT: Negative for congestion, rhinorrhea and trouble swallowing.    Eyes: Negative for visual disturbance.   Respiratory: Negative for cough, choking, chest tightness and shortness of breath.    Cardiovascular: Negative for chest pain, palpitations and leg swelling.   Gastrointestinal: Negative for abdominal distention, abdominal pain, blood in stool, constipation, diarrhea, nausea and vomiting.   Endocrine: Negative for polydipsia, polyphagia and polyuria.   Genitourinary: Negative for difficulty urinating, dysuria and frequency.   Musculoskeletal: Positive for arthralgias. Negative for back pain.   Skin: Negative for pallor.   Neurological: Positive for weakness. Negative for dizziness, tremors, seizures, syncope, facial asymmetry, speech difficulty, light-headedness, numbness and headaches.   Hematological: Does not bruise/bleed easily.   Psychiatric/Behavioral: Positive for confusion and hallucinations.        Personal History     Past Medical History:   Diagnosis Date   • Anemia    • Arthritis    • At risk for sleep apnea     STOP BANG 5   • Chronic pain    • Diverticulosis of sigmoid colon 07/14/2011   • DVT (deep venous thrombosis) (CMS/[facility])    • Epilepsy (CMS/HCC)     Since childhood   • History of solitary pulmonary nodule    • History of vitamin D deficiency    • Hyperprolactinemia (CMS/[facility]) 2011   • Hypertension    • Hypogonadism in male    • Hypothyroidism    • Injury of back    • Leg pain    • Low back pain     DDD   • Lumbar canal stenosis    • Lumbar radiculopathy    • Lytic bone lesions on xray 2018   • Peripheral neuropathy    •  Pulmonary embolism (CMS/HCC)      Past Surgical History:   Procedure Laterality Date   • COLONOSCOPY      1 polyp a 18 cm proximal to anus; sigmoid diverticulosis   • EPIDURAL BLOCK     • HAND SURGERY Right    • PROSTATE SURGERY      Biopsy, benign   • SPINAL CORD STIMULATOR IMPLANT     • SPINAL CORD STIMULATOR IMPLANT N/A 2018    Procedure: [place] [facility]- Spinal Cord Stimulator revision--battery ;  Surgeon: Ced Castro MD;  Location: [facility] OR;  Service: Pain Management   • THYROID BIOPSY      using Intraspinal Neurostimulator     Family History   Problem Relation Age of Onset   • Heart disease Mother    • Depression Father    • Heart disease Father    • Lung cancer Brother    • Malig Hyperthermia Neg Hx      Social History     Tobacco Use   • Smoking status: Never Smoker   • Smokeless tobacco: Never Used   Substance Use Topics   • Alcohol use: Yes     Comment: Social Drinker   • Drug use: No       (Not in a hospital admission)  Allergies:  No Known Allergies    Objective    Objective     Vital Signs  Temp:  [98 °F (36.7 °C)] 98 °F (36.7 °C)  Heart Rate:  [89-98] 96  Resp:  [18-22] 18  BP: ()/(50-83) 102/60  SpO2:  [91 %-99 %] 99 %  on   ;   Device (Oxygen Therapy): room air  Body mass index is 37.86 kg/m².    Physical Exam   Constitutional: He is oriented to person, place, and time. He appears well-developed and well-nourished. He appears distressed.   Anxious and states he is shaking from the pain in his legs.  Unkempt and chronically ill-appearing   HENT:   Head: Normocephalic and atraumatic.   Mouth/Throat: Oropharynx is clear and moist.   Eyes: Conjunctivae and EOM are normal. No scleral icterus.   Neck: Normal range of motion. No tracheal deviation present.   Cardiovascular: Normal rate, regular rhythm, normal heart sounds and intact distal pulses.   Pulmonary/Chest: Effort normal. No accessory muscle usage. No tachypnea. No respiratory distress. He has decreased breath sounds.    94-98% on room air while speaking   Abdominal: Soft. Bowel sounds are normal. He exhibits no distension and no mass. There is no tenderness. There is no rebound and no guarding.   Musculoskeletal: Normal range of motion. He exhibits edema. He exhibits no tenderness.   +1 bilateral lower extremity edema   Neurological: He is alert and oriented to person, place, and time. No cranial nerve deficit.   Skin: Skin is warm and dry. Capillary refill takes less than 2 seconds. He is not diaphoretic.   Psychiatric: He has a normal mood and affect. Judgment normal.   Nursing note and vitals reviewed.      Results Review:  I reviewed the patient's new clinical results.  I reviewed the patient's new imaging results and agree with the interpretation.  I reviewed the patient's other test results and agree with the interpretation  I personally viewed and interpreted the patient's EKG/Telemetry data  Discussed with ED provider.    Lab Results (last 24 hours)     Procedure Component Value Units Date/Time    CBC & Differential [[id number]] Collected:  04/06/19 1200    Specimen:  Blood Updated:  04/06/19 1212    Narrative:       The following orders were created for panel order CBC & Differential.  Procedure                               Abnormality         Status                     ---------                               -----------         ------                     CBC Auto Differential[[id number]]        Abnormal            Final result                 Please view results for these tests on the individual orders.    Comprehensive Metabolic Panel [[id number]]  (Abnormal) Collected:  04/06/19 1200    Specimen:  Blood Updated:  04/06/19 1236     Glucose 122 mg/dL      BUN 28 mg/dL      Creatinine 1.55 mg/dL      Sodium 140 mmol/L      Potassium 4.0 mmol/L      Chloride 100 mmol/L      CO2 22.7 mmol/L      Calcium 9.6 mg/dL      Total Protein 7.0 g/dL      Albumin 3.60 g/dL      ALT (SGPT) 25 U/L      AST (SGOT) 22 U/L      Alkaline  Phosphatase 60 U/L      Total Bilirubin 0.3 mg/dL      eGFR Non African Amer 45 mL/min/1.73      Globulin 3.4 gm/dL      A/G Ratio 1.1 g/dL      BUN/Creatinine Ratio 18.1     Anion Gap 17.3 mmol/L     Narrative:       GFR Normal >60  Chronic Kidney Disease <60  Kidney Failure <15    Phenytoin Level, Total [[id number]]  (Normal) Collected:  04/06/19 1200    Specimen:  Blood Updated:  04/06/19 1236     Phenytoin Level 10.9 mcg/mL     CBC Auto Differential [[id number]]  (Abnormal) Collected:  04/06/19 1200    Specimen:  Blood Updated:  04/06/19 1212     WBC 9.22 10*3/mm3      RBC 3.29 10*6/mm3      Hemoglobin 10.3 g/dL      Hematocrit 32.2 %      MCV 97.9 fL      MCH 31.3 pg      MCHC 32.0 g/dL      RDW 14.2 %      RDW-SD 50.9 fl      MPV 9.6 fL      Platelets 217 10*3/mm3      Neutrophil % 51.1 %      Lymphocyte % 27.7 %      Monocyte % 9.8 %      Eosinophil % 10.0 %      Basophil % 0.7 %      Immature Grans % 0.7 %      Neutrophils, Absolute 4.73 10*3/mm3      Lymphocytes, Absolute 2.55 10*3/mm3      Monocytes, Absolute 0.90 10*3/mm3      Eosinophils, Absolute 0.92 10*3/mm3      Basophils, Absolute 0.06 10*3/mm3      Immature Grans, Absolute 0.06 10*3/mm3      nRBC 0.0 /100 WBC     BNP [[id number]]  (Normal) Collected:  04/06/19 1200    Specimen:  Blood Updated:  04/06/19 1233     proBNP 423.1 pg/mL     Narrative:       Among patients with dyspnea, NT-proBNP is highly sensitive for the detection of acute congestive heart failure. In addition NT-proBNP of <300 pg/ml effectively rules out acute congestive heart failure with 99% negative predictive value.    Troponin [[id number]]  (Normal) Collected:  04/06/19 1200    Specimen:  Blood Updated:  04/06/19 1236     Troponin T 0.015 ng/mL     Narrative:       Troponin T Reference Range:  <= 0.03 ng/mL-   Negative for AMI  >0.03 ng/mL-     Abnormal for myocardial necrosis.  Clinicians would have to utilize clinical acumen, EKG, Troponin and serial changes to determine if it is  "an Acute Myocardial Infarction or myocardial injury due to an underlying chronic condition.     Ethanol [[id number]] Collected:  04/06/19 1200    Specimen:  Blood Updated:  04/06/19 1415     Ethanol <10 mg/dL      Ethanol % <0.010 %     Procalcitonin [[id number]]  (Normal) Collected:  04/06/19 1200    Specimen:  Blood Updated:  04/06/19 1559     Procalcitonin 0.18 ng/mL     Narrative:       As a Marker for Sepsis (Non-Neonates):   1. <0.5 ng/mL represents a low risk of severe sepsis and/or septic shock.  1. >2 ng/mL represents a high risk of severe sepsis and/or septic shock.    As a Marker for Lower Respiratory Tract Infections that require antibiotic therapy:  PCT on Admission     Antibiotic Therapy             6-12 Hrs later  > 0.5                Strongly Recommended            >0.25 - <0.5         Recommended  0.1 - 0.25           Discouraged                   Remeasure/reassess PCT  <0.1                 Strongly Discouraged          Remeasure/reassess PCT      As 28 day mortality risk marker: \"Change in Procalcitonin Result\" (> 80 % or <=80 %) if Day 0 (or Day 1) and Day 4 values are available. Refer to http://[email or web address]-pct-[email or web address]/   Change in PCT <=80 %   A decrease of PCT levels below or equal to 80 % defines a positive change in PCT test result representing a higher risk for 28-day all-cause mortality of patients diagnosed with severe sepsis or septic shock.  Change in PCT > 80 %   A decrease of PCT levels of more than 80 % defines a negative change in PCT result representing a lower risk for 28-day all-cause mortality of patients diagnosed with severe sepsis or septic shock.                Urinalysis With Microscopic If Indicated (No Culture) - Urine, Clean Catch [[id number]]  (Normal) Collected:  04/06/19 1423    Specimen:  Urine, Clean Catch Updated:  04/06/19 1441     Color, UA Yellow     Appearance, UA Clear     pH, UA <=5.0     Specific Gravity, UA 1.020     Glucose, UA Negative     Ketones, UA " Negative     Bilirubin, UA Negative     Blood, UA Negative     Protein, UA Negative     Leuk Esterase, UA Negative     Nitrite, UA Negative     Urobilinogen, UA 0.2 E.U./dL    Narrative:       Urine microscopic not indicated.    Urine Drug Screen - Urine, Clean Catch [[id number]]  (Normal) Collected:  04/06/19 1423    Specimen:  Urine, Clean Catch Updated:  04/06/19 1525     Amphet/Methamphet, Screen Negative     Barbiturates Screen, Urine Negative     Benzodiazepine Screen, Urine Negative     Cocaine Screen, Urine Negative     Opiate Screen Negative     THC, Screen, Urine Negative     Methadone Screen, Urine Negative     Oxycodone Screen, Urine Negative    Narrative:       Negative Thresholds For Drugs Screened:     Amphetamines               500 ng/ml   Barbiturates               200 ng/ml   Benzodiazepines            100 ng/ml   Cocaine                    300 ng/ml   Methadone                  300 ng/ml   Opiates                    300 ng/ml   Oxycodone                  100 ng/ml   THC                        50 ng/ml    The Normal Value for all drugs tested is negative. This report includes final unconfirmed screening results to be used for medical treatment purposes only. Unconfirmed results must not be used for non-medical purposes such as employment or legal testing. Clinical consideration should be applied to any drug of abuse test, particulary when unconfirmed results are used.    Blood Culture - Blood, Arm, Left [[id number]] Collected:  04/06/19 1523    Specimen:  Blood from Arm, Left Updated:  04/06/19 1529    Blood Culture - Blood, Arm, Right [[id number]] Collected:  04/06/19 1523    Specimen:  Blood from Arm, Right Updated:  04/06/19 1530    Lactic Acid, Plasma [[id number]]  (Normal) Collected:  04/06/19 1523    Specimen:  Blood Updated:  04/06/19 1616     Lactate 1.5 mmol/L           Imaging Results (last 24 hours)     Procedure Component Value Units Date/Time    XR Chest 2 View [[id number]] Collected:   04/06/19 1415     Updated:  04/06/19 1419    Narrative:       XR CHEST 2 VW-  04/06/2019      HISTORY: Shortness of breath.     FINDINGS: Heart size is mildly enlarged. Lungs are underinflated with  some vascular crowding. There is elevation of the right hemidiaphragm.  No focal infiltrates are seen.       Impression:       1. Underinflation of the lungs.  2. Mild cardiomegaly.  3. No acute process identified. Chest appears unchanged from the  10/21/2018 study.     This report was finalized on 4/6/2019 2:15 PM by Dr. Frank Lebron M.D.       CT Head Without Contrast [[id number]] Collected:  04/06/19 1353     Updated:  04/06/19 1400    Narrative:       CT SCAN OF THE BRAIN WITHOUT CONTRAST     HISTORY: Confusion.     TECHNIQUE: The CT scan was performed as an emergency procedure through  the brain without contrast.      FINDINGS: There is very mild diffuse atrophy and chronic small vessel  ischemic change similar to the MRI scan dated 07/11/2011. There is no  evidence of acute intracranial hemorrhage or mass effect and the  visualized sinuses are clear.                 Radiation dose reduction techniques were utilized, including automated  exposure control and exposure modulation based on body size.     This report was finalized on 4/6/2019 1:57 PM by Dr. Marco Adam M.D.                  ECG 12 Lead   Preliminary Result   HEART RATE= 91  bpm   RR Interval= 660  ms   NJ Interval= 163  ms   P Horizontal Axis= 21  deg   P Front Axis= 50  deg   QRSD Interval= 102  ms   QT Interval= 380  ms   QRS Axis= 27  deg   T Wave Axis= 15  deg   - ABNORMAL ECG -   Sinus rhythm   Inferior infarct, old   Electronically Signed By:    Date and Time of Study: 2019-04-06 13:28:36           Assessment/Plan     Active Hospital Problems    Diagnosis POA   • Hallucinations [R44.3] Yes       Mr. Stone is a 68 y.o. non-smoker with a  history of chronic peripheral neuropathy with history of spinal stimulator, C. difficile, DVT and PE,  hypothyroidism, HTN that presents to [facility] complaining of hallucinations.       Metabolic encephalopathy-inpatient neurology  -tsh, b12, folate  -hold lyrica    Chronic pain  -Oral pain medication as needed  -He has a pain pump    H/o PE- and had clot in 10/2019  -not currently on anticoagulation  -Check upper Rosa Dopplers low-dose anticoagulation for now    Seizures-dilantin  -no seizures in 15 years ago  -Doubt this is seizures  -Dilantin level is okay, no nystagmus on exam    Thyroidism continue Synthroid    Acute renal failure-IV fluids overnight  -Bladder scan for PVR    · I discussed the patients findings and my recommendations with patient.    VTE Prophylaxis - Lovenox 30 mg SC daily.  Code Status - Full code.       LIANA Ceja  [facility]  04/06/19  4:24 PM    Addendum: I have reviewed the history and plan as obtained by the group's APRN. I have personally examined the patient. My exam confirms her physical findings and I agree with the plan as listed above.  Patient was seen and examined a heart regular rate rhythm no murmurs rubs gallops lungs clear to auscultation bilaterally no wheezes rhonchi, abdomen soft nontender on slightly distended but patient is obese.  Patient's mental status, he answers questions mostly appropriately but is slightly confused with answers at times, he slurs his words at times.  He is got a very coarse tremor      Jus Barreto MD  04/06/2019  7:39 PM

## 2019-04-06 NOTE — ED NOTES
Nursing report ED to floor  Chava Stone Sr.  68 y.o.  male    HPI (triage note):   Chief Complaint   Patient presents with   • Leg Pain     C/O BILAT. LEG PAIN; PT WITH OF CHRONIC PAIN AND IS FOLLOWED BY A PAIN MD   • Altered Mental Status     PT REPORTS WAKING THIS AM CONFUSED, PT AA&O x4 NOW; PT RECENTLY MOVED IN WITH HIS SON AND SON REPORTS THAT HE CAN'T TAKE CARE OF HIM ANYMORE       Admitting doctor:   Rashid Banks MD    Admitting diagnosis:   The primary encounter diagnosis was Hallucinations. Diagnoses of General weakness and Leg pain, bilateral were also pertinent to this visit.    Code status:   Current Code Status     Date Active Code Status Order ID Comments User Context       Prior          Allergies:   Patient has no known allergies.    Weight:       04/06/19  1113   Weight: 113 kg (249 lb)       Most recent vitals:   Vitals:    04/06/19 1519 04/06/19 1526 04/06/19 1532 04/06/19 1543   BP:       BP Location:       Pulse: 89 90 96 96   Resp:       Temp:       TempSrc:       SpO2: 95% 96% 95% 99%   Weight:       Height:           Active LDAs/IV Access:   Lines, Drains & Airways    Active LDAs     Name:   Placement date:   Placement time:   Site:   Days:    Peripheral IV 04/06/19 1137 Right Forearm   04/06/19    1137    Forearm   less than 1                Labs (abnormal labs have a star):   Labs Reviewed   COMPREHENSIVE METABOLIC PANEL - Abnormal; Notable for the following components:       Result Value    Glucose 122 (*)     BUN 28 (*)     Creatinine 1.55 (*)     eGFR Non  Amer 45 (*)     All other components within normal limits    Narrative:     GFR Normal >60  Chronic Kidney Disease <60  Kidney Failure <15   CBC WITH AUTO DIFFERENTIAL - Abnormal; Notable for the following components:    RBC 3.29 (*)     Hemoglobin 10.3 (*)     Hematocrit 32.2 (*)     MCV 97.9 (*)     Eosinophil % 10.0 (*)     Immature Grans % 0.7 (*)     Eosinophils, Absolute 0.92 (*)     Immature Grans, Absolute 0.06  (*)     All other components within normal limits   URINALYSIS W/ MICROSCOPIC IF INDICATED (NO CULTURE) - Normal    Narrative:     Urine microscopic not indicated.   PHENYTOIN LEVEL, TOTAL - Normal   BNP (IN-HOUSE) - Normal    Narrative:     Among patients with dyspnea, NT-proBNP is highly sensitive for the detection of acute congestive heart failure. In addition NT-proBNP of <300 pg/ml effectively rules out acute congestive heart failure with 99% negative predictive value.   TROPONIN (IN-HOUSE) - Normal    Narrative:     Troponin T Reference Range:  <= 0.03 ng/mL-   Negative for AMI  >0.03 ng/mL-     Abnormal for myocardial necrosis.  Clinicians would have to utilize clinical acumen, EKG, Troponin and serial changes to determine if it is an Acute Myocardial Infarction or myocardial injury due to an underlying chronic condition.    URINE DRUG SCREEN - Normal    Narrative:     Negative Thresholds For Drugs Screened:     Amphetamines               500 ng/ml   Barbiturates               200 ng/ml   Benzodiazepines            100 ng/ml   Cocaine                    300 ng/ml   Methadone                  300 ng/ml   Opiates                    300 ng/ml   Oxycodone                  100 ng/ml   THC                        50 ng/ml    The Normal Value for all drugs tested is negative. This report includes final unconfirmed screening results to be used for medical treatment purposes only. Unconfirmed results must not be used for non-medical purposes such as employment or legal testing. Clinical consideration should be applied to any drug of abuse test, particulary when unconfirmed results are used.   PROCALCITONIN - Normal    Narrative:     As a Marker for Sepsis (Non-Neonates):   1. <0.5 ng/mL represents a low risk of severe sepsis and/or septic shock.  1. >2 ng/mL represents a high risk of severe sepsis and/or septic shock.    As a Marker for Lower Respiratory Tract Infections that require antibiotic therapy:  PCT on  "Admission     Antibiotic Therapy             6-12 Hrs later  > 0.5                Strongly Recommended            >0.25 - <0.5         Recommended  0.1 - 0.25           Discouraged                   Remeasure/reassess PCT  <0.1                 Strongly Discouraged          Remeasure/reassess PCT      As 28 day mortality risk marker: \"Change in Procalcitonin Result\" (> 80 % or <=80 %) if Day 0 (or Day 1) and Day 4 values are available. Refer to http://www.[facility] for a Causepct-calculator.com/   Change in PCT <=80 %   A decrease of PCT levels below or equal to 80 % defines a positive change in PCT test result representing a higher risk for 28-day all-cause mortality of patients diagnosed with severe sepsis or septic shock.  Change in PCT > 80 %   A decrease of PCT levels of more than 80 % defines a negative change in PCT result representing a lower risk for 28-day all-cause mortality of patients diagnosed with severe sepsis or septic shock.               BLOOD CULTURE   BLOOD CULTURE   RAINBOW DRAW    Narrative:     The following orders were created for panel order [place] Draw.  Procedure                               Abnormality         Status                     ---------                               -----------         ------                     Light Blue Top[[id number]]                                   Final result               Green Top (Gel)[[id number]]                                  Final result               Lavender Top[[id number]]                                     Final result               Gold Top - SST[[id number]]                                   Final result                 Please view results for these tests on the individual orders.   ETHANOL   LACTIC ACID, PLASMA   CBC AND DIFFERENTIAL    Narrative:     The following orders were created for panel order CBC & Differential.  Procedure                               Abnormality         Status                     ---------                               " -----------         ------                     CBC Auto Differential[[id number]]        Abnormal            Final result                 Please view results for these tests on the individual orders.   LIGHT BLUE TOP   GREEN TOP   LAVENDER TOP   GOLD TOP - [facility]       EKG:   ECG 12 Lead   Preliminary Result   HEART RATE= 91  bpm   RR Interval= 660  ms   IN Interval= 163  ms   P Horizontal Axis= 21  deg   P Front Axis= 50  deg   QRSD Interval= 102  ms   QT Interval= 380  ms   QRS Axis= 27  deg   T Wave Axis= 15  deg   - ABNORMAL ECG -   Sinus rhythm   Inferior infarct, old   Electronically Signed By:    Date and Time of Study: 2019-04-06 13:28:36          Meds given in ED:   Medications   sodium chloride 0.9 % flush 10 mL (not administered)   sodium chloride 0.9 % bolus 500 mL (500 mL Intravenous New Bag 4/6/19 1550)   morphine injection 4 mg (4 mg Intravenous Given 4/6/19 1220)   ondansetron (ZOFRAN) injection 4 mg (4 mg Intravenous Given 4/6/19 1220)   sodium chloride 0.9 % bolus 500 mL (0 mL Intravenous Stopped 4/6/19 1532)       Imaging results:  Xr Chest 2 View    Result Date: 4/6/2019  1. Underinflation of the lungs. 2. Mild cardiomegaly. 3. No acute process identified. Chest appears unchanged from the 10/21/2018 study.  This report was finalized on 4/6/2019 2:15 PM by Dr. Frank Lebron M.D.        Ambulatory status:   - bedrest    Social issues:   Social History     Socioeconomic History   • Marital status:      Spouse name: Not on file   • Number of children: Not on file   • Years of education: 3 years College   • Highest education level: Not on file   Occupational History   • Occupation: Manager     Employer: [employer]   Tobacco Use   • Smoking status: Never Smoker   • Smokeless tobacco: Never Used   Substance and Sexual Activity   • Alcohol use: Yes     Comment: Social Drinker   • Drug use: No        Oscar Ramey, RN  04/06/19 6315

## 2019-04-06 NOTE — ED PROVIDER NOTES
" EMERGENCY DEPARTMENT ENCOUNTER    CHIEF COMPLAINT  Chief Complaint: confusion  History given by: patient  History limited by: none  Room Number: 27/27  PMD: Gordy Gupta MD      HPI:  Pt is a 68 y.o. male who presents complaining of confusion and visual hallucinations that started this morning around 0700 when pt woke up and did not recognize his surroundings. He states that saw \"the ceiling fan on the wall\" and the \"windows on the ceiling\". Pt states that he was normal last night when he went to bed, which was sometime after midnight. Pt also c/o worsening of chronic, \"sharp\" BLE pain that started a few days ago, sore throat, and intermittent diarrhea for the last 2 weeks. Pt denies weakness and CP. Pt states that his son recently moved into his house and the son reportedly feels that he can no longer adequately take care of the pt. Pt denies any recent falls. Pt states that he cannot ambulate around the house and reports that he gets around with the assistance of a wheelchair. Pt states he has hx of DVT in the lower extremity, smoking, and peripheral neuropathy. Pt states he has not had a drink in the last 6 months. Pt states that he is currently taking lyrica and cymbalta. During the physical exam, when the pt was rolled over, the pt became tachypneic and complained of SOA.     Duration/Onset/Timing: since 0700 this morning/gradual/constant  Location: n/a  Radiation: n/a  Quality: confusion  Intensity/Severity: moderate  Associated Symptoms: worsening of chronic, \"sharp\" BLE pain that started a few days ago, sore throat, and intermittent diarrhea for the last 2 weeks, visual hallucinations  Aggravating or Alleviating Factors: none  Previous Episodes: none      PAST MEDICAL HISTORY  Active Ambulatory Problems     Diagnosis Date Noted   • Spinal stenosis of lumbar region 02/29/2016   • Lumbar radiculopathy 02/29/2016   • Spinal cord stimulator status 02/29/2016   • Pain in both lower extremities 02/29/2016   • " Chronic pain 02/29/2016   • Postlaminectomy syndrome of lumbar region 10/07/2016   • Benign essential hypertension 12/12/2016   • Colon polyp 12/12/2016   • Diverticulosis of large intestine 12/12/2016   • Elevated prostate specific antigen (PSA) 12/12/2016   • Psychomotor epilepsy (CMS/HCC) 12/12/2016   • Hyperprolactinemia (CMS/HCC) 12/12/2016   • Hypogonadism in male 12/12/2016   • Acquired hypothyroidism 12/12/2016   • Malignant neoplasm of prostate (CMS/HCC) 12/12/2016   • Solitary pulmonary nodule 12/12/2016   • Vitamin D deficiency 12/12/2016   • Battery end of life of spinal cord stimulator 08/15/2018   • Weakness 10/21/2018   • Peripheral neuropathy 10/22/2018   • Diarrhea 10/22/2018   • C. difficile colitis 10/26/2018   • Pulmonary embolus (CMS/HCC) 10/26/2018   • Acute deep vein thrombosis (DVT) of distal end of right lower extremity (CMS/HCC) 10/30/2018     Resolved Ambulatory Problems     Diagnosis Date Noted   • MYRIAM (acute kidney injury) (CMS/HCC) 10/22/2018   • Dehydration 10/22/2018   • Hypopotassemia 10/25/2018     Past Medical History:   Diagnosis Date   • Anemia    • Arthritis    • At risk for sleep apnea    • Chronic pain    • Diverticulosis of sigmoid colon 07/14/2011   • DVT (deep venous thrombosis) (CMS/[facility])    • Epilepsy (CMS/[facility])    • History of solitary pulmonary nodule    • History of vitamin D deficiency    • Hyperprolactinemia (CMS/HCC) 2011   • Hypertension    • Hypogonadism in male    • Hypothyroidism    • Injury of back    • Leg pain    • Low back pain    • Lumbar canal stenosis    • Lumbar radiculopathy    • Lytic bone lesions on xray 2018   • Peripheral neuropathy    • Pulmonary embolism (CMS/HCC)        PAST SURGICAL HISTORY  Past Surgical History:   Procedure Laterality Date   • COLONOSCOPY  2011    1 polyp a 18 cm proximal to anus; sigmoid diverticulosis   • EPIDURAL BLOCK     • HAND SURGERY Right    • PROSTATE SURGERY      Biopsy, benign   • SPINAL CORD STIMULATOR IMPLANT     •  "SPINAL CORD STIMULATOR IMPLANT N/A 2018    Procedure: Gifford Sci- Spinal Cord Stimulator revision--battery ;  Surgeon: Ced Castro MD;  Location: [facility];  Service: Pain Management   • THYROID BIOPSY      using Intraspinal Neurostimulator       FAMILY HISTORY  Family History   Problem Relation Age of Onset   • Heart disease Mother    • Depression Father    • Heart disease Father    • Lung cancer Brother    • Malig Hyperthermia Neg Hx        SOCIAL HISTORY  Social History     Socioeconomic History   • Marital status:      Spouse name: Not on file   • Number of children: Not on file   • Years of education: 3 years College   • Highest education level: Not on file   Occupational History   • Occupation: Manager     Employer: [employer]   Tobacco Use   • Smoking status: Never Smoker   • Smokeless tobacco: Never Used   Substance and Sexual Activity   • Alcohol use: Yes     Comment: Social Drinker   • Drug use: No       ALLERGIES  Patient has no known allergies.    REVIEW OF SYSTEMS  Review of Systems   Constitutional: Negative for fatigue and fever.   HENT: Positive for sore throat. Negative for congestion.    Eyes: Negative for visual disturbance.   Respiratory: Positive for shortness of breath ( here in the ER when the pt was rolled over). Negative for cough and wheezing.    Cardiovascular: Negative for chest pain.   Gastrointestinal: Positive for diarrhea ( intermittent for the last 2 weeks). Negative for abdominal pain, nausea and vomiting.   Genitourinary: Negative for dysuria, frequency and urgency.   Musculoskeletal: Positive for myalgias ( worsening of chronic, \"sharp\" BLE pain that started a few days ago ). Negative for arthralgias and back pain.   Skin: Negative for rash.   Neurological: Negative for dizziness, syncope, weakness and headaches.   Psychiatric/Behavioral: Positive for confusion ( since 0700 this morning) and hallucinations ( visual). Negative for " self-injury. The patient is not nervous/anxious.        PHYSICAL EXAM  ED Triage Vitals [04/06/19 1101]   Temp Heart Rate Resp BP SpO2   98 °F (36.7 °C) 98 22 139/64 96 %      Temp src Heart Rate Source Patient Position BP Location FiO2 (%)   Tympanic -- -- -- --       Physical Exam   Constitutional: He is oriented to person, place, and time and well-developed, well-nourished, and in no distress.  Non-toxic appearance. No distress.   Pt is dirty and unkempt. There are dried feces and excoriation of the skin to the buttock and perineum.   HENT:   Head: Normocephalic and atraumatic.   Right Ear: Tympanic membrane normal.   Left Ear: Tympanic membrane normal.   Nose: Nose normal.   Mouth/Throat: Uvula is midline, oropharynx is clear and moist and mucous membranes are normal.   Eyes: Conjunctivae, EOM and lids are normal. Pupils are equal, round, and reactive to light.   Cardiovascular: Normal rate and regular rhythm.   Pulmonary/Chest: Effort normal and breath sounds normal.   Abdominal: Soft. Normal appearance. There is no tenderness.   Neurological: He is alert and oriented to person, place, and time.   Skin: Skin is warm, dry and intact.   Psychiatric: Mood, memory, affect and judgment normal.       LAB RESULTS  Lab Results (last 24 hours)     Procedure Component Value Units Date/Time    CBC & Differential [[id number]] Collected:  04/06/19 1200    Specimen:  Blood Updated:  04/06/19 1212    Narrative:       The following orders were created for panel order CBC & Differential.  Procedure                               Abnormality         Status                     ---------                               -----------         ------                     CBC Auto Differential[[id number]]        Abnormal            Final result                 Please view results for these tests on the individual orders.    Comprehensive Metabolic Panel [[id number]]  (Abnormal) Collected:  04/06/19 1200    Specimen:  Blood Updated:  04/06/19  1236     Glucose 122 mg/dL      BUN 28 mg/dL      Creatinine 1.55 mg/dL      Sodium 140 mmol/L      Potassium 4.0 mmol/L      Chloride 100 mmol/L      CO2 22.7 mmol/L      Calcium 9.6 mg/dL      Total Protein 7.0 g/dL      Albumin 3.60 g/dL      ALT (SGPT) 25 U/L      AST (SGOT) 22 U/L      Alkaline Phosphatase 60 U/L      Total Bilirubin 0.3 mg/dL      eGFR Non African Amer 45 mL/min/1.73      Globulin 3.4 gm/dL      A/G Ratio 1.1 g/dL      BUN/Creatinine Ratio 18.1     Anion Gap 17.3 mmol/L     Narrative:       GFR Normal >60  Chronic Kidney Disease <60  Kidney Failure <15    Phenytoin Level, Total [[id number]]  (Normal) Collected:  04/06/19 1200    Specimen:  Blood Updated:  04/06/19 1236     Phenytoin Level 10.9 mcg/mL     CBC Auto Differential [[id number]]  (Abnormal) Collected:  04/06/19 1200    Specimen:  Blood Updated:  04/06/19 1212     WBC 9.22 10*3/mm3      RBC 3.29 10*6/mm3      Hemoglobin 10.3 g/dL      Hematocrit 32.2 %      MCV 97.9 fL      MCH 31.3 pg      MCHC 32.0 g/dL      RDW 14.2 %      RDW-SD 50.9 fl      MPV 9.6 fL      Platelets 217 10*3/mm3      Neutrophil % 51.1 %      Lymphocyte % 27.7 %      Monocyte % 9.8 %      Eosinophil % 10.0 %      Basophil % 0.7 %      Immature Grans % 0.7 %      Neutrophils, Absolute 4.73 10*3/mm3      Lymphocytes, Absolute 2.55 10*3/mm3      Monocytes, Absolute 0.90 10*3/mm3      Eosinophils, Absolute 0.92 10*3/mm3      Basophils, Absolute 0.06 10*3/mm3      Immature Grans, Absolute 0.06 10*3/mm3      nRBC 0.0 /100 WBC     BNP [[id number]]  (Normal) Collected:  04/06/19 1200    Specimen:  Blood Updated:  04/06/19 1233     proBNP 423.1 pg/mL     Narrative:       Among patients with dyspnea, NT-proBNP is highly sensitive for the detection of acute congestive heart failure. In addition NT-proBNP of <300 pg/ml effectively rules out acute congestive heart failure with 99% negative predictive value.    Troponin [[id number]]  (Normal) Collected:  04/06/19 1200    Specimen:   Blood Updated:  04/06/19 1236     Troponin T 0.015 ng/mL     Narrative:       Troponin T Reference Range:  <= 0.03 ng/mL-   Negative for AMI  >0.03 ng/mL-     Abnormal for myocardial necrosis.  Clinicians would have to utilize clinical acumen, EKG, Troponin and serial changes to determine if it is an Acute Myocardial Infarction or myocardial injury due to an underlying chronic condition.     Ethanol [[id number]] Collected:  04/06/19 1200    Specimen:  Blood Updated:  04/06/19 1415     Ethanol <10 mg/dL      Ethanol % <0.010 %     Urinalysis With Microscopic If Indicated (No Culture) - Urine, Clean Catch [[id number]]  (Normal) Collected:  04/06/19 1423    Specimen:  Urine, Clean Catch Updated:  04/06/19 1441     Color, UA Yellow     Appearance, UA Clear     pH, UA <=5.0     Specific Gravity, UA 1.020     Glucose, UA Negative     Ketones, UA Negative     Bilirubin, UA Negative     Blood, UA Negative     Protein, UA Negative     Leuk Esterase, UA Negative     Nitrite, UA Negative     Urobilinogen, UA 0.2 E.U./dL    Narrative:       Urine microscopic not indicated.    Urine Drug Screen - Urine, Clean Catch [[id number]]  (Normal) Collected:  04/06/19 1423    Specimen:  Urine, Clean Catch Updated:  04/06/19 1525     Amphet/Methamphet, Screen Negative     Barbiturates Screen, Urine Negative     Benzodiazepine Screen, Urine Negative     Cocaine Screen, Urine Negative     Opiate Screen Negative     THC, Screen, Urine Negative     Methadone Screen, Urine Negative     Oxycodone Screen, Urine Negative    Narrative:       Negative Thresholds For Drugs Screened:     Amphetamines               500 ng/ml   Barbiturates               200 ng/ml   Benzodiazepines            100 ng/ml   Cocaine                    300 ng/ml   Methadone                  300 ng/ml   Opiates                    300 ng/ml   Oxycodone                  100 ng/ml   THC                        50 ng/ml    The Normal Value for all drugs tested is negative. This  report includes final unconfirmed screening results to be used for medical treatment purposes only. Unconfirmed results must not be used for non-medical purposes such as employment or legal testing. Clinical consideration should be applied to any drug of abuse test, particulary when unconfirmed results are used.          I ordered the above labs and reviewed the results    RADIOLOGY  XR Chest 2 View   Final Result   1. Underinflation of the lungs.   2. Mild cardiomegaly.   3. No acute process identified. Chest appears unchanged from the   10/21/2018 study.       This report was finalized on 4/6/2019 2:15 PM by Dr. Frank Lebron M.D.          CT Head Without Contrast   Final Result   FINDINGS: There is very mild diffuse atrophy and chronic small vessel  ischemic change similar to the MRI scan dated 07/11/2011. There is no  evidence of acute intracranial hemorrhage or mass effect and the  visualized sinuses are clear.      CT Head- negative acute  I ordered the above noted radiological studies. Interpreted by radiologist. Discussed with radiologist Dr. Adam Reviewed by me in PACS.       PROCEDURES  Procedures  EKG interpreted by ER Physician. See his note for interpretation.     PROGRESS AND CONSULTS    1155 CT Head, CXR, Troponin, EKG, Doppler US LLE, and Labs ordered for further evaluation.     1216 Zofran and Morphine ordered for pain management.     1348 Reviewed pt's history and workup with Dr. Wheat (ER physician).  After a bedside evaluation, Dr. Wheat agrees with the plan of care.    1349 Labs ordered for further evaluation.     1355 I received a call from the [email or web address]. The pt's Doppler US LLE shows evidence of a chronic blood clot in the LLE that is not new, nothing else acute.    1427 Rechecked pt. Pt is resting comfortably. Pt's daughter is now present. Pt's daughter states that the pt's hallucinations he experienced this morning are new. Notified pt that his Doppler US LLE showed evidence of  a chronic blood clot that is not new. I told the pt that his lab results are unremarkable. The pt would like to move into a NH. I told the pt that he may not end up staying in the hospital for 3 days, which would be necessary for his insurance to cover admission to a NH. Discussed the plan to admit the pt for further evaluation of his hallucinations. Pt understands and agrees with the plan, all questions answered.    1438 Call placed to [facility].    1500 I spoke with Sangeeta Kenney (NP for [facility]) here in the ER. She agrees with the plan to admit the pt. PT BP is low according to nurse. 80s/50s. IVF ordered. Added on Lactic acid, BC, and procalitonin. Discussed with Sangeeta. Will repeat BP after fluid bolus.         MEDICAL DECISION MAKING  Results were reviewed/discussed with the patient and they were also made aware of online access. Pt also made aware that some labs, such as cultures, will not be resulted during ER visit and follow up with PMD is necessary.     MDM  Number of Diagnoses or Management Options     Amount and/or Complexity of Data Reviewed  Clinical lab tests: ordered and reviewed (HGB - 10.3)  Tests in the radiology section of CPT®: ordered and reviewed (CT Head - negative acute)  Tests in the medicine section of CPT®: ordered and reviewed (EKG - see ER Physician's note for interpretation)  Discussion of test results with the performing providers: yes (Dr. Adam (Radiologist))  Discuss the patient with other providers: yes (Sangeeta Kenney (NP for [facility]))  Independent visualization of images, tracings, or specimens: yes           DIAGNOSIS  Final diagnoses:   Hallucinations   General weakness   Leg pain, bilateral       DISPOSITION  ADMISSION    Discussed treatment plan and reason for admission with pt/family and admitting physician.  Pt/family voiced understanding of the plan for admission for further testing/treatment as needed.         Latest Documented Vital Signs:  As of 3:26 PM  BP- 102/60 HR-  98 Temp- 98 °F (36.7 °C) (Tympanic) O2 sat- 91%    --  Documentation assistance provided by britney Sarah for Tiara Meraz (APRN).  Information recorded by the [email or web address] was done at my direction and has been verified and validated by me.            Marito Sarah  04/06/19 1520       Tiara Meraz APRN  04/06/19 1526

## 2019-04-06 NOTE — PLAN OF CARE
Problem: Patient Care Overview  Goal: Plan of Care Review  Outcome: Ongoing (interventions implemented as appropriate)   04/06/19 2072   Coping/Psychosocial   Plan of Care Reviewed With patient   Plan of Care Review   Progress no change   OTHER   Outcome Summary VSS, Pt got to floor around 1730 from ER. Pt was scream out in pain and was anxious and hostile to staff. Screaming that he needed pain meds right now and he was not going to answer any question until he received a pain shot. Had charge nurse speak with him and he calmed down alittle. It was reported that patients family is not able to help in his care any longer. Pt is bed bound. Can use wheelchair at times. Pt is very diaphoretic, bs is normal. Pt legs are stiff and unable to bend. Pt has hand tremors which is normal for him. Pt has spinal stimulator. Will continue to monitor.

## 2019-04-06 NOTE — ED NOTES
Tried to do EKG.Pt in much pain. APRN informed      Marcos Balderrama  04/06/19 1247       Marcos Balderrama  04/06/19 1244

## 2019-04-07 NOTE — PROGRESS NOTES
[facility]     LOS: 0 days     Subjective:    CC:Leg Pain (C/O BILAT. LEG PAIN; PT WITH OF CHRONIC PAIN AND IS FOLLOWED BY A PAIN MD) and Altered Mental Status (PT REPORTS WAKING THIS AM CONFUSED, PT AA&O x4 NOW; PT RECENTLY MOVED IN WITH HIS SON AND SON REPORTS THAT HE CAN'T TAKE CARE OF HIM ANYMORE)    DIET:  Diet Order   Procedures   • Diet Regular     No cp  No soa  Tolerating po  Chronic severe back pain      Objective:    Vital Signs:  Temp:  [97.4 °F (36.3 °C)-98.4 °F (36.9 °C)] 98.1 °F (36.7 °C)  Heart Rate:  [] 91  Resp:  [20-24] 20  BP: ()/(60-80) 119/65    SpO2:  [91 %-99 %] 97 %  on  Flow (L/min):  [2] 2;   Device (Oxygen Therapy): nasal cannula  Body mass index is 38.77 kg/m².    Physical Exam   Constitutional: He appears well-developed and well-nourished. He appears distressed.   HENT:   Head: Normocephalic and atraumatic.   Cardiovascular: Exam reveals no friction rub.   No murmur heard.  Pulmonary/Chest: Effort normal and breath sounds normal.   Abdominal: Soft. Bowel sounds are normal. He exhibits no distension. There is no tenderness.   Neurological: He is alert.   Unable to answer questions at time because of pain   Skin: Skin is warm and dry.       Results Review:    Glucose   Date Value Ref Range Status   04/07/2019 83 65 - 99 mg/dL Final   04/06/2019 122 (H) 65 - 99 mg/dL Final     Results from last 7 days   Lab Units 04/07/19  0505   WBC 10*3/mm3 7.77   HEMOGLOBIN g/dL 9.9*   HEMATOCRIT % 31.4*   PLATELETS 10*3/mm3 199     Results from last 7 days   Lab Units 04/07/19  0505 04/06/19  1200   SODIUM mmol/L 141 140   POTASSIUM mmol/L 4.5 4.0   CHLORIDE mmol/L 104 100   CO2 mmol/L 20.3* 22.7   BUN mg/dL 29* 28*   CREATININE mg/dL 1.62* 1.55*   CALCIUM mg/dL 9.0 9.6   BILIRUBIN mg/dL  --  0.3   ALK PHOS U/L  --  60   ALT (SGPT) U/L  --  25   AST (SGOT) U/L  --  22   GLUCOSE mg/dL 83 122*             Results from last 7 days   Lab Units 04/06/19 2009  04/06/19  1200   TROPONIN T ng/mL 0.016 0.015     Cultures:  Blood Culture   Date Value Ref Range Status   04/06/2019 No growth at 24 hours  Preliminary   04/06/2019 No growth at less than 24 hours  Preliminary       I have reviewed daily medications and changes in CPOE    Scheduled meds    aspirin 650 mg Oral Daily   DULoxetine 120 mg Oral Nightly   enoxaparin 1 mg/kg Subcutaneous Q24H   finasteride 5 mg Oral Daily   levothyroxine 88 mcg Oral Q AM   phenytoin 500 mg Oral Nightly   sodium chloride 3 mL Intravenous Q12H   thiamine (VITAMIN B1) IVPB 100 mg Intravenous Daily          PRN meds  •  acetaminophen  •  HYDROcodone-acetaminophen  •  [COMPLETED] Insert peripheral IV **AND** sodium chloride  •  sodium chloride        Lumbar radiculopathy    Acquired hypothyroidism    Hallucinations    Hx pulmonary embolism    Seizure disorder (CMS/HCC)        Assessment/Plan:    Metabolic encephalopathy-inpatient neurology  -tsh 5.28, b12 335, folate 18  -hold lyrica     Chronic pain  -Oral pain medication as needed  -He has a pain pump     H/o PE- and had clot in 10/2019  -not currently on anticoagulation  -Check upper Lower Dopplers, full-dose anticoagulation for now     Seizures-dilantin  -no seizures in 15 years ago  -Doubt this is seizures causing his admission  -Dilantin level is okay, no nystagmus on exam     hypothyroidism continue Synthroid     Acute renal failure-IV fluids overnight  -high PVR  -place danielle and see if the renal function improves    DVT PPX: full dose lovenox      Jus Barreto MD  04/07/19  3:42 PM

## 2019-04-07 NOTE — PLAN OF CARE
Problem: Patient Care Overview  Goal: Plan of Care Review  Outcome: Ongoing (interventions implemented as appropriate)   04/07/19 1814   Coping/Psychosocial   Plan of Care Reviewed With patient;family   Plan of Care Review   Progress no change   OTHER   Outcome Summary Pt A+Ox4 this shift, pt continues to have pain to lower back and BLE, pt had US of BUE results negative for DVT, lovenox dose increased today, 16F f/c inserted r/t retention, PIV site changed to LFA and is now saline locked, pt continues to have tremors, several MD consults order today, pt remains on 2 liters oxygen via nasal cannula, plan to have pulse oximetry study tonight, plan for CT of abd and pelvis     Goal: Interprofessional Rounds/Family Conf  Outcome: Ongoing (interventions implemented as appropriate)      Problem: Fall Risk (Adult)  Goal: Identify Related Risk Factors and Signs and Symptoms  Outcome: Outcome(s) achieved Date Met: 04/07/19    Goal: Absence of Fall  Outcome: Ongoing (interventions implemented as appropriate)      Problem: Skin Injury Risk (Adult)  Goal: Identify Related Risk Factors and Signs and Symptoms  Outcome: Outcome(s) achieved Date Met: 04/07/19    Goal: Skin Health and Integrity  Outcome: Ongoing (interventions implemented as appropriate)

## 2019-04-07 NOTE — PLAN OF CARE
Problem: Patient Care Overview  Goal: Plan of Care Review  Outcome: Ongoing (interventions implemented as appropriate)   04/07/19 0359   Coping/Psychosocial   Plan of Care Reviewed With patient   Plan of Care Review   Progress no change   OTHER   Outcome Summary Pt's pain not well controlled w/Norco. Helps for maybe an hour and then pt is in severe pain again. Paged on call MD who restarted lyrica then read Dr. Barreto's note which said to hold lyrica. Paged back and it was d/c'd d/t note. Pt retaining urine. So far straight cath x1 this shift and may need to again before I leave. Pt bottom is red w/ excoriation, blisters, and what looks like MASD. Placed consult for wound to come see. D/O to situation and place at times.  VSS. Afebrile.      Goal: Individualization and Mutuality  Outcome: Ongoing (interventions implemented as appropriate)    Goal: Discharge Needs Assessment  Outcome: Ongoing (interventions implemented as appropriate)    Goal: Interprofessional Rounds/Family Conf  Outcome: Ongoing (interventions implemented as appropriate)      Problem: Fall Risk (Adult)  Goal: Identify Related Risk Factors and Signs and Symptoms  Outcome: Ongoing (interventions implemented as appropriate)    Goal: Absence of Fall  Outcome: Ongoing (interventions implemented as appropriate)      Problem: Skin Injury Risk (Adult)  Goal: Identify Related Risk Factors and Signs and Symptoms  Outcome: Ongoing (interventions implemented as appropriate)    Goal: Skin Health and Integrity  Outcome: Ongoing (interventions implemented as appropriate)

## 2019-04-07 NOTE — CONSULTS
CC: Confusion and hallucinations    HPI:  Chava Stone Sr. is a  68 y.o. right-handed white male who I was asked to see in neurologic consultation by Dr. Barreto regarding altered mental status and visual hallucinations.  Initially the patient was alone in his room for the evaluation but later his daughter arrived and I asked her a few questions also.  The patient has a background of chronic pain in his legs.  He is treated by Dr. Puneet Castro and has a spinal stimulator.  He is on Cymbalta.  He was previously treated with Lyrica but has been off of Lyrica several months.  He is not on narcotic analgesics.  He takes Tylenol.  He apparently had a battery upgrade to his stimulator in 2017.  He has not returned to pain management lately according to his daughter.  4 days ago when she spoke with him by phone he was confused.  He recalls confusion and visual hallucinations over the last few days.  He states that he saw children but did not speak.  He saw a ceiling fan on the wall by a fireplace which he does not have a fireplace.  He states that he did not take any additional medications.  He is not on narcotic analgesics.  He denies alcohol use and smoking.  He describes an increase in his lower extremity pain with sharp pains primarily in the knees hips and ankles.  He has a diagnosis of peripheral neuropathy of undetermined origin.  He had an EMG 3/2016 by Dr. Hidalgo showing low amplitude peroneal motor studies and right tibial motor study without needle exam changes in muscles.    Laboratory evaluation showed BUN 28 and creatinine 1.55.  This is elevated compared to previous study recently.  Blood sugar was 122.  Hemoglobin and hematocrit were 10.3 and 32 respectively.  Urine drug screen was negative.  There apparently is some question of urinary retention.  There is charted history of prostate cancer but patient's daughter states this was never proven a recent PSA was normal in October when he was evaluated for  some bone lesions on CT scan of the spine.  Protein electrophoresis was negative for an M spike in addition.  She is unsure and so is he about anything further regarding that.    He denies history of head or spine trauma meningitis.  He has history of seizures since age 8 treated with Dilantin.  He states he has been seizure-free for 15 years.  No history of stroke or syncope..  8-10 he had previous pulmonary embolism.  He has history of chronic DVT right calf there is positive history of stroke in patient's mother.  Denied a family history of Parkinson's disease or tremor disorder.    The patient appears tremulous-patient states that he has a tiny bit of tremor usually but it is much worse in the past few days.  His daughter states that the last time she saw him 3 weeks ago he had no tremor.    The patient was hospitalized in 10/2019 with C. difficile and was hospitalized about 3 weeks and went to rehab.  She adds that the patient was able to ambulate with a walker at that time.  He did not have physical therapy follow-up with home therapy and he has not done any exercise that she states.  Patient was seen at that time Dr. Garcia for neurology and followed up by Denise Mayen NP and Dr. Oz Jeronimo.  There was concern for myelopathy but CT scans of the neuro axis did not demonstrate a source.      Further labs this hospitalization demonstrate normal B12 and folate.  TSH is a little elevated at 5.2.  He is on levothyroxine for hypothyroidism.    Past Medical History:   Diagnosis Date   • Anemia    • Arthritis    • At risk for sleep apnea     STOP BANG 5   • Chronic pain    • Diverticulosis of sigmoid colon 07/14/2011   • DVT (deep venous thrombosis) (CMS/HCC)    • Epilepsy (CMS/HCC)     Since childhood   • History of solitary pulmonary nodule    • History of vitamin D deficiency    • Hyperprolactinemia (CMS/HCC) 2011   • Hypertension    • Hypogonadism in male    • Hypothyroidism    • Injury of back    • Leg  pain    • Low back pain     DDD   • Lumbar canal stenosis    • Lumbar radiculopathy    • Lytic bone lesions on xray 2018   • Peripheral neuropathy    • Pulmonary embolism (CMS/HCC)          Past Surgical History:   Procedure Laterality Date   • COLONOSCOPY      1 polyp a 18 cm proximal to anus; sigmoid diverticulosis   • EPIDURAL BLOCK     • HAND SURGERY Right    • PROSTATE SURGERY      Biopsy, benign   • SPINAL CORD STIMULATOR IMPLANT     • SPINAL CORD STIMULATOR IMPLANT N/A 2018    Procedure: [facility] [facility]- Spinal Cord Stimulator revision--battery ;  Surgeon: Ced Castro MD;  Location: [facility] OR;  Service: Pain Management   • THYROID BIOPSY      using Intraspinal Neurostimulator           Current Facility-Administered Medications:   •  acetaminophen (TYLENOL) tablet 650 mg, 650 mg, Oral, Q4H PRN, Jus Barreto MD  •  aspirin tablet 650 mg, 650 mg, Oral, Daily, Jus Barreto MD, 650 mg at 19 0937  •  DULoxetine (CYMBALTA) DR capsule 120 mg, 120 mg, Oral, Nightly, Jus Barreto MD, 120 mg at 19  •  enoxaparin (LOVENOX) syringe 40 mg, 40 mg, Subcutaneous, Q24H, Jus Barreto MD, 40 mg at 19  •  finasteride (PROSCAR) tablet 5 mg, 5 mg, Oral, Daily, Jus Barreto MD, 5 mg at 19 0937  •  HYDROcodone-acetaminophen (NORCO) 5-325 MG per tablet 1 tablet, 1 tablet, Oral, Q4H PRN, Jus Barreto MD, 1 tablet at 19 0521  •  levothyroxine (SYNTHROID, LEVOTHROID) tablet 88 mcg, 88 mcg, Oral, Q AM, Jus Barreto MD, 88 mcg at 19 0647  •  phenytoin (DILANTIN) ER capsule 500 mg, 500 mg, Oral, Nightly, Jus Barreto MD, 500 mg at 19 2254  •  [COMPLETED] Insert peripheral IV, , , Once **AND** sodium chloride 0.9 % flush 10 mL, 10 mL, Intravenous, PRN, Meraz, Tiara Brown, APRN  •  sodium chloride 0.9 % flush 3 mL, 3 mL, Intravenous, Q12H, Jus Barreto MD, 3 mL at 19 0937  •  sodium chloride 0.9 % flush 3-10  mL, 3-10 mL, Intravenous, PRN, Jus Barreto MD  •  sodium chloride 0.9 % infusion, 100 mL/hr, Intravenous, Continuous, Jus Barreto MD, Last Rate: 100 mL/hr at 04/07/19 0657, 100 mL/hr at 04/07/19 0657  •  thiamine (B-1) 100 mg in sodium chloride 0.9 % 100 mL IVPB, 100 mg, Intravenous, Daily, Jus Barreto MD, Last Rate: 200 mL/hr at 04/07/19 0936, 100 mg at 04/07/19 0936      Family History   Problem Relation Age of Onset   • Heart disease Mother    • Depression Father    • Heart disease Father    • Lung cancer Brother    • Malig Hyperthermia Neg Hx          Social History     Socioeconomic History   • Marital status:      Spouse name: Not on file   • Number of children: Not on file   • Years of education: 3 years College   • Highest education level: Not on file   Occupational History   • Occupation: Manager     Employer: [employer]   Tobacco Use   • Smoking status: Never Smoker   • Smokeless tobacco: Never Used   Substance and Sexual Activity   • Alcohol use: Yes     Comment: Social Drinker   • Drug use: No         No Known Allergies      Pain Scale: 8-10/10 with pain in legs        ROS:  Review of Systems   Constitutional: Positive for activity change basically confined to chair or bed.  Negative, appetite change and fatigue. .  HENT: Negative for rhinorrhea and trouble swallowing. Negative for ear pain, facial swelling and hearing loss.    Eyes: Positive for visual hallucinations. Negative for pain, redness and itching.   Respiratory: Negative for cough, choking and shortness of breath.    Cardiovascular: Negative for chest pain.  Chronic right leg swelling.   Gastrointestinal: Negative for abdominal pain, nausea and vomiting. Patient states he also had a bout of diarrhea a couple of weeks ago.  He denied lack of eating or drinking  Endocrine: Negative for cold intolerance and heat intolerance.   Musculoskeletal: Positive for arthralgias and gait problem.   Skin: Negative for  color change and rash.   Allergic/Immunologic: Negative for environmental allergies. Negative for food allergies.   Neurological: Positive of hands in the feet for tremors and numbness in the feet. Negative for dizziness, syncope, facial asymmetry, speech difficulty, positive for weakness and pain in the legs light-headedness and headaches.   Hematological: Negative for adenopathy. Does not bruise/bleed easily.   Psychiatric/Behavioral: Negative for agitation, behavioral.  Positive for; confusion, decreased concentration, dysphoric mood, hallucinations.  Patient appears anxious and tremulous         Physical Exam:  Vitals:    04/06/19 1932 04/06/19 2311 04/07/19 0243 04/07/19 0737   BP: 99/80 105/65  136/80   BP Location: Left arm Left arm  Right arm   Patient Position: Lying Lying  Sitting   Pulse: 93 88 90 91   Resp: 24 24  20   Temp: 97.5 °F (36.4 °C) 97.4 °F (36.3 °C)     TempSrc: Oral Oral     SpO2: 99% 96% 91% 97%   Weight: 116 kg (255 lb)      Height:         Body mass index is 38.77 kg/m².    Physical Exam  General: M obese white male with complaints of severe pain in the legs.  HEENT: Normocephalic no evidence of trauma.  Disks difficult to see.  Throat negative.  Heart: Regular rate and rhythm without murmurs.  Neck: Supple.  No thyromegaly.  No cervical bruits.  Radial pulses are strong and simultaneous.      Neurological Exam:  Mental status/higher cortical function: Awake alert conversant but his speech delivery is abrupt and rapid..  Oriented to person date and location.  He knew he was at [facility] in [facility].  Thought it was Saturday but it Sunday.  It was spring.  He named presidents Trump, Obama, Parker, Parker did not recall one between the 2 Bushes.  Immediate recall 3/3 with delayed recall 2/2.  No right left disorientation finger agnosia or ideomotor apraxia.  Spelled world backwards DLORW.  Cranial nerves: 2-12 intact  Motor: Very tremulous involving the hands  arms and even face and jaw.  This was striking to the daughter also.  With full relaxation I believe his tone is normal in the arms.  He is extremely stiff at the knee some of which may be related to severe pain in the legs.  Power testing in the upper extremities was normal.  In the lower extremities the examination is very limited because of patient complaints of pain.  There is probably weakness at toe extension which is reliable.  There is some intrinsic foot muscle atrophy.  There are hammertoes.  Reflexes: Symmetric in the upper extremities difficult to elicit at the knees due to stiffness.  Toe signs are downgoing.  Sensory: Normal light touch and pinprick in the upper extremities.  In the lower extremities there is reduction of light touch from the ankles down and pinprick was a bit patchy but reduced in the toes.  Vibration was reduced at the ankles and position sense was questionable in the great toes.  Cerebellar: Finger to nose shows tremulousness.  I believe at full rest there is no tremor.  Gait and Station: Impossible to test          Results:      Lab Results   Component Value Date    GLUCOSE 83 04/07/2019    BUN 29 (H) 04/07/2019    CREATININE 1.62 (H) 04/07/2019    EGFRIFNONA 42 (L) 04/07/2019    EGFRIFAFRI 52 (L) 06/12/2017    BCR 17.9 04/07/2019    CO2 20.3 (L) 04/07/2019    CALCIUM 9.0 04/07/2019    PROTENTOTREF 6.0 10/25/2018    ALBUMIN 3.60 04/06/2019    LABIL2 0.9 10/25/2018    AST 22 04/06/2019    ALT 25 04/06/2019       Lab Results   Component Value Date    WBC 7.77 04/07/2019    HGB 9.9 (L) 04/07/2019    HCT 31.4 (L) 04/07/2019    .3 (H) 04/07/2019     04/07/2019         .No results found for: RPR      Lab Results   Component Value Date    TSH 5.280 (H) 04/06/2019         Lab Results   Component Value Date    [id number] 335 04/07/2019         Lab Results   Component Value Date    FOLATE 18.70 04/07/2019         No results found for: HGBA1C    Head CT films reviewed showing  mild to moderate atrophy, diffuse with minor microvascular change  Bladder scan show urinary retention to about 400 cc  CT scans done 10/2018 were reviewed of cervical, thoracic and lumbar spine.    Assessment:   1.  Confusion and visual hallucinations-suspect due to toxic metabolic encephalopathy.  He may be dehydrated as a part of it which may contribute to medication side effects.  At initial evaluation I cannot entirely exclude an emerging Lewy body dementia  2.  Chronic debilitating pain in the legs some of which is related to immobilization since he was doing much better after rehab.  The patient appears to have significant stiffness in the legs however some of this probably generated by pain.  Given this he cannot be adequately evaluated for a dystonia or a structural neurologic problem by physical exam.  He had previous CT scans of the entire spine which showed various degrees of degenerative disc disease but there was no evidence of severe spinal stenosis at any level that would cause a myelopathy.  3.  Urinary retention        Plan:  1.  Continue attempts at hydration and evaluation for urinary retention.  2.  Avoid central acting drugs wherever possible.  3.  Follow-up with Dr. Yo as an outpatient.                            .

## 2019-04-08 PROBLEM — D72.829 LEUKOCYTOSIS: Status: ACTIVE | Noted: 2019-01-01

## 2019-04-08 NOTE — PROGRESS NOTES
[facility]     LOS: 1 day     Subjective:    CC:Leg Pain (C/O BILAT. LEG PAIN; PT WITH OF CHRONIC PAIN AND IS FOLLOWED BY A PAIN MD) and Altered Mental Status (PT REPORTS WAKING THIS AM CONFUSED, PT AA&O x4 NOW; PT RECENTLY MOVED IN WITH HIS SON AND SON REPORTS THAT HE CAN'T TAKE CARE OF HIM ANYMORE)    DIET:  Diet Order   Procedures   • Diet Regular     Not a reliable historian this afternoon      Objective:    Vital Signs:  Temp:  [98 °F (36.7 °C)-98.4 °F (36.9 °C)] 98 °F (36.7 °C)  Heart Rate:  [] 94  Resp:  [18-22] 18  BP: (116-139)/(65-70) 116/65    SpO2:  [91 %-96 %] 94 %  on  Flow (L/min):  [2-3.5] 3.5;   Device (Oxygen Therapy): room air  Body mass index is 38.77 kg/m².    Physical Exam   Constitutional: He appears well-developed and well-nourished. No distress.   HENT:   Head: Normocephalic and atraumatic.   Cardiovascular: Exam reveals no friction rub.   No murmur heard.  Pulmonary/Chest: Effort normal and breath sounds normal.   Abdominal: Soft. Bowel sounds are normal. He exhibits no distension. There is no tenderness.   Neurological: He is alert.   More confused today on my exam but according to the nurse patient was doing reasonably well this morning   Skin: Skin is warm and dry.       Results Review:    Glucose   Date Value Ref Range Status   04/08/2019 125 (H) 65 - 99 mg/dL Final   04/07/2019 83 65 - 99 mg/dL Final   04/06/2019 122 (H) 65 - 99 mg/dL Final     Results from last 7 days   Lab Units 04/08/19  0505   WBC 10*3/mm3 13.06*   HEMOGLOBIN g/dL 9.3*   HEMATOCRIT % 29.5*   PLATELETS 10*3/mm3 203     Results from last 7 days   Lab Units 04/08/19  0505  04/06/19  1200   SODIUM mmol/L 139   < > 140   POTASSIUM mmol/L 4.0   < > 4.0   CHLORIDE mmol/L 103   < > 100   CO2 mmol/L 18.3*   < > 22.7   BUN mg/dL 23   < > 28*   CREATININE mg/dL 1.29*   < > 1.55*   CALCIUM mg/dL 9.2   < > 9.6   BILIRUBIN mg/dL  --   --  0.3   ALK PHOS U/L  --   --  60   ALT (SGPT) U/L  --    --  25   AST (SGOT) U/L  --   --  22   GLUCOSE mg/dL 125*   < > 122*    < > = values in this interval not displayed.             Results from last 7 days   Lab Units 04/06/19 2009 04/06/19  1200   TROPONIN T ng/mL 0.016 0.015     Cultures:  Blood Culture   Date Value Ref Range Status   04/06/2019 No growth at 24 hours  Preliminary   04/06/2019 No growth at less than 24 hours  Preliminary       I have reviewed daily medications and changes in CPOE    Scheduled meds    aspirin 650 mg Oral Daily   DULoxetine 120 mg Oral Nightly   enoxaparin 1 mg/kg Subcutaneous Q24H   finasteride 5 mg Oral Daily   levothyroxine 88 mcg Oral Q AM   phenytoin 500 mg Oral Nightly   sodium chloride 3 mL Intravenous Q12H   thiamine (VITAMIN B1) IVPB 100 mg Intravenous Daily          PRN meds  •  acetaminophen  •  HYDROcodone-acetaminophen  •  OLANZapine  •  [COMPLETED] Insert peripheral IV **AND** sodium chloride  •  sodium chloride        Lumbar radiculopathy    Acquired hypothyroidism    Hallucinations    Hx pulmonary embolism    Seizure disorder (CMS/HCC)    Leukocytosis        Assessment/Plan:    Metabolic encephalopathy-inpatient neurology and recommendation is made to follow-up with Dr. Yo outpatient  -tsh 5.28, b12 335, folate 18  -hold lyrica  -Improved yesterday slightly more confused this afternoon.  The nurse states that the patient was better this morning     Chronic pain  -Oral pain medication as needed  -He has a pain pump     H/o PE- and had clot in 10/2019  -not currently on anticoagulation  -No blood clots on lower extremity Doppler  -D-dimer is highly positive  -change to low-dose anticoagulation  -Patient cannot take Eliquis or Xarelto because of Dilantin     Seizures-dilantin  -no seizures in 15 years ago  -Doubt this is seizures causing his admission  -Dilantin level is okay, no nystagmus on exam     hypothyroidism continue Synthroid     Acute renal failure-IV fluids overnight  -high PVR  -place danielle and see if the  renal function improves    DVT PPX: low dose lovenox    Maybe d/c 1-2 days    Jus Barreto MD  04/08/19  5:49 PM

## 2019-04-08 NOTE — PLAN OF CARE
Problem: Patient Care Overview  Goal: Plan of Care Review  Outcome: Ongoing (interventions implemented as appropriate)   04/08/19 2436   Coping/Psychosocial   Plan of Care Reviewed With patient   Plan of Care Review   Progress improving   OTHER   Outcome Summary pt vss, no complaits of pain throughout shift. no hallucinations, pt alert and oriented throughout shift. pt resting well. Q2 turn, danielle care. awaiting placement for longterm care.      Goal: Individualization and Mutuality  Outcome: Ongoing (interventions implemented as appropriate)    Goal: Discharge Needs Assessment  Outcome: Ongoing (interventions implemented as appropriate)      Problem: Fall Risk (Adult)  Goal: Absence of Fall  Outcome: Ongoing (interventions implemented as appropriate)      Problem: Skin Injury Risk (Adult)  Goal: Skin Health and Integrity  Outcome: Ongoing (interventions implemented as appropriate)

## 2019-04-08 NOTE — PROGRESS NOTES
Discharge Planning Assessment  [facility]     Patient Name: Chava Stone Sr.  MRN: [id number]  Today's Date: 4/8/2019    Admit Date: 4/6/2019    Discharge Needs Assessment     Row Name 04/08/19 1651       Living Environment    Lives With  child(efren), dependent    Name(s) of Who Lives With Patient  sonChava Jr    Primary Care Provided by  self    Provides Primary Care For  no one    Family Caregiver Names  son Chava Wright    Able to Return to Prior Arrangements  yes       Resource/Environmental Concerns    Resource/Environmental Concerns  environmental    Home Accessibility Concerns  not wheelchair accessible       Transition Planning    Patient/Family Anticipates Transition to  long term care facility    Patient/Family Anticipated Services at Transition  none    Transportation Anticipated  health plan transportation       Discharge Needs Assessment    Readmission Within the Last 30 Days  no previous admission in last 30 days    Equipment Currently Used at Home  wheelchair;walker, rolling    Anticipated Changes Related to Illness  none        Discharge Plan     Row Name 04/08/19 2327       Plan    Plan  skilled rehab with possible long term care    Patient/Family in Agreement with Plan  yes    Plan Comments  Facesheet verified.  IMM is documented.  Patient lives in a house with his son Chava Wright and his emergency contact is his dtr, Hanny Stone.  He uses a WC at home and has a rolling walker.  He states he would like skilled rehab and possible long term care to follow as his son is unable to care for all his needs at this point.  [facility] choice [facility].  VM referral to [facility].  Spoke with dtrHanny.  She would like a referral to several facilites to fine a possbile long term care bed.  And states she would tour and help her father with DC planning  Referrals pending...........................Muna Ferguson RN        Destination      Service Provider Request Status Selected  Services Address Phone Number Fax Number    [facility] - [facility] N[facility] [address] [facility] [facility] [address] [phone]    [facility] Pending - Request Sent N/A [address], [facility] [address] [phone]    [facility] Pending - Request Sent N/A [address], [facility] [address] [phone]    [facility] Pending - Request Sent N/A [address] [facility] [address] [phone]    [facility] Pending - [facility] Sent N/A [address] [address] [phone]      Durable Medical Equipment      No service coordination in this encounter.      Dialysis/Infusion      No service coordination in this encounter.      Home Medical Care      No service coordination in this encounter.      Therapy      No service coordination in this encounter.      Community Resources      No service coordination in this encounter.          Demographic Summary     Row Name 04/08/19 1644       General Information    Admission Type  inpatient    Arrived From  home    Required Notices Provided  Important Message from Medicare    Referral Source  admission list       Contact Information    Permission Granted to Share Info With  ;family/designee    Contact Information Comments  Dtr, Hanny Stone [phone]        Functional Status     Row Name 04/08/19 1650       Functional Status    Usual Activity Tolerance  fair    Current Activity Tolerance  fair       Functional Status, IADL    Medications  independent    Meal Preparation  independent    Housekeeping  independent    Laundry  independent    Shopping  independent       Mental Status    General Appearance WDL  WDL       Mental Status Summary    Recent Changes in Mental Status/Cognitive Functioning  no changes       Employment/    Employment Status  retired         Psychosocial    No documentation.       Abuse/Neglect    No documentation.       Legal    No documentation.       Substance Abuse    No documentation.       Patient Forms     Row Name 04/08/19 9112       Patient Forms    Provider Choice List  Delivered    Delivered to  Patient    Method of delivery  In person            Muna Ferguson RN

## 2019-04-08 NOTE — PLAN OF CARE
Problem: Patient Care Overview  Goal: Plan of Care Review   04/08/19 0302   Coping/Psychosocial   Plan of Care Reviewed With patient   Plan of Care Review   Progress no change   OTHER   Outcome Summary Pt alert and oriented at beginning of shift, but increasingly more confused during the night. Pt can be re-oriented. CT of abd. and pelvis completed. Overnight pulse study. Pt remains continues to have rapid shallow breathing, but remains 92% or greater on room air during overnight sleep study. Q2 turn and danielle care provided. Will continue to monitor

## 2019-04-08 NOTE — DISCHARGE PLACEMENT REQUEST
"Petra Stone Sr. (68 y.o. Male)     Date of Birth Social Security Number Address Home Phone MRN    1950  [id number] [place] [id number] [phone] [id number]    Christianity Marital Status          Tenriism        Admission Date Admission Type Admitting Provider Attending Provider Department, Room/Bed    4/6/19 Emergency Rashid Banks MD Edling, Stephen A, MD [address], [address]    Discharge Date Discharge Disposition Discharge Destination                       Attending Provider:  Jus Barreto MD    Allergies:  No Known Allergies    Isolation:  None   Infection:  None   Code Status:  CPR    Ht:  172.7 cm (68\")   Wt:  116 kg (255 lb)    Admission Cmt:  None   Principal Problem:  None                Active Insurance as of 4/6/2019     Primary Coverage     Payor Plan Insurance Group Employer/Plan Group    ANTHEM MEDICARE REPLACEMENT ANTHEM MEDICARE ADVANTAGE [id number]     Payor Plan Address Payor Plan Phone Number Payor Plan Fax Number Effective Dates     [id number] [phone]  1/1/2016 - None Entered    [facility] [address]       Subscriber Name Subscriber Birth Date Member ID       PETRA STONE LYDIA TA 1950 [id number]                 Emergency Contacts      (Rel.) Home Phone Work Phone Mobile Phone    Hanny Stone (Daughter) -- -- [phone]              "

## 2019-04-08 NOTE — CONSULTS
"IDENTIFYING INFORMATION: The patient is a 68-year-old white male admitted complaining of hallucinations.    CHIEF COMPLAINT: None given    INFORMANT: Patient and chart    RELIABILITY: Good    HISTORY OF PRESENT ILLNESS: The patient is a 68-year-old white male admitted after he had complained of visual hallucinations.  The patient reported that he is seeing \"a big kid and a little kid\".  He also reported that \"the ceiling fan was on the wall and the windows were on the ceiling\".  The patient denies prior auditory or visual hallucinations and denies current visual or auditory hallucinations.  He does not appear to be responding to any internal stimuli during this evaluation.  The patient has a history of chronic lower leg pain and has a history of a spinal stimulator, and his psychiatric evaluation for implantation of this device represents his only previous psychiatric contact, though he is prescribed Cymbalta.  He denies prior suicide attempts or gestures and denies current suicidal ideation.  He does report that he is feeling poorly at this time, feeling as though this may be related to his loss of appetite.  There has been some concern that the patient may have had a recurrence of C. difficile.  Patient lives with his son and daughter-in-law.  He expects to go to physical rehab following his discharge from this facility.    PAST PSYCHIATRIC HISTORY: As above    PAST MEDICAL HISTORY: Significant for chronic leg pain, urinary retention, epilepsy, deep venous thrombosis, vitamin D deficiency, peripheral neuropathy, pulmonary embolism, C. difficile    MEDICATIONS:   Current Facility-Administered Medications   Medication Dose Route Frequency Provider Last Rate Last Dose   • acetaminophen (TYLENOL) tablet 650 mg  650 mg Oral Q4H PRN Jus Barreto MD       • aspirin tablet 650 mg  650 mg Oral Daily Jus Barreto MD   650 mg at 04/08/19 0911   • DULoxetine (CYMBALTA) DR capsule 120 mg  120 mg Oral Nightly " Jus Barreto MD   120 mg at 04/07/19 2011   • enoxaparin (LOVENOX) syringe 120 mg  1 mg/kg Subcutaneous Q24H Jus Barreto MD   120 mg at 04/07/19 2300   • finasteride (PROSCAR) tablet 5 mg  5 mg Oral Daily Jus Barreto MD   5 mg at 04/08/19 0911   • HYDROcodone-acetaminophen (NORCO) 5-325 MG per tablet 1 tablet  1 tablet Oral Q4H PRN Jus Barreto MD   1 tablet at 04/08/19 0638   • levothyroxine (SYNTHROID, LEVOTHROID) tablet 88 mcg  88 mcg Oral Q AM Jus Barreto MD   88 mcg at 04/08/19 0638   • phenytoin (DILANTIN) ER capsule 500 mg  500 mg Oral Nightly Jus Barreto MD   500 mg at 04/07/19 2011   • sodium chloride 0.9 % flush 10 mL  10 mL Intravenous PRN Tiara Meraz APRN       • sodium chloride 0.9 % flush 3 mL  3 mL Intravenous Q12H Jus Barreto MD   3 mL at 04/08/19 0900   • sodium chloride 0.9 % flush 3-10 mL  3-10 mL Intravenous PRN Jus Barreto MD       • thiamine (B-1) 100 mg in sodium chloride 0.9 % 100 mL IVPB  100 mg Intravenous Daily Jus Barreto  mL/hr at 04/08/19 0911 100 mg at 04/08/19 0911         ALLERGIES: None    FAMILY HISTORY: Noncontributory    SOCIAL HISTORY: The patient lives with his son and daughter-in-law.  There is no reported use of alcohol tobacco or street drugs.    MENTAL STATUS EXAM: The patient is a well-developed well-nourished white male appearing stated age.  He is no apparent physical distress at the time of examination.  He is awake alert and oriented x4.  He is able to identify the [place] president.  His mood is calm his affect constricted.  Speech is relevant and coherent.  Testing of memory reveals recall of 1 out of 3 objects after 5 minutes.  The patient's for digit span is 7 his reverse digit span 3.  The patient denies suicidal homicidal ideation and denies current psychotic symptoms.  He does not appear to be responding to internal stimuli.  His judgment and insight exhibits some impairment in  his capacity for abstract thought appears to be somewhat impaired.    ASSETS/LIABILITIES: To be assessed    DIAGNOSTIC IMPRESSION: Toxic metabolic encephalopathy, multiple medical problems described previously.    PLAN: The patient's somewhat inconsistent performance on mental status examination would tend to point more towards a delirium as the cause of his current mental status changes.  He is not presently reporting any visual or auditory hallucinations and does not appear to be responding to internal stimuli.  I will leave an order for as needed Zyprexa to be given should the patient experience any psychotic symptoms or agitation related thereto.    Thank You very much for the opportunity to see this patient.

## 2019-04-08 NOTE — PROGRESS NOTES
Discharge Planning Assessment  [facility]     Patient Name: Chava Stone Sr.  MRN: [id number]  Today's Date: 4/8/2019    Admit Date: 4/6/2019    Discharge Needs Assessment     Row Name 04/08/19 1651       Living Environment    Lives With  child(efren), dependent    Name(s) of Who Lives With Patient  sonChava Jr    Primary Care Provided by  self    Provides Primary Care For  no one    Family Caregiver Names  son Chava Wright    Able to Return to Prior Arrangements  yes       Resource/Environmental Concerns    Resource/Environmental Concerns  environmental    Home Accessibility Concerns  not wheelchair accessible       Transition Planning    Patient/Family Anticipates Transition to  long term care facility    Patient/Family Anticipated Services at Transition  none    Transportation Anticipated  health plan transportation       Discharge Needs Assessment    Readmission Within the Last 30 Days  no previous admission in last 30 days    Equipment Currently Used at Home  wheelchair;walker, rolling    Anticipated Changes Related to Illness  none        Discharge Plan     Row Name 04/08/19 4859       Plan    Plan  skilled rehab with possible long term care    Patient/Family in Agreement with Plan  yes    Plan Comments  Facesheet verified.  IMM is documented.  Patient lives in a house with his son Chava Wright and his emergency contact is his dtr, Hanny Stone.  He uses a WC at home and has a rolling walker.  He states he would like skilled rehab and possible long term care to follow as his son is unable to care for all his needs at this point.  [facility] choice [facility].  VM referral to [facility].  Spoke with dtrHanny.  She would like a referral to several facilites to fine a possbile long term care bed.  And states she would tour and help her father with DC planning  Referrals pending...........................Muna Ferguson RN        Destination      No service coordination in this  encounter.      Durable Medical Equipment      No service coordination in this encounter.      Dialysis/Infusion      No service coordination in this encounter.      Home Medical Care      No service coordination in this encounter.      Therapy      No service coordination in this encounter.      Community Resources      No service coordination in this encounter.          Demographic Summary     Row Name 04/08/19 1644       General Information    Admission Type  inpatient    Arrived From  home    Required Notices Provided  Important Message from Medicare    Referral Source  admission list       Contact Information    Permission Granted to Share Info With  ;family/designee    Contact Information Comments  Dtr, Hanny Stone [phone]        Functional Status     Row Name 04/08/19 1650       Functional Status    Usual Activity Tolerance  fair    Current Activity Tolerance  fair       Functional Status, IADL    Medications  independent    Meal Preparation  independent    Housekeeping  independent    Laundry  independent    Shopping  independent       Mental Status    General Appearance WDL  WDL       Mental Status Summary    Recent Changes in Mental Status/Cognitive Functioning  no changes       Employment/    Employment Status  retired        Psychosocial    No documentation.       Abuse/Neglect    No documentation.       Legal    No documentation.       Substance Abuse    No documentation.       Patient Forms    No documentation.           Muna Ferguson RN

## 2019-04-08 NOTE — PROGRESS NOTES
Neurology follow-up note    CC: Altered mental status and visual hallucinations    S: States he feels somewhat better.  Less pain in legs but worse when moving them.  He denies further visual hallucinations since I saw him yesterday.    O: Vital signs: 139/-18; 98.2  Exam: Upon entering the room around 12:30 PM he was asleep.  He was wiggling his feet some but generally not his legs.  There was no tremor.  I awoke him and no tremor developed initially.  With moving the arm some tremor/tremulousness developed but less than yesterday.  Mental status: He was alert oriented to person place and time.  He did not remember my name.  Visual fields were full and face was symmetric.  There was no drift of outstretched arms but mild tremor/tremulousness.  Examination of the legs I am able to show that he has normal tone when his arms are at complete rest.  The legs are difficult to study because of pain.  I can move the right leg saw him at the knee without too much stiffening up but the left one with associated severe pain he states he basically locks his knee.    Reviewed note by Dr. Dudley of psychiatry who is inclined to believe this is a delirium.    A/P:  1.  Confusion/hallucinations-suspect toxic metabolic encephalopathy to be the origin  2.  Bilateral leg pain and weakness-difficult to examine.  Previous evaluation of total spine was negative for severe stenosis

## 2019-04-08 NOTE — CONSULTS
Subjective     REASON FOR CONSULTATION: Opinion regarding anticoagulation  Provide an opinion on any further workup or treatment                             REQUESTING PHYSICIAN:  [facility]    RECORDS OBTAINED:  Records of the patients history including those obtained from the referring provider were reviewed and summarized in detail.    HISTORY OF PRESENT ILLNESS:  The patient is a 68 y.o. year old male who is here for an opinion about the above issue.    History of Present Illness patient is a 68-year-old male with chronic pain and peripheral neuropathy with a spinal stimulator history of seizure disorder and DVT PE during his last admission that we saw during his last admission to the hospital with some abnormal bone imaging of the spine with possible lytic lesions versus hemangioma and also DVT on 10/26/2018 with probable pulmonary embolism on a non-CT angiogram chest CT.  Patient was discharged home with Lovenox to the rehab with plans to follow-up in our office in a month which he never did-he was scheduled to have follow-up bone scans and CAT scans.  We did receive a phone call from the nursing home on 11/26/2018 asking whether he could stop his Lovenox and at that time we recommended continuing this    He is admitted at this time to the hospital with hallucinations and is no longer on Lovenox . there is a note from Dr. Catracho Yo on 12/4/2018 recommended switching to Coumadin but the patient tells me he did not want to deal with this and continue Lovenox until about February when the prescription ran out and he did not refill it because he did not realize that he had to     we are asked to consult on what to do about his anticoagulation.  His repeat Doppler shows chronic right lower extremity DVT in the gastrocnemius everything else was benign and his upper extremities are negative    CT abdomen shows extensive fecal burden in the colon with a transition point at the splenic flexure and colonoscopy is  recommended noted as are commented on  Because of his hallucinations    Past Medical History:   Diagnosis Date   • Anemia    • Arthritis    • At risk for sleep apnea     STOP BANG 5   • Chronic pain    • Diverticulosis of sigmoid colon 2011   • DVT (deep venous thrombosis) (CMS/HCC)    • Epilepsy (CMS/HCC)     Since childhood   • History of solitary pulmonary nodule    • History of vitamin D deficiency    • Hyperprolactinemia (CMS/HCC)    • Hypertension    • Hypogonadism in male    • Hypothyroidism    • Injury of back    • Leg pain    • Low back pain     DDD   • Lumbar canal stenosis    • Lumbar radiculopathy    • Lytic bone lesions on xray    • Peripheral neuropathy    • Pulmonary embolism (CMS/HCC)         Past Surgical History:   Procedure Laterality Date   • COLONOSCOPY      1 polyp a 18 cm proximal to anus; sigmoid diverticulosis   • EPIDURAL BLOCK     • HAND SURGERY Right    • PROSTATE SURGERY      Biopsy, benign   • SPINAL CORD STIMULATOR IMPLANT     • SPINAL CORD STIMULATOR IMPLANT N/A 2018    Procedure: [place] [facility]- Spinal Cord Stimulator revision--battery ;  Surgeon: Ced Castro MD;  Location: [facility];  Service: Pain Management   • THYROID BIOPSY      using Intraspinal Neurostimulator        No current facility-administered medications on file prior to encounter.      Current Outpatient Medications on File Prior to Encounter   Medication Sig Dispense Refill   • acetaminophen (TYLENOL) 325 MG tablet Take 2 tablets by mouth Every 6 (Six) Hours As Needed for Mild Pain .     • aspirin 325 MG tablet Take 650 mg by mouth Daily.     • DULoxetine (CYMBALTA) 60 MG capsule TAKE TWO CAPSULES BY MOUTH ONCE NIGHTLY 60 capsule 2   • levothyroxine (SYNTHROID, LEVOTHROID) 88 MCG tablet TAKE ONE TABLET BY MOUTH DAILY 90 tablet 0   • lisinopril-hydrochlorothiazide (PRINZIDE,ZESTORETIC) 20-12.5 MG per tablet Take 1 tablet by mouth Daily. 30 tablet 0   • pregabalin (LYRICA) 150 MG  capsule Take 2 capsules by mouth 2 (Two) Times a Day. 30 capsule 0   • Cholecalciferol (VITAMIN D) 2000 UNITS capsule Take 1 capsule by mouth Daily. HOLD PRIOR TO SURG     • DILANTIN 100 MG ER capsule Take 5 capsules by mouth Every Night. 150 capsule 0   • enoxaparin (LOVENOX) 120 MG/0.8ML solution syringe Inject 0.67 mL under the skin into the appropriate area as directed Every 12 (Twelve) Hours. 12.6 mL    • finasteride (PROSCAR) 5 MG tablet Take 5 mg by mouth Daily.          ALLERGIES:  No Known Allergies     Social History     Socioeconomic History   • Marital status:      Spouse name: Not on file   • Number of children: Not on file   • Years of education: 3 years College   • Highest education level: Not on file   Occupational History   • Occupation: Manager     Employer: [employer]   Tobacco Use   • Smoking status: Never Smoker   • Smokeless tobacco: Never Used   Substance and Sexual Activity   • Alcohol use: Yes     Comment: Social Drinker   • Drug use: No        Family History   Problem Relation Age of Onset   • Heart disease Mother    • Depression Father    • Heart disease Father    • Lung cancer Brother    • Malig Hyperthermia Neg Hx         Review of Systems   Constitutional: Positive for activity change, appetite change and fatigue.   Respiratory: Negative for cough and shortness of breath.    Gastrointestinal: Positive for abdominal distention.   Genitourinary: Positive for difficulty urinating.   Musculoskeletal: Positive for gait problem (Cannot walk too-weak uses a wheelchair).        Objective     Vitals:    04/07/19 2300 04/07/19 2323 04/08/19 0046 04/08/19 0741   BP:    139/70   BP Location:    Right arm   Patient Position:    Lying   Pulse: 97 98  102   Resp: 22   18   Temp: 98.4 °F (36.9 °C)   98.2 °F (36.8 °C)   TempSrc: Oral   Oral   SpO2: 91% 95% 96% 95%   Weight:       Height:         No flowsheet data found.    Physical Exam    GENERAL:  Well-developed, well-nourished in  no acute distress.  Tremulous  SKIN:  Warm, dry without rashes, purpura or petechiae.  EYES:  Pupils equal, round and reactive to light.  EOMs intact.  Conjunctivae normal.  EARS:  Hearing intact.  NOSE:  Septum midline.  No excoriations or nasal discharge.  MOUTH:  Tongue is well-papillated; no stomatitis or ulcers.  Lips normal.  THROAT:  Oropharynx without lesions or exudates.  NECK:  Supple with good range of motion; no thyromegaly or masses, no JVD.  LYMPHATICS:  No cervical, supraclavicular, axillary or inguinal adenopathy.  CHEST:  Lungs clear to auscultation. Good airflow.  CARDIAC:  Regular rate and rhythm without murmurs, rubs or gallops. Normal S1,S2.  ABDOMEN:  Soft, nontender with no hepatosplenomegaly or masses.  Ramey catheter in place  EXTREMITIES:  No clubbing, cyanosis or edema.  NEUROLOGICAL:  Cranial Nerves II-XII grossly intact.  No focal neurological deficits.  PSYCHIATRIC:  Normal affect and mood.        RECENT LABS:  Hematology WBC   Date Value Ref Range Status   04/08/2019 13.06 (H) 3.40 - 10.80 10*3/mm3 Final     RBC   Date Value Ref Range Status   04/08/2019 2.97 (L) 4.14 - 5.80 10*6/mm3 Final     Hemoglobin   Date Value Ref Range Status   04/08/2019 9.3 (L) 13.0 - 17.7 g/dL Final     Hematocrit   Date Value Ref Range Status   04/08/2019 29.5 (L) 37.5 - 51.0 % Final     Platelets   Date Value Ref Range Status   04/08/2019 203 140 - 450 10*3/mm3 Final      B12 folate normal-SPEP [id number] normal-PSA normal    Assessment/Plan       1. Nonspecific bone lesions on spine CT:   · Unable to obtain MRI due to spinal cord stimulator  · 10/24/18 CT cervical spine with multiple small lytic lesions (3-5 millimeters), CT thoracic spine with similar lesions, largest T2 6 millimeters, none evident on CT lumbar spine.  Lesions indeterminate in nature, benign versus malignant.  · Skeletal survey 10/25/18 with no visible lytic or blastic lesions  · CT chest abdomen pelvis 10/25/18 with suspicion of  small pulmonary emboli, possible right femoral vein DVT, distention of proximal and mid:, Lobulated lesion inferior margin bladder, possibly related to prostate, redemonstration of lucent foci in thoracic spine, indeterminate.    · Labs 10/25/18 with PSA 1.05, negative serum protein electrophoresis and immunoelectrophoresis with normal quantitative immunoglobulins, normal free light chain ratio.  · Elevated chromogranin a 10/25/18 of unclear significance (value of 8 just above normal range).  24 hour urine 5-HIAA pending  · Bone lesions of unclear significance at this time, anticipate interval follow-up on CT at 2-3 months.  Will require future cystoscopy and colonoscopy for evaluation of potential primary malignancy.  Colonoscopy anticipated in approximately 4 weeks by GI once patient has completed treatment for C. difficile colitis and recovered.  2. Anemia:   · B12, folic acid levels are normal.  Iron studies consistent with chronic disease.      · Stool positive for occult blood, no clinical evidence of GI blood loss however.  · Hemoglobin today improved at 11.4-SPEP negative  ·   3. Left lower lobe pulmonary embolism, right lower extremity femoral/calf DVT 10/25/18:   · Non-angiogram CT chest 10/25/18 with filling defect left lower lobe pulmonary artery and suspicion for femoral DVT on the right.    · Lower extremity Doppler ultrasound 10/26/18 did confirm extensive deep venous thrombosis in the right leg from the femoral vein down into the calf.   · Lower extremity Doppler with notation of right mid calf mass 2 cm of unclear significance or etiology.  Consider repeat Doppler in future.  · Patient still not very mobile spine rehab after he was hospitalized and immobilized with C. difficile colitis,   · Continuing back on Lovenox but patient discontinued 2 months ago when he ran out of medications and repeat Doppler shows no acute [id number] pending but he is at high risk for DVT again because of his  immobility.  Coumadin is a safest oral drug for him but there is no low-dose option therefore I would recommend low-dose Lovenox prophylaxis until he is mobile again  5. Discussion earlier today with Dr. Salgado regarding potential drug interactions with Dilantin in regards to oral anticoagulants.   6. Renal insufficiency:  · Patient with fluctuating creatinine ranging from 0.9 up to 1.8 in the past few months  · Today, creatinine improved at 1.29    7  .Seizure disorder  · On long-term treatment with Dilantin, follows with Dr. Yo as outpatient  · Does not want to change medication  ·   8. Peripheral neuropathy, chronic low back pain, spinal cord stimulator revision 8/27/18    Plan    1.  Repeat CT of the thoracic spine to follow-up on bone lesions when his creatinine is better (MRI cannot be done because of the stimulator)  2.  Check d-dimer  3.  Patient has had almost 5 months of treatment for his DVT and PE at this point I think it would be reasonable to put him on prophylactic dose of 40 mg Lovenox daily to avoid interactions with his Dilantin and the other oral medications    We will follow briefly

## 2019-04-08 NOTE — THERAPY EVALUATION
Acute Care - Physical Therapy Initial Evaluation  [facility]     Patient Name: Chava Stone Sr.  : 1950  MRN: [id number]  Today's Date: 2019   Onset of Illness/Injury or Date of Surgery: 19  Date of Referral to PT: 19  Referring Physician: Mary Lou      Admit Date: 2019    Visit Dx:     ICD-10-CM ICD-9-CM   1. Hallucinations R44.3 780.1   2. General weakness R53.1 780.79   3. Leg pain, bilateral M79.604 729.5    M79.605    4. Impaired mobility Z74.09 799.89     Patient Active Problem List   Diagnosis   • Spinal stenosis of lumbar region   • Lumbar radiculopathy   • Spinal cord stimulator status   • Pain in both lower extremities   • Chronic pain   • Postlaminectomy syndrome of lumbar region   • Benign essential hypertension   • Colon polyp   • Diverticulosis of large intestine   • Elevated prostate specific antigen (PSA)   • Psychomotor epilepsy (CMS/HCC)   • Hyperprolactinemia (CMS/HCC)   • Hypogonadism in male   • Acquired hypothyroidism   • Malignant neoplasm of prostate (CMS/HCC)   • Solitary pulmonary nodule   • Vitamin D deficiency   • Battery end of life of spinal cord stimulator   • Weakness   • Peripheral neuropathy   • Diarrhea   • C. difficile colitis   • Pulmonary embolus (CMS/HCC)   • Acute deep vein thrombosis (DVT) of distal end of right lower extremity (CMS/HCC)   • Hallucinations   • Hx pulmonary embolism   • Seizure disorder (CMS/HCC)   • Leukocytosis     Past Medical History:   Diagnosis Date   • Anemia    • Arthritis    • At risk for sleep apnea     STOP BANG 5   • Chronic pain    • Diverticulosis of sigmoid colon 2011   • DVT (deep venous thrombosis) (CMS/HCC)    • Epilepsy (CMS/HCC)     Since childhood   • History of solitary pulmonary nodule    • History of vitamin D deficiency    • Hyperprolactinemia (CMS/HCC)    • Hypertension    • Hypogonadism in male    • Hypothyroidism    • Injury of back    • Leg pain    • Low back pain     DDD   • Lumbar canal  stenosis    • Lumbar radiculopathy    • Lytic bone lesions on xray 2018   • Peripheral neuropathy    • Pulmonary embolism (CMS/HCC)      Past Surgical History:   Procedure Laterality Date   • COLONOSCOPY      1 polyp a 18 cm proximal to anus; sigmoid diverticulosis   • EPIDURAL BLOCK     • HAND SURGERY Right    • PROSTATE SURGERY      Biopsy, benign   • SPINAL CORD STIMULATOR IMPLANT     • SPINAL CORD STIMULATOR IMPLANT N/A 2018    Procedure: [place] [facility]- Spinal Cord Stimulator revision--battery ;  Surgeon: Ced Castro MD;  Location: [facility] OR;  Service: Pain Management   • THYROID BIOPSY      using Intraspinal Neurostimulator        PT ASSESSMENT (last 12 hours)      Physical Therapy Evaluation     Row Name 19 1401          PT Evaluation Time/Intention    Subjective Information  no complaints  -     Document Type  evaluation  -     Mode of Treatment  individual therapy;physical therapy  -     Patient Effort  fair  -     Symptoms Noted During/After Treatment  none  -     Row Name 19 1401          General Information    Patient Profile Reviewed?  yes  -     Onset of Illness/Injury or Date of Surgery  19  -     Referring Physician  Edling  -     Patient/Family Observations  pt supine in bed no acute distress  -     Prior Level of Function  dependent: son assists pt OOB to WC, BLE weak  -     Equipment Currently Used at Home  wheelchair;lift device  -     Pertinent History of Current Functional Problem  hallucinations  -     Existing Precautions/Restrictions  fall  -     Risks Reviewed  patient:  -     Benefits Reviewed  patient:  -     Barriers to Rehab  medically complex;previous functional deficit  -     Row Name 19 1401          Cognitive Assessment/Intervention- PT/OT    Orientation Status (Cognition)  oriented to;person;situation  -     Follows Commands (Cognition)  follows one step commands;over 90% accuracy;verbal cues/prompting  required;physical/tactile prompts required  -     Safety Deficit (Cognitive)  moderate deficit;insight into deficits/self awareness  -     Row Name 04/08/19 1401          Bed Mobility Assessment/Treatment    Comment (Bed Mobility)  NT, performed bed therex  -[facility] Name 04/08/19 1401          General ROM    GENERAL ROM COMMENTS  significantly impaired BLEs- ankles functional however significantly impaired hip and knees- appears very rigid difficult to attempt PROM  -[facility] Name 04/08/19 1401          MMT (Manual Muscle Testing)    General MMT Comments  fair ankle movement, rigid hip and knees  -[facility] Name 04/08/19 1401          Motor Assessment/Intervention    Additional Documentation  Therapeutic Exercise (Group);Therapeutic Exercise Interventions (Group)  -[facility] Name 04/08/19 1401          Therapeutic Exercise    Therapeutic Exercise  supine, lower extremities  -     Additional Documentation  Therapeutic Exercise (Row)  -[facility] Name 04/08/19 1401          Lower Extremity Supine Therapeutic Exercise    Performed, Supine Lower Extremity (Therapeutic Exercise)  quadriceps sets;ankle pumps BLEs rigid-unable to perform heel slides or hip abd  -     Exercise Type, Supine Lower Extremity (Therapeutic Exercise)  AAROM (active assistive range of motion)  -     Sets/Reps Detail, Supine Lower Extremity (Therapeutic Exercise)  1/5  -     Row Name 04/08/19 1401          Plan of Care Review    Plan of Care Reviewed With  patient  -[facility] Name 04/08/19 1401          Physical Therapy Clinical Impression    Date of Referral to PT  04/08/19  -     Criteria for Skilled Interventions Met (PT Clinical Impression)  yes  -     Pathology/Pathophysiology Noted (Describe Specifically for Each System)  musculoskeletal;neuromuscular  -     Impairments Found (describe specific impairments)  gait, locomotion, and balance;ROM;joint integrity and mobility  -     Functional Limitations in Following  Categories (Describe Specific Limitations)  self-care;home management  -     Row Name 04/08/19 1401          Physical Therapy Goals    Bed Mobility Goal Selection (PT)  bed mobility, PT goal 1  -     Transfer Goal Selection (PT)  transfer, PT goal 1  -     Row Name 04/08/19 1401          Bed Mobility Goal 1 (PT)    Activity/Assistive Device (Bed Mobility Goal 1, PT)  bed mobility activities, all  -     [place] Level/Cues Needed (Bed Mobility Goal 1, PT)  moderate assist (50-74% patient effort);2 person assist  -     Time Frame (Bed Mobility Goal 1, PT)  long term goal (LTG);1 week  -     Row Name 04/08/19 1401          Transfer Goal 1 (PT)    Activity/Assistive Device (Transfer Goal 1, PT)  sit-to-stand/stand-to-sit;walker, rolling  -     [place] Level/Cues Needed (Transfer Goal 1, PT)  moderate assist (50-74% patient effort);2 person assist  -     Time Frame (Transfer Goal 1, PT)  long term goal (LTG);1 week  -     Row Name 04/08/19 1401          Positioning and Restraints    Pre-Treatment Position  in bed  -     Post Treatment Position  bed  -     In Bed  supine;notified nsg;call light within reach;encouraged to call for assist;exit alarm on  [facility]       User Key  (r) = Recorded By, (t) = Taken By, (c) = Cosigned By    Initials Name Provider Type     Anastasia Fatima, PT Physical Therapist        Physical Therapy Education     Title: PT OT SLP Therapies (In Progress)     Topic: Physical Therapy (In Progress)     Point: Mobility training (In Progress)     Learning Progress Summary           Patient Acceptance, E, NR by  at 4/8/2019  2:09 PM    Nonacceptance, E, NR by  at 4/6/2019  6:44 PM                   Point: Home exercise program (In Progress)     Learning Progress Summary           Patient Acceptance, E, NR by  at 4/8/2019  2:09 PM                   Point: Body mechanics (In Progress)     Learning Progress Summary           Patient Acceptance, E, NR by  at 4/8/2019  2:09  PM                   Point: Precautions (In Progress)     Learning Progress Summary           Patient Acceptance, E, NR by  at 4/8/2019  2:09 PM                               User Key     Initials Effective Dates Name Provider Type Discipline     04/03/18 -  Anastasia Fatima, PT Physical Therapist PT    SB 06/09/17 -  Savanna Talbert, RN Registered Nurse Nurse              PT Recommendation and Plan  Anticipated Discharge Disposition (PT): extended care facility  Planned Therapy Interventions (PT Eval): balance training, bed mobility training, gait training, home exercise program, ROM (range of motion), stair training, strengthening, stretching, transfer training  Therapy Frequency (PT Clinical Impression): 3 times/wk  Outcome Summary/Treatment Plan (PT)  Anticipated Discharge Disposition (PT): extended care facility  Plan of Care Reviewed With: patient  Outcome Summary: pt presents w gross weakness, impaired BLE movement (appea very rigid in bed, difficult to even assess PROM hips and knees). pt baseline WC lift, lives w son. pt has had PT in past, see previous PT notes. plans on SNU at DC. pt may benefit from skilled PT acutely to address functional deficits and assist w DC planning.   Outcome Measures     Row Name 04/08/19 1400             How much help from another person do you currently need...    Turning from your back to your side while in flat bed without using bedrails?  2  -LH      Moving from lying on back to sitting on the side of a flat bed without bedrails?  1  -LH      Moving to and from a bed to a chair (including a wheelchair)?  1  -LH      Standing up from a chair using your arms (e.g., wheelchair, bedside chair)?  1  -LH      Climbing 3-5 steps with a railing?  1  -LH      To walk in hospital room?  1  -      AM-PAC 6 Clicks Score  7  -         Functional Assessment    Outcome Measure Options  AM-PAC 6 Clicks Basic Mobility (PT)  -        User Key  (r) = Recorded By, (t) = Taken By, (c)  = Cosigned By    Initials Name Provider Type     Anastasia Fatima, PT Physical Therapist         Time Calculation:   PT Charges     Row Name 04/08/19 1412             Time Calculation    Start Time  1338  -      Stop Time  1348  -      Time Calculation (min)  10 min  -      PT Received On  04/08/19  -      PT - Next Appointment  04/10/19  -      PT Goal Re-Cert Due Date  04/15/19  -        User Key  (r) = Recorded By, (t) = Taken By, (c) = Cosigned By    Initials Name Provider Type     Anastasia Fatima, PT Physical Therapist        Therapy Charges for Today     Code Description Service Date Service Provider Modifiers Qty    96161267179 HC PT EVAL MOD COMPLEXITY 2 4/8/2019 Anastasia Fatima, PT GP 1          PT G-Codes  Outcome Measure Options: AM-PAC 6 Clicks Basic Mobility (PT)  AM-PAC 6 Clicks Score: 7      Anastasia Fatima, PT  4/8/2019

## 2019-04-08 NOTE — CONSULTS
Urology Consult  Patient Identification:  Name: Chava Stone Sr.  Age: 68 y.o.  Sex: male  :  1950  MRN: [id number]                       Chief Complaint:  Urinary retention    History of Present Illness: Pt weel known to me w previously diagnosed G^ low risk prostate cancer, has been on WW, missed his last few fus with me. Pt has known bph, on finasteride now. Pt adm to hospital with metabolic encephalopathy, chronic pain, and was found to have high pvr. Fopley placed, urine now clear. Cr down from 1.6 to 1.2 today      Problem List:  Active Hospital Problems    Diagnosis POA   • Hallucinations [R44.3] Yes   • Hx pulmonary embolism [Z86.711] Unknown   • Seizure disorder (CMS/HCC) [G40.909] Unknown   • Acquired hypothyroidism [E03.9] Yes     Annotation: 2014 - 2011--initial diagnosis hypothyroidism.    2011--ultrasound of the thyroid reveals thyroid gland enlargement.  There is a heterogeneous appearance of the thyroid gland.  There is an ill-defined 1.6 cm nodule in the left mid pole which exhibits circumferential flow without internal mammary rosacea.  Tissue sampling could be considered versus follow up with thyroid ultrasound.    2011--fine-needle aspiration of left thyroid lobe mass was negative.  Final histology shows a follicular cells with a [place] scale rating of 2 caring a 1-4% risk of malignancy.  Lobectomy not recommended.  Recommended follow up ultrasounds.    2012--ultrasound thyroid reveals a solid nodule in the left mid thyroid lobe that is unchanged in size or appearance since 2011.    2012--ultrasound of the thyroid reveals a solid appearing nodule within the left mid-to-lower thyroid pole is unchanged when compared to the study of 2011.  No new thyroid nodules are seen.  Borderline to mild thyroid enlargement, similar to previous examination.    07/10/2012--patient was evaluated by ENT in follow up.  Noted no change in the left  thyroid nodule.  Recommend annual ultrasound follow up.;      • Lumbar radiculopathy [M54.16] Yes     Past Medical History:  Past Medical History:   Diagnosis Date   • Anemia    • Arthritis    • At risk for sleep apnea     STOP BANG 5   • Chronic pain    • Diverticulosis of sigmoid colon 2011   • DVT (deep venous thrombosis) (CMS/HCC)    • Epilepsy (CMS/HCC)     Since childhood   • History of solitary pulmonary nodule    • History of vitamin D deficiency    • Hyperprolactinemia (CMS/HCC)    • Hypertension    • Hypogonadism in male    • Hypothyroidism    • Injury of back    • Leg pain    • Low back pain     DDD   • Lumbar canal stenosis    • Lumbar radiculopathy    • Lytic bone lesions on xray    • Peripheral neuropathy    • Pulmonary embolism (CMS/HCC)      Past Surgical History:  Past Surgical History:   Procedure Laterality Date   • COLONOSCOPY      1 polyp a 18 cm proximal to anus; sigmoid diverticulosis   • EPIDURAL BLOCK     • HAND SURGERY Right    • PROSTATE SURGERY      Biopsy, benign   • SPINAL CORD STIMULATOR IMPLANT     • SPINAL CORD STIMULATOR IMPLANT N/A 2018    Procedure: [place] [facility]- Spinal Cord Stimulator revision--battery ;  Surgeon: Ced Castro MD;  Location: [facility];  Service: Pain Management   • THYROID BIOPSY      using Intraspinal Neurostimulator      Home Meds:  Medications Prior to Admission   Medication Sig Dispense Refill Last Dose   • acetaminophen (TYLENOL) 325 MG tablet Take 2 tablets by mouth Every 6 (Six) Hours As Needed for Mild Pain .      • aspirin 325 MG tablet Take 650 mg by mouth Daily.      • DULoxetine (CYMBALTA) 60 MG capsule TAKE TWO CAPSULES BY MOUTH ONCE NIGHTLY 60 capsule 2 2019 at 0800   • levothyroxine (SYNTHROID, LEVOTHROID) 88 MCG tablet TAKE ONE TABLET BY MOUTH DAILY 90 tablet 0 2019 at 0800   • lisinopril-hydrochlorothiazide (PRINZIDE,ZESTORETIC) 20-12.5 MG per tablet Take 1 tablet by mouth Daily. 30 tablet 0 2019 at  0800   • pregabalin (LYRICA) 150 MG capsule Take 2 capsules by mouth 2 (Two) Times a Day. 30 capsule 0 Patient Taking Differently at Unknown time   • Cholecalciferol (VITAMIN D) 2000 UNITS capsule Take 1 capsule by mouth Daily. HOLD PRIOR TO SURG   Past Week at Unknown time   • DILANTIN 100 MG ER capsule Take 5 capsules by mouth Every Night. 150 capsule 0 4/5/2019 at 2100   • enoxaparin (LOVENOX) 120 MG/0.8ML solution syringe Inject 0.67 mL under the skin into the appropriate area as directed Every 12 (Twelve) Hours. 12.6 mL     • finasteride (PROSCAR) 5 MG tablet Take 5 mg by mouth Daily.   Past Week at Unknown time     Current Meds:   Current Facility-Administered Medications   Medication Dose Route Frequency Provider Last Rate Last Dose   • acetaminophen (TYLENOL) tablet 650 mg  650 mg Oral Q4H PRN Jus Barreto MD       • aspirin tablet 650 mg  650 mg Oral Daily Jus Barreto MD   650 mg at 04/07/19 0937   • DULoxetine (CYMBALTA) DR capsule 120 mg  120 mg Oral Nightly Jus Barreto MD   120 mg at 04/07/19 2011   • enoxaparin (LOVENOX) syringe 120 mg  1 mg/kg Subcutaneous Q24H Jus Barreto MD   120 mg at 04/07/19 2300   • finasteride (PROSCAR) tablet 5 mg  5 mg Oral Daily Jus Barreto MD   5 mg at 04/07/19 0937   • HYDROcodone-acetaminophen (NORCO) 5-325 MG per tablet 1 tablet  1 tablet Oral Q4H PRN Jus Barreto MD   1 tablet at 04/08/19 0638   • levothyroxine (SYNTHROID, LEVOTHROID) tablet 88 mcg  88 mcg Oral Q AM Jus Barreto MD   88 mcg at 04/08/19 0638   • phenytoin (DILANTIN) ER capsule 500 mg  500 mg Oral Nightly Jus Barreto MD   500 mg at 04/07/19 2011   • sodium chloride 0.9 % flush 10 mL  10 mL Intravenous PRN Tiara Meraz APRN       • sodium chloride 0.9 % flush 3 mL  3 mL Intravenous Q12H Jus Barreto MD   3 mL at 04/07/19 2012   • sodium chloride 0.9 % flush 3-10 mL  3-10 mL Intravenous PRN Jus Barreto MD       • thiamine (B-1) 100  "mg in sodium chloride 0.9 % 100 mL IVPB  100 mg Intravenous Daily Jus Barreto  mL/hr at 19 0936 100 mg at 19 0936     Allergies:  No Known Allergies  Immunizations:  Immunization History   Administered Date(s) Administered   • Flu Vaccine Quad PF >18YRS 2016   • Pneumococcal Conjugate 13-Valent (PCV13) 2015   • Pneumococcal Polysaccharide (PPSV23) 10/24/2018   • Pneumococcal, Unspecified 2015   • Tdap 2017     Social History:   Social History     Tobacco Use   • Smoking status: Never Smoker   • Smokeless tobacco: Never Used   Substance Use Topics   • Alcohol use: Yes     Comment: Social Drinker      Family History:  Family History   Problem Relation Age of Onset   • Heart disease Mother    • Depression Father    • Heart disease Father    • Lung cancer Brother    • Malig Hyperthermia Neg Hx         Review of Systems  negative 12 point system review except:as above    Objective:  tMax 24 hrs: Temp (24hrs), Av.3 °F (36.8 °C), Min:98.1 °F (36.7 °C), Max:98.4 °F (36.9 °C)    Vitals Ranges:   Temp:  [98.1 °F (36.7 °C)-98.4 °F (36.9 °C)] 98.2 °F (36.8 °C)  Heart Rate:  [] 102  Resp:  [18-22] 18  BP: (117-139)/(65-70) 139/70  Intake and Output Last 3 Shifts:   I/O last 3 completed shifts:  In: 1970 [P.O.:820; I.V.:950; IV Piggyback:200]  Out: 2100 [Urine:2100]    Exam:  /70 (BP Location: Right arm, Patient Position: Lying)   Pulse 102   Temp 98.2 °F (36.8 °C) (Oral)   Resp 18   Ht 172.7 cm (68\")   Wt 116 kg (255 lb)   SpO2 95%   BMI 38.77 kg/m²      GENERAL:    Alert, cooperative, no distress, appears stated age   Head:    Normocephalic, without obvious abnormality, atraumatic   Ears:    Normal external inspection, Nl. hearing   Throat:   Lips, mucosa, and tongue normal   Back:     No CVA tenderness   Lungs:     Respirations unlabored;Normal palpation   CV;   Regular rate and rhythm, No edema   Abdomen:     Soft, non-tender,  no masses   :   danielle " draining clear yellow   Musculoskeletal:   Weak lower extrem   Neurologic/Psych:   Orientation Normal; Mood/Affect Normal. A little shakey this am       Data Review:   Admission on 04/06/2019   No results displayed because visit has over 200 results.          No results found.      Assessment:   1. Urinary retention, likely multfactorial, ME, BPH, mentasl status better.2  2. History of lwo grade PC, will need OP fu. Would not check psa after placement of danielle      Plan:   Keep danielle several days then voiding trial.Call if problems with that.  Add flomax today. Pt to plan OP fu to check psa and monitor his PC.       Torrey Santana MD  4/8/2019

## 2019-04-08 NOTE — PLAN OF CARE
Problem: Patient Care Overview  Goal: Plan of Care Review   04/08/19 0575   Coping/Psychosocial   Plan of Care Reviewed With patient   OTHER   Outcome Summary pt presents w gross weakness, impaired BLE movement (appears very rigid in bed, difficult to even assess PROM hips and knees). pt baseline WC w lift, lives w son. Hx of SC stimulator, pt has had PT in past, see previous PT notes. plans on SNU at DC. pt may benefit from skilled PT acutely to address functional deficits and assist w DC planning.

## 2019-04-09 NOTE — PLAN OF CARE
Problem: Patient Care Overview  Goal: Plan of Care Review  Outcome: Ongoing (interventions implemented as appropriate)   04/09/19 0025 04/09/19 0407   Coping/Psychosocial   Plan of Care Reviewed With patient --    Plan of Care Review   Progress --  no change   OTHER   Outcome Summary --  VSS, at beginning of shift patient required prn dose of IM Zyprexa as he was agitated and combative. Pt remains confused but cooperative at this time. No hallucinations reported. Q2 turn, danielle care, awaiting placement for long term care. Will continue to monitor.      Goal: Individualization and Mutuality  Outcome: Ongoing (interventions implemented as appropriate)    Goal: Discharge Needs Assessment  Outcome: Ongoing (interventions implemented as appropriate)    Goal: Interprofessional Rounds/Family Conf  Outcome: Ongoing (interventions implemented as appropriate)      Problem: Fall Risk (Adult)  Goal: Absence of Fall  Outcome: Ongoing (interventions implemented as appropriate)      Problem: Skin Injury Risk (Adult)  Goal: Skin Health and Integrity  Outcome: Ongoing (interventions implemented as appropriate)

## 2019-04-09 NOTE — PROGRESS NOTES
Subjective   REASON FOR CONSULTATION: Opinion regarding anticoagulation    History of Present Illness  patient is a 68-year-old male with chronic pain and peripheral neuropathy with a spinal stimulator history of seizure disorder and DVT PE during his last admission that we saw during his last admission to the hospital with some abnormal bone imaging of the spine with possible lytic lesions versus hemangioma and also DVT on 10/26/2018 with probable pulmonary embolism on a non-CT angiogram chest CT.  Patient was discharged home with Lovenox to the rehab with plans to follow-up in our office in a month which he never did-he was scheduled to have follow-up bone scans and CAT scans.  We did receive a phone call from the nursing home on 11/26/2018 asking whether he could stop his Lovenox and at that time we recommended continuing this     He is admitted at this time to the hospital with hallucinations and is no longer on Lovenox . there is a note from Dr. Catracho Yo on 12/4/2018 recommended switching to Coumadin but the patient tells me he did not want to deal with this and continue Lovenox until about February when the prescription ran out and he did not refill it because he did not realize that he had to      we are asked to consult on what to do about his anticoagulation.  His repeat Doppler shows chronic right lower extremity DVT in the gastrocnemius everything else was benign and his upper extremities are negative     CT abdomen shows extensive fecal burden in the colon with a transition point at the splenic flexure and colonoscopy is recommended     INTERVAL HISTORY:  4/9  Intermittent confusion  Will repeat CT T spine to evaluate lesions in T spine    Past Medical History, Past Surgical History, Social History, Family History have been reviewed and are without significant changes except as mentioned.    Review of Systems   A comprehensive 14 point review of systems was performed and was negative except as  mentioned.    Medications:  The current medication list was reviewed in the EMR    ALLERGIES:  No Known Allergies    Objective      Vitals:    04/09/19 0014 04/09/19 0018 04/09/19 0549 04/09/19 0836   BP: 98/83   112/76   BP Location: Left arm   Right arm   Patient Position: Lying   Lying   Pulse: (!) 127 118 114 (!) 125   Resp: 18  16 (!) 30  Comment: 30   Temp: 99.5 °F (37.5 °C)   98.9 °F (37.2 °C)   TempSrc: Oral   Oral   SpO2: 94%  95%    Weight:       Height:         No flowsheet data found.    Physical Exam  GENERAL:  Well-developed, well-nourished in no acute distress.  Tremulous  SKIN:  Warm, dry without rashes, purpura or petechiae.  EYES:  Pupils equal, round and reactive to light.  EOMs intact.  Conjunctivae normal.  EARS:  Hearing intact.  NOSE:  Septum midline.  No excoriations or nasal discharge.  MOUTH:  Tongue is well-papillated; no stomatitis or ulcers.  Lips normal.  THROAT:  Oropharynx without lesions or exudates.  NECK:  Supple with good range of motion; no thyromegaly or masses, no JVD.  LYMPHATICS:  No cervical, supraclavicular, axillary or inguinal adenopathy.  CHEST:  Lungs clear to auscultation. Good airflow.  CARDIAC:  Regular rate and rhythm without murmurs, rubs or gallops. Normal S1,S2.  ABDOMEN:  Soft, nontender with no hepatosplenomegaly or masses.  Ramey catheter in place  EXTREMITIES:  No clubbing, cyanosis or edema.  NEUROLOGICAL:  Cranial Nerves II-XII grossly intact.  No focal neurological deficits.  PSYCHIATRIC:  Normal affect and mood.               RECENT LABS:  Hematology WBC   Date Value Ref Range Status   04/09/2019 13.29 (H) 3.40 - 10.80 10*3/mm3 Final     RBC   Date Value Ref Range Status   04/09/2019 2.54 (L) 4.14 - 5.80 10*6/mm3 Final     Hemoglobin   Date Value Ref Range Status   04/09/2019 8.0 (L) 13.0 - 17.7 g/dL Final     Hematocrit   Date Value Ref Range Status   04/09/2019 25.2 (L) 37.5 - 51.0 % Final     Platelets   Date Value Ref Range Status   04/09/2019 215 140  - 450 10*3/mm3 Final     B12 folate normal-SPEP [id number] normal-PSA normal    D-dimer 2.33    Interpretation Summary 4/6/19    · Chronic right lower extremity deep vein thrombosis noted in the gastrocnemius/soleal.  · All other veins appeared normal bilaterally.  · This was a technically difficult study with somewhat limited imaging.               Assessment/Plan   1. Nonspecific bone lesions on spine CT:   · Unable to obtain MRI due to spinal cord stimulator  · 10/24/18 CT cervical spine with multiple small lytic lesions (3-5 millimeters), CT thoracic spine with similar lesions, largest T2 6 millimeters, none evident on CT lumbar spine.  Lesions indeterminate in nature, benign versus malignant.  · Skeletal survey 10/25/18 with no visible lytic or blastic lesions  · CT chest abdomen pelvis 10/25/18 with suspicion of small pulmonary emboli, possible right femoral vein DVT, distention of proximal and mid:, Lobulated lesion inferior margin bladder, possibly related to prostate, redemonstration of lucent foci in thoracic spine, indeterminate.    · Labs 10/25/18 with PSA 1.05, negative serum protein electrophoresis and immunoelectrophoresis with normal quantitative immunoglobulins, normal free light chain ratio.  · Elevated chromogranin a 10/25/18 of unclear significance (value of 8 just above normal range).  24 hour urine 5-HIAA pending  · Bone lesions of unclear significance at this time, anticipate interval follow-up on CT at 2-3 months.  Will require future cystoscopy and colonoscopy for evaluation of potential primary malignancy.  Colonoscopy anticipated in approximately 4 weeks by GI once patient has completed treatment for C. difficile colitis and recovered.  2. Anemia:   · B12, folic acid levels are normal.  Iron studies consistent with chronic disease.      · Stool positive for occult blood, no clinical evidence of GI blood loss however.  · Hemoglobin steadily dropping down to 8.0?  Bleeding versus  hydration and dilution  ·  anticoagulation-low-dose Lovenox  · -SPEP negative    3. Left lower lobe pulmonary embolism, right lower extremity femoral/calf DVT 10/25/18:   · Non-angiogram CT chest 10/25/18 with filling defect left lower lobe pulmonary artery and suspicion for femoral DVT on the right.    · Lower extremity Doppler ultrasound 10/26/18 did confirm extensive deep venous thrombosis in the right leg from the femoral vein down into the calf.   · Lower extremity Doppler with notation of right mid calf mass 2 cm of unclear significance or etiology.  Consider repeat Doppler in future.  · Patient still not very mobile spine rehab after he was hospitalized and immobilized with C. difficile colitis,   · Continuing back on Lovenox but patient discontinued 2 months ago when he ran out of medications and repeat Doppler shows no acute sbsf-[id number]-hdefdb pending but he is at high risk for DVT again because of his immobility.  Coumadin is a safest oral drug for him but there is no low-dose option therefore I would recommend low-dose Lovenox prophylaxis until he is mobile again  ·   4.Renal insufficiency:  · Patient with fluctuating creatinine ranging from 0.9 up to 1.8 in the past few months  · Today, creatinine improved at 1.11     5.  .Seizure disorder  · On long-term treatment with Dilantin, follows with Dr. Yo as outpatient  · Does not want to change medication  · Interferes with xarelto/eliquis     6. Peripheral neuropathy, chronic low back pain, spinal cord stimulator revision 8/27/18     Plan     1.  Repeat CT of the thoracic spine to follow-up on bone lesions  (MRI cannot be done because of the stimulator)  2.  .  Patient has had almost 5 months of treatment for his DVT and PE at this point I think it would be reasonable to put him on prophylactic dose of 40 mg Lovenox daily to avoid interactions with his Dilantin and the other oral medications until he is up and about especially with the elevated d-dimer     We  will follow briefly                                 4/9/2019      CC:

## 2019-04-09 NOTE — PLAN OF CARE
Problem: Patient Care Overview  Goal: Plan of Care Review  Outcome: Ongoing (interventions implemented as appropriate)   04/09/19 1430   Coping/Psychosocial   Plan of Care Reviewed With patient   Plan of Care Review   Progress no change   OTHER   Outcome Summary Transfered to a air loss mattress, still having boughts of confusion, and feeling better. He verbalized that he doesn't know why he is having them, but he states that he remembers being confused. Ramey still in place.        Problem: Fall Risk (Adult)  Goal: Absence of Fall  Outcome: Ongoing (interventions implemented as appropriate)      Problem: Mobility, Physical Impaired (Adult)  Goal: Identify Related Risk Factors and Signs and Symptoms  Outcome: Outcome(s) achieved Date Met: 04/09/19    Goal: Enhanced Mobility Skills  Outcome: Ongoing (interventions implemented as appropriate)    Goal: Enhanced Functional Ability  Outcome: Ongoing (interventions implemented as appropriate)

## 2019-04-09 NOTE — NURSING NOTE
Dr. Leone wants to get a CT scan of his cervical and upper thoracic spine.  Trying to call CT scan to confirm but no luck.      Patient was oriented for the majority of the day until around 1500 when he fell asleep, and has been confused/acut delirium every since.  He was unable to eat dinner nor go down for his CT right now.  Will continue to try to orient the patient.

## 2019-04-10 NOTE — PROGRESS NOTES
[facility]     LOS: 3 days     Subjective:    CC:Leg Pain (C/O BILAT. LEG PAIN; PT WITH OF CHRONIC PAIN AND IS FOLLOWED BY A PAIN MD) and Altered Mental Status (PT REPORTS WAKING THIS AM CONFUSED, PT AA&O x4 NOW; PT RECENTLY MOVED IN WITH HIS SON AND SON REPORTS THAT HE CAN'T TAKE CARE OF HIM ANYMORE)    DIET:  Diet Order   Procedures   • Diet Regular     No cp  No soa  No n/v/d      Objective:    Vital Signs:  Temp:  [97.5 °F (36.4 °C)-98.2 °F (36.8 °C)] 98.2 °F (36.8 °C)  Heart Rate:  [] 102  Resp:  [16-20] 20  BP: (105-161)/(58-85) 112/58    SpO2:  [90 %-95 %] 90 %  on   ;   Device (Oxygen Therapy): room air  Body mass index is 38.77 kg/m².    Physical Exam   Constitutional: He appears well-developed and well-nourished. No distress.   HENT:   Head: Normocephalic and atraumatic.   Cardiovascular: Exam reveals no friction rub.   No murmur heard.  Pulmonary/Chest: Effort normal and breath sounds normal.   Abdominal: Soft. Bowel sounds are normal. He exhibits no distension. There is no tenderness.   Neurological: He is alert.   Mental status back to baseline.  Answering questions appropriately.  Oriented x3   Skin: Skin is warm and dry.       Results Review:    Glucose   Date Value Ref Range Status   04/10/2019 108 (H) 65 - 99 mg/dL Final   04/09/2019 118 (H) 65 - 99 mg/dL Final   04/08/2019 125 (H) 65 - 99 mg/dL Final     Results from last 7 days   Lab Units 04/10/19  0344   WBC 10*3/mm3 10.44   HEMOGLOBIN g/dL 7.3*   HEMATOCRIT % 23.5*   PLATELETS 10*3/mm3 201     Results from last 7 days   Lab Units 04/10/19  0344  04/06/19  1200   SODIUM mmol/L 140   < > 140   POTASSIUM mmol/L 4.3   < > 4.0   CHLORIDE mmol/L 103   < > 100   CO2 mmol/L 25.1   < > 22.7   BUN mg/dL 22   < > 28*   CREATININE mg/dL 1.08   < > 1.55*   CALCIUM mg/dL 8.8   < > 9.6   BILIRUBIN mg/dL  --   --  0.3   ALK PHOS U/L  --   --  60   ALT (SGPT) U/L  --   --  25   AST (SGOT) U/L  --   --  22   GLUCOSE mg/dL 108*    < > 122*    < > = values in this interval not displayed.             Results from last 7 days   Lab Units 04/10/19  0344 04/06/19 2009 04/06/19  1200   CK TOTAL U/L 910*  --   --    TROPONIN T ng/mL  --  0.016 0.015     Cultures:  Blood Culture   Date Value Ref Range Status   04/06/2019 No growth at 24 hours  Preliminary   04/06/2019 No growth at less than 24 hours  Preliminary       I have reviewed daily medications and changes in CPOE    Scheduled meds    aspirin 650 mg Oral Daily   DULoxetine 120 mg Oral Nightly   enoxaparin 40 mg Subcutaneous Q24H   finasteride 5 mg Oral Daily   hydrophor  Topical Q12H   levothyroxine 88 mcg Oral Q AM   phenytoin 500 mg Oral Nightly   polyethylene glycol 17 g Oral TID   sodium chloride 3 mL Intravenous Q12H   thiamine 100 mg Oral Daily          PRN meds  •  acetaminophen  •  bisacodyl  •  HYDROcodone-acetaminophen  •  OLANZapine  •  [COMPLETED] Insert peripheral IV **AND** sodium chloride  •  sodium chloride        Lumbar radiculopathy    Acquired hypothyroidism    Hallucinations    Hx pulmonary embolism    Seizure disorder (CMS/HCC)    Leukocytosis        Assessment/Plan:    Metabolic encephalopathy-inpatient neurology and recommendation is made to follow-up with Dr. Yo outpatient  -tsh 5.28, b12 335, folate 18  -hold lyrica  -Mental status is back to baseline    Anemia-[facility] group is following and possible biopsy also hemolysis workup  -No evidence of blood loss, though his urine is dark    Chronic pain  -Oral pain medication as needed  -He has a pain pump     H/o PE- and had clot in 10/2018  -not currently on anticoagulation  -No blood clots on lower extremity Doppler  -D-dimer is highly positive   -change to low-dose anticoagulation  -Patient cannot take Eliquis or Xarelto because of Dilantin     Seizures-dilantin  -no seizures in 15 years ago  -Doubt this is seizures causing his admission  -Dilantin level is okay, no nystagmus on exam     hypothyroidism continue  Synthroid     Acute renal failure  -high PVR, danielle in place, urology is following    DVT PPX: low dose lovenox    Maybe d/c 1-2 days    Jus Barreto MD  04/10/19  3:50 PM

## 2019-04-10 NOTE — PLAN OF CARE
Problem: Patient Care Overview  Goal: Plan of Care Review  Outcome: Ongoing (interventions implemented as appropriate)   04/10/19 0916   Coping/Psychosocial   Plan of Care Reviewed With patient   Plan of Care Review   Progress improving   OTHER   Outcome Summary Pt tolerated treatment fair this date. Required max A x2 for bed mobility. Maintained static sitting balance for ~5min before requesting to lie back down d/t increased back pain. PT will continue to continue to address functional mobility deficits as tolerated.

## 2019-04-10 NOTE — THERAPY TREATMENT NOTE
Acute Care - Physical Therapy Treatment Note  [facility]     Patient Name: Chava Stone Sr.  : 1950  MRN: [id number]  Today's Date: 4/10/2019  Onset of Illness/Injury or Date of Surgery: 19  Date of Referral to PT: 19  Referring Physician: Mary Lou    Admit Date: 2019    Visit Dx:    ICD-10-CM ICD-9-CM   1. Hallucinations R44.3 780.1   2. General weakness R53.1 780.79   3. Leg pain, bilateral M79.604 729.5    M79.605    4. Impaired mobility Z74.09 799.89     Patient Active Problem List   Diagnosis   • Spinal stenosis of lumbar region   • Lumbar radiculopathy   • Spinal cord stimulator status   • Pain in both lower extremities   • Chronic pain   • Postlaminectomy syndrome of lumbar region   • Benign essential hypertension   • Colon polyp   • Diverticulosis of large intestine   • Elevated prostate specific antigen (PSA)   • Psychomotor epilepsy (CMS/HCC)   • Hyperprolactinemia (CMS/HCC)   • Hypogonadism in male   • Acquired hypothyroidism   • Malignant neoplasm of prostate (CMS/HCC)   • Solitary pulmonary nodule   • Vitamin D deficiency   • Battery end of life of spinal cord stimulator   • Weakness   • Peripheral neuropathy   • Diarrhea   • C. difficile colitis   • Pulmonary embolus (CMS/HCC)   • Acute deep vein thrombosis (DVT) of distal end of right lower extremity (CMS/HCC)   • Hallucinations   • Hx pulmonary embolism   • Seizure disorder (CMS/HCC)   • Leukocytosis       Therapy Treatment    Rehabilitation Treatment Summary     Row Name 04/10/19 0899             Treatment Time/Intention    Discipline  physical therapy assistant  -SM      Document Type  therapy note (daily note)  -SM      Subjective Information  complains of;weakness;fatigue;pain  -SM      Mode of Treatment  physical therapy  -SM      Patient Effort  adequate  -SM      Existing Precautions/Restrictions  fall  -SM      Recorded by [SM] Yoana Kenney, KENYA 04/10/19 0914      Row Name 04/10/19 5168             Cognitive  Assessment/Intervention    Additional Documentation  Cognitive Assessment/Intervention (Group)  -SM      Recorded by [] Yoana Kenney [facility] 04/10/19 0916      Row Name 04/10/19 0830             Cognitive Assessment/Intervention- PT/OT    Orientation Status (Cognition)  oriented to;person;place  -SM      Follows Commands (Cognition)  follows one step commands  -SM      Safety Deficit (Cognitive)  moderate deficit  -      Personal Safety Interventions  fall prevention program maintained  -SM      Recorded by [] Yoana Kenney [facility] 04/10/19 0916      Row Name 04/10/19 0830             Bed Mobility Assessment/Treatment    Bed Mobility Assessment/Treatment  supine-sit;sit-supine  -SM      Supine-Sit [place] (Bed Mobility)  maximum assist (25% patient effort);2 person assist  -      Sit-Supine [place] (Bed Mobility)  maximum assist (25% patient effort);2 person assist  -SM      Bed Mobility, Safety Issues  decreased use of arms for pushing/pulling;decreased use of legs for bridging/pushing  -      Assistive Device (Bed Mobility)  bed rails;draw sheet;head of bed elevated  -      Comment (Bed Mobility)  increased back pain w/ mobility  -SM      Recorded by [] Yoana Kenney [facility] 04/10/19 0916      Row Name 04/10/19 0830             Transfer Assessment/Treatment    Comment (Transfers)  NT - increased pain in LBP and weakness in LEs  -SM      Recorded by [] Yoana Kenney [facility] 04/10/19 0916      Row Name 04/10/19 0830             Motor Skills Assessment/Interventions    Additional Documentation  Therapeutic Exercise (Group)  -SM      Recorded by [] Yoana Kenney PTA 04/10/19 0916      Row Name 04/10/19 0830             Therapeutic Exercise    Lower Extremity Range of Motion (Therapeutic Exercise)  ankle dorsiflexion/plantar flexion, bilateral  -      Exercise Type (Therapeutic Exercise)  AROM (active range of motion)  -      Position (Therapeutic Exercise)  seated   -SM      Sets/Reps (Therapeutic Exercise)  10 reps  -SM      Recorded by [] Yoana Kenney, [facility] 04/10/19 0916      Row Name 04/10/19 0830             Positioning and Restraints    Pre-Treatment Position  in bed  -SM      Post Treatment Position  bed  -SM      In Bed  supine;call light within reach;encouraged to call for assist;exit alarm on  -SM      Recorded by [] Yoana Kenneyee, [facility] 04/10/19 0916      Row Name 04/10/19 0830             Pain Assessment    Additional Documentation  Pain Scale: Word Pre/Post-Treatment (Group)  -SM      Recorded by [] Yoana Kenney Norma, [facility] 04/10/19 0916      Row Name 04/10/19 0830             Pain Scale: Numbers Pre/Post-Treatment    Pain Location - Orientation  lower  -SM      Pain Location  back  -SM      Pain Intervention(s)  Repositioned;Rest  -SM      Recorded by [] Yoana Kenneyee, [facility] 04/10/19 0916      Row Name 04/10/19 0830             Pain Scale: Word Pre/Post-Treatment    Pain: Word Scale, Pretreatment  4 - moderate pain  -SM      Pain: Word Scale, Post-Treatment  8 - severe pain  -SM      Recorded by [] Yoana Kenney Norma, [facility] 04/10/19 0916        User Key  (r) = Recorded By, (t) = Taken By, (c) = Cosigned By    Initials Name Effective Dates Discipline     Yoana Kenney, [facility] 03/07/18 -  PT                   Physical Therapy Education     Title: PT OT SLP Therapies (In Progress)     Topic: Physical Therapy (In Progress)     Point: Mobility training (In Progress)     Learning Progress Summary           Patient Acceptance, E,D, NR by  at 4/10/2019  9:16 AM    Acceptance, E, NR by  at 4/8/2019  2:09 PM    Nonacceptance, E, NR by  at 4/6/2019  6:44 PM                   Point: Home exercise program (In Progress)     Learning Progress Summary           Patient Acceptance, E,D, NR by  at 4/10/2019  9:16 AM    Acceptance, E, NR by  at 4/8/2019  2:09 PM                   Point: Body mechanics (In Progress)     Learning Progress Summary            Patient Acceptance, E,D, NR by  at 4/10/2019  9:16 AM    Acceptance, E, NR by  at 4/8/2019  2:09 PM                   Point: Precautions (In Progress)     Learning Progress Summary           Patient Acceptance, E,D, NR by  at 4/10/2019  9:16 AM    Acceptance, E, NR by  at 4/8/2019  2:09 PM                               User Key     Initials Effective Dates Name Provider Type Discipline     04/03/18 -  Anastasia Fatima, PT Physical Therapist PT     03/07/18 -  Yoana Kenney PTA Physical Therapy Assistant PT     06/09/17 -  Savanna Talbert RN Registered Nurse Nurse                PT Recommendation and Plan     Plan of Care Reviewed With: patient  Progress: improving  Outcome Summary: Pt tolerated treatment fair this date. Required max A x2 for bed mobility. Maintained static sitting balance for ~5min before requesting to lie back down d/t increased back pain. PT will continue to continue to address functional mobility deficits as tolerated.  Outcome Measures     Row Name 04/10/19 0900 04/08/19 1400          How much help from another person do you currently need...    Turning from your back to your side while in flat bed without using bedrails?  2  -  2  -LH     Moving from lying on back to sitting on the side of a flat bed without bedrails?  2  -  1  -LH     Moving to and from a bed to a chair (including a wheelchair)?  1  -  1  -LH     Standing up from a chair using your arms (e.g., wheelchair, bedside chair)?  1  -  1  -LH     Climbing 3-5 steps with a railing?  1  -  1  -LH     To walk in hospital room?  1  -  1  -     AM-PAC 6 Clicks Score  8  -  7  -        Functional Assessment    Outcome Measure Options  AM-PAC 6 Clicks Basic Mobility (PT)  -  AM-PAC 6 Clicks Basic Mobility (PT)  -       User Key  (r) = Recorded By, (t) = Taken By, (c) = Cosigned By    Initials Name Provider Type     Anastasia Fatima, PT Physical Therapist     Yoana Kenney PTA  Physical Therapy Assistant         Time Calculation:   PT Charges     Row Name 04/10/19 0919             Time Calculation    Start Time  0830  -      Stop Time  0853  -      Time Calculation (min)  23 min  -      PT Received On  04/10/19  -      PT - Next Appointment  04/12/19  -        User Key  (r) = Recorded By, (t) = Taken By, (c) = Cosigned By    Initials Name Provider Type     Yoana Kenney PTA Physical Therapy Assistant        Therapy Charges for Today     Code Description Service Date Service Provider Modifiers Qty    99998613415 HC PT THER PROC EA 15 MIN 4/10/2019 Yoana Kenney PTA GP 2    25728978840 HC PT THER SUPP EA 15 MIN 4/10/2019 Yoana Kenney PTA GP 1          PT G-Codes  Outcome Measure Options: AM-PAC 6 Clicks Basic Mobility (PT)  AM-PAC 6 Clicks Score: 8    Yoana Kenney PTA  4/10/2019

## 2019-04-10 NOTE — PROGRESS NOTES
[facility]     LOS: 2 days     Subjective:    CC:Leg Pain (C/O BILAT. LEG PAIN; PT WITH OF CHRONIC PAIN AND IS FOLLOWED BY A PAIN MD) and Altered Mental Status (PT REPORTS WAKING THIS AM CONFUSED, PT AA&O x4 NOW; PT RECENTLY MOVED IN WITH HIS SON AND SON REPORTS THAT HE CAN'T TAKE CARE OF HIM ANYMORE)    DIET:  Diet Order   Procedures   • Diet Regular     Not a reliable historian this afternoon      Objective:    Vital Signs:  Temp:  [97.5 °F (36.4 °C)-99.5 °F (37.5 °C)] 97.5 °F (36.4 °C)  Heart Rate:  [] 94  Resp:  [16-30] 16  BP: ()/(76-85) 105/77    SpO2:  [91 %-96 %] 91 %  on   ;   Device (Oxygen Therapy): room air  Body mass index is 38.77 kg/m².    Physical Exam   Constitutional: He appears well-developed and well-nourished. No distress.   HENT:   Head: Normocephalic and atraumatic.   Cardiovascular: Exam reveals no friction rub.   No murmur heard.  Pulmonary/Chest: Effort normal and breath sounds normal.   Abdominal: Soft. Bowel sounds are normal. He exhibits no distension. There is no tenderness.   Neurological: He is alert.   More confused today on my exam but according to the nurse patient was doing reasonably well this morning   Skin: Skin is warm and dry.       Results Review:    Glucose   Date Value Ref Range Status   04/09/2019 118 (H) 65 - 99 mg/dL Final   04/08/2019 125 (H) 65 - 99 mg/dL Final   04/07/2019 83 65 - 99 mg/dL Final     Results from last 7 days   Lab Units 04/09/19  0337   WBC 10*3/mm3 13.29*   HEMOGLOBIN g/dL 8.0*   HEMATOCRIT % 25.2*   PLATELETS 10*3/mm3 215     Results from last 7 days   Lab Units 04/09/19  0337  04/06/19  1200   SODIUM mmol/L 141   < > 140   POTASSIUM mmol/L 3.8   < > 4.0   CHLORIDE mmol/L 102   < > 100   CO2 mmol/L 19.5*   < > 22.7   BUN mg/dL 21   < > 28*   CREATININE mg/dL 1.11   < > 1.55*   CALCIUM mg/dL 8.9   < > 9.6   BILIRUBIN mg/dL  --   --  0.3   ALK PHOS U/L  --   --  60   ALT (SGPT) U/L  --   --  25   AST (SGOT)  U/L  --   --  22   GLUCOSE mg/dL 118*   < > 122*    < > = values in this interval not displayed.             Results from last 7 days   Lab Units 04/06/19 2009 04/06/19  1200   TROPONIN T ng/mL 0.016 0.015     Cultures:  Blood Culture   Date Value Ref Range Status   04/06/2019 No growth at 24 hours  Preliminary   04/06/2019 No growth at less than 24 hours  Preliminary       I have reviewed daily medications and changes in CPOE    Scheduled meds    aspirin 650 mg Oral Daily   DULoxetine 120 mg Oral Nightly   enoxaparin 40 mg Subcutaneous Q24H   finasteride 5 mg Oral Daily   hydrophor  Topical Q12H   levothyroxine 88 mcg Oral Q AM   phenytoin 500 mg Oral Nightly   sodium chloride 3 mL Intravenous Q12H   [START ON 4/10/2019] thiamine 100 mg Oral Daily          PRN meds  •  acetaminophen  •  HYDROcodone-acetaminophen  •  OLANZapine  •  [COMPLETED] Insert peripheral IV **AND** sodium chloride  •  sodium chloride        Lumbar radiculopathy    Acquired hypothyroidism    Hallucinations    Hx pulmonary embolism    Seizure disorder (CMS/HCC)    Leukocytosis        Assessment/Plan:    Metabolic encephalopathy-inpatient neurology and recommendation is made to follow-up with Dr. Yo outpatient  -tsh 5.28, b12 335, folate 18  -hold lyrica  -Improved yesterday slightly more confused this afternoon.  The nurse states that the patient was better this morning     Chronic pain  -Oral pain medication as needed  -He has a pain pump     H/o PE- and had clot in 10/2019  -not currently on anticoagulation  -No blood clots on lower extremity Doppler  -D-dimer is highly positive  -change to low-dose anticoagulation  -Patient cannot take Eliquis or Xarelto because of Dilantin     Seizures-dilantin  -no seizures in 15 years ago  -Doubt this is seizures causing his admission  -Dilantin level is okay, no nystagmus on exam     hypothyroidism continue Synthroid     Acute renal failure-IV fluids overnight  -high PVR  -place danielle and see if the  renal function improves    Anemia-check labs, seen by [facility], monitor    DVT PPX: low dose lovenox    Maybe d/c 1-2 days    Jus Barreto MD  04/09/19  11:36 PM

## 2019-04-10 NOTE — PROGRESS NOTES
Subjective   REASON FOR CONSULTATION: Opinion regarding anticoagulation    History of Present Illness  patient is a 68-year-old male with chronic pain and peripheral neuropathy with a spinal stimulator history of seizure disorder and DVT PE during his last admission that we saw during his last admission to the hospital with some abnormal bone imaging of the spine with possible lytic lesions versus hemangioma and also DVT on 10/26/2018 with probable pulmonary embolism on a non-CT angiogram chest CT.  Patient was discharged home with Lovenox to the rehab with plans to follow-up in our office in a month which he never did-he was scheduled to have follow-up bone scans and CAT scans.  We did receive a phone call from the nursing home on 11/26/2018 asking whether he could stop his Lovenox and at that time we recommended continuing this     He is admitted at this time to the hospital with hallucinations and is no longer on Lovenox . there is a note from Dr. Catracho Yo on 12/4/2018 recommended switching to Coumadin but the patient tells me he did not want to deal with this and continue Lovenox until about February when the prescription ran out and he did not refill it because he did not realize that he had to      we are asked to consult on what to do about his anticoagulation.  His repeat Doppler shows chronic right lower extremity DVT in the gastrocnemius everything else was benign and his upper extremities are negative     CT abdomen shows extensive fecal burden in the colon with a transition point at the splenic flexure and colonoscopy is recommended     INTERVAL HISTORY:  4/9  Intermittent confusion  Creatinine 1.1  Will repeat CT T spine to evaluate lesions in T spine    4/10  Awake alert afebrile intermittent tachycardia-intermittent confusion-no bowel movement since admission  Hemoglobin is dropping steadily with dark urine?  hemolysis  CAT scan shows stable small lytic lesions in the C-spine and T-spine etiology  unclear  Discussed bone marrow biopsy the patient is agreeable we will proceed and also check for hemolysis    Past Medical History, Past Surgical History, Social History, Family History have been reviewed and are without significant changes except as mentioned.    Review of Systems   A comprehensive 14 point review of systems was performed and was negative except as mentioned.    Medications:  The current medication list was reviewed in the EMR    ALLERGIES:  No Known Allergies    Objective      Vitals:    04/09/19 1938 04/09/19 2042 04/09/19 2300 04/10/19 0737   BP: 161/85 161/85 105/77 145/74   BP Location: Right arm Right arm Right arm Right arm   Patient Position: Lying Lying Lying Lying   Pulse: 98 98 94 98   Resp: 16 16 16 20   Temp:  98.1 °F (36.7 °C) 97.5 °F (36.4 °C) 97.5 °F (36.4 °C)   TempSrc: Oral Oral Oral Oral   SpO2: 93% 93% 91% 90%   Weight:       Height:         No flowsheet data found.    Physical Exam  GENERAL:  Well-developed, well-nourished in no acute distress.  Tremulous  SKIN:  Warm, dry without rashes, purpura or petechiae.  EYES:  Pupils equal, round and reactive to light.  EOMs intact.  Conjunctivae normal.  EARS:  Hearing intact.  NOSE:  Septum midline.  No excoriations or nasal discharge.  MOUTH:  Tongue is well-papillated; no stomatitis or ulcers.  Lips normal.  THROAT:  Oropharynx without lesions or exudates.  NECK:  Supple with good range of motion; no thyromegaly or masses, no JVD.  LYMPHATICS:  No cervical, supraclavicular, axillary or inguinal adenopathy.  CHEST:  Lungs clear to auscultation. Good airflow.  CARDIAC:  Regular rate and rhythm without murmurs, rubs or gallops. Normal S1,S2.  ABDOMEN:  Soft, firm distended nontender with no hepatosplenomegaly or masses.  Ramey catheter in place  EXTREMITIES:  No clubbing, cyanosis or edema.  NEUROLOGICAL:  Cranial Nerves II-XII grossly intact.  No focal neurological deficits.  PSYCHIATRIC:  Normal affect and mood.               RECENT  LABS:  Hematology WBC   Date Value Ref Range Status   04/10/2019 10.44 3.40 - 10.80 10*3/mm3 Final     RBC   Date Value Ref Range Status   04/10/2019 2.34 (L) 4.14 - 5.80 10*6/mm3 Final     Hemoglobin   Date Value Ref Range Status   04/10/2019 7.3 (L) 13.0 - 17.7 g/dL Final     Hematocrit   Date Value Ref Range Status   04/10/2019 23.5 (L) 37.5 - 51.0 % Final     Platelets   Date Value Ref Range Status   04/10/2019 201 140 - 450 10*3/mm3 Final     B12 folate normal-SPEP [id number] normal-PSA normal    D-dimer 2.33    Interpretation Summary 4/6/19    · Chronic right lower extremity deep vein thrombosis noted in the gastrocnemius/soleal.  · All other veins appeared normal bilaterally.  · This was a technically difficult study with somewhat limited imaging.        CT CERVICAL SPINE  IMPRESSION:  1. Multilevel degenerative and arthritic changes appear similar to  previous, no acute fracture or abnormal enhancement.  2. Stable tiny lytic lesions which again may be related to myeloma or  metastatic disease                                              CT THORACIC SPINE     IMPRESSION:  1. Multilevel degenerative changes are similar to previous with minimal  scoliosis and no acute osseous abnormality.  2. Tiny nonspecific lytic lesions are stable from previous, there is no  abnormal enhancement.         Assessment/Plan   1. Nonspecific bone lesions on spine CT:   · Unable to obtain MRI due to spinal cord stimulator  · 10/24/18 CT cervical spine with multiple small lytic lesions (3-5 millimeters), CT thoracic spine with similar lesions, largest T2 6 millimeters, none evident on CT lumbar spine.  Lesions indeterminate in nature, benign versus malignant.  · Skeletal survey 10/25/18 with no visible lytic or blastic lesions  · CT chest abdomen pelvis 10/25/18 with suspicion of small pulmonary emboli, possible right femoral vein DVT, distention of proximal and mid:, Lobulated lesion inferior margin bladder, possibly related  to prostate, redemonstration of lucent foci in thoracic spine, indeterminate.    · Labs 10/25/18 with PSA 1.05, negative serum protein electrophoresis and immunoelectrophoresis with normal quantitative immunoglobulins, normal free light chain ratio.  2. Elevated chromogranin a 10/25/18 of unclear significance (value of 8 just above normal range).  24 hour urine 5-HIAA normal   · Bone lesions of unclear significance at this time,-could be nonsecretory myeloma we will proceed with bone marrow evaluation  3. Anemia:   · B12, folic acid levels are normal.  Iron studies consistent with chronic disease.      · Stool positive for occult blood, no clinical evidence of GI blood loss however.  · Hemoglobin steadily dropping down to 8.0?  Bleeding versus hydration and dilution  ·  anticoagulation-low-dose Lovenox  · -SPEP negative  · Hemoglobin dropping steadily over the last 3 days with dark urine?  Hemolysis    3. Left lower lobe pulmonary embolism, right lower extremity femoral/calf DVT 10/25/18:   · Non-angiogram CT chest 10/25/18 with filling defect left lower lobe pulmonary artery and suspicion for femoral DVT on the right.    · Lower extremity Doppler ultrasound 10/26/18 did confirm extensive deep venous thrombosis in the right leg from the femoral vein down into the calf.   · Lower extremity Doppler with notation of right mid calf mass 2 cm of unclear significance or etiology.  Consider repeat Doppler in future.  · Patient still not very mobile spine rehab after he was hospitalized and immobilized with C. difficile colitis,   ·  on Lovenox but patient discontinued 2 months ago when he ran out of medications and repeat Doppler shows no acute [id number] pending but he is at high risk for DVT again because of his immobility.  Coumadin is a safest oral drug for him but there is no low-dose option therefore I would recommend low-dose Lovenox prophylaxis until he is mobile again  ·   4.Renal insufficiency:  · Patient with  fluctuating creatinine ranging from 0.9 up to 1.8 in the past few months  · Today, creatinine improved at 1.11     5.  .Seizure disorder  · On long-term treatment with Dilantin, follows with Dr. Yo as outpatient  · Does not want to change medication  · Interferes with xarelto/eliquis     6. Peripheral neuropathy, chronic low back pain, spinal cord stimulator revision 8/27/18   7.  Constipation  Plan     1.    Bone marrow biopsy -discussed at length with patient and he is agreeable.  It might help explain some of his back pain although I doubt it     2.  .  Patient has had almost 5 months of treatment for his DVT and PE at this point I think it would be reasonable to put him on prophylactic dose of 40 mg Lovenox daily to avoid interactions with his Dilantin and the other oral medications until he is up and about especially with the elevated d-dimer   3.  Hemolysis workup for dropping hemoglobin  We will follow                                  4/10/2019      CC:

## 2019-04-10 NOTE — PROGRESS NOTES
Continued Stay Note  [facility]     Patient Name: Chava Stone Sr.  MRN: [id number]  Today's Date: 4/10/2019    Admit Date: 4/6/2019    Discharge Plan     Row Name 04/10/19 1343       Plan    Plan  Skilled rehab with long term care to follow    Patient/Family in Agreement with Plan  yes    Plan Comments  Spoke with [facility] [employer] liaison,  She states that facility is out of network with his insurance.  He would have a copay for skilled rehab for the first 30 days with a range of .00 then eligible for Medicaid pending if he spends down his assets.  Awaitaing referral results from 1st choice of [facility]............................Muna Ferguson RN        Discharge Codes    No documentation.             Muna Ferugson RN

## 2019-04-10 NOTE — PLAN OF CARE
Problem: Patient Care Overview  Goal: Plan of Care Review  Outcome: Ongoing (interventions implemented as appropriate)   04/10/19 0136   Coping/Psychosocial   Plan of Care Reviewed With patient   Plan of Care Review   Progress improving   OTHER   Outcome Summary No significant changes overnight. Pt A/O for this shift. Premedicated w/dilaudid for back pain prior to sending him for CT of cervical and thoracic spine. Gave [place] @ [facility] for pain. Still w/ spastic/tremulous movements.No BM since the 6th. Urine still brown, but lightening up. FC care. Turning Q2H. Z guard on bottom. VSS. Afebrile. NSR.      Goal: Individualization and Mutuality  Outcome: Ongoing (interventions implemented as appropriate)    Goal: Discharge Needs Assessment  Outcome: Ongoing (interventions implemented as appropriate)    Goal: Interprofessional Rounds/Family Conf  Outcome: Ongoing (interventions implemented as appropriate)      Problem: Fall Risk (Adult)  Goal: Absence of Fall  Outcome: Ongoing (interventions implemented as appropriate)      Problem: Skin Injury Risk (Adult)  Goal: Skin Health and Integrity  Outcome: Ongoing (interventions implemented as appropriate)      Problem: Mobility, Physical Impaired (Adult)  Goal: Enhanced Mobility Skills  Outcome: Ongoing (interventions implemented as appropriate)    Goal: Enhanced Functional Ability  Outcome: Ongoing (interventions implemented as appropriate)

## 2019-04-10 NOTE — PLAN OF CARE
Problem: Patient Care Overview  Goal: Plan of Care Review  Outcome: Ongoing (interventions implemented as appropriate)   04/10/19 1401   Coping/Psychosocial   Plan of Care Reviewed With patient   Plan of Care Review   Progress no change   OTHER   Outcome Summary Patient currently oriented this AM, recieved miralax and supposity trying to get a occult stool sample, and Dr. Leone is planning on a bone marrow biopsy tomorrow. AM labs ordered and lovenox needs to be held until complete.        Problem: Fall Risk (Adult)  Goal: Absence of Fall  Outcome: Ongoing (interventions implemented as appropriate)      Problem: Skin Injury Risk (Adult)  Goal: Skin Health and Integrity  Outcome: Ongoing (interventions implemented as appropriate)      Problem: Mobility, Physical Impaired (Adult)  Goal: Enhanced Mobility Skills  Outcome: Ongoing (interventions implemented as appropriate)    Goal: Enhanced Functional Ability  Outcome: Ongoing (interventions implemented as appropriate)

## 2019-04-11 NOTE — PROGRESS NOTES
Subjective   REASON FOR CONSULTATION: Opinion regarding anticoagulation    Leg Pain      Altered Mental Status       patient is a 68-year-old male with chronic pain and peripheral neuropathy with a spinal stimulator history of seizure disorder and DVT PE during his last admission that we saw during his last admission to the hospital with some abnormal bone imaging of the spine with possible lytic lesions versus hemangioma and also DVT on 10/26/2018 with probable pulmonary embolism on a non-CT angiogram chest CT.  Patient was discharged home with Lovenox to the rehab with plans to follow-up in our office in a month which he never did-he was scheduled to have follow-up bone scans and CAT scans.  We did receive a phone call from the nursing home on 11/26/2018 asking whether he could stop his Lovenox and at that time we recommended continuing this     He is admitted at this time to the hospital with hallucinations and is no longer on Lovenox . there is a note from Dr. Catracho Yo on 12/4/2018 recommended switching to Coumadin but the patient tells me he did not want to deal with this and continue Lovenox until about February when the prescription ran out and he did not refill it because he did not realize that he had to      we are asked to consult on what to do about his anticoagulation.  His repeat Doppler shows chronic right lower extremity DVT in the gastrocnemius everything else was benign and his upper extremities are negative     CT abdomen shows extensive fecal burden in the colon with a transition point at the splenic flexure and colonoscopy is recommended     INTERVAL HISTORY:  4/9  Intermittent confusion  Creatinine 1.1  Will repeat CT T spine to evaluate lesions in T spine    4/10  Awake alert afebrile intermittent tachycardia-intermittent confusion-no bowel movement since admission  Hemoglobin is dropping steadily with dark urine?  hemolysis  CAT scan shows stable small lytic lesions in the C-spine and  T-spine etiology unclear  Discussed bone marrow biopsy the patient is agreeable we will proceed and also check for hemolysis    4/11  Feels the same today hemoglobin higher at 8.3-haptoglobin normal reticulocyte is 3.6% LDH mildly elevated doubt hemolysis  Scheduled for bone marrow biopsy today  Will follow can be discharged to nursing home before results are out-    Past Medical History, Past Surgical History, Social History, Family History have been reviewed and are without significant changes except as mentioned.    Review of Systems   A comprehensive 14 point review of systems was performed and was negative except as mentioned.    Medications:  The current medication list was reviewed in the EMR    ALLERGIES:  No Known Allergies    Objective      Vitals:    04/10/19 2040 04/10/19 2300 04/10/19 2353 04/11/19 0742   BP: 112/68 112/68 122/68 140/73   BP Location:  Right arm Right arm Right arm   Patient Position:  Lying Lying Lying   Pulse: 94 84 86 90   Resp: 18 18 18 18   Temp: 98.7 °F (37.1 °C) 98.1 °F (36.7 °C) 98.2 °F (36.8 °C) 98.1 °F (36.7 °C)   TempSrc: Oral Oral Oral Oral   SpO2: 93% 94% 95% 92%   Weight:       Height:         No flowsheet data found.    Physical Exam  GENERAL:  Well-developed, well-nourished in no acute distress.  Tremulous  SKIN:  Warm, dry without rashes, purpura or petechiae.  EYES:  Pupils equal, round and reactive to light.  EOMs intact.  Conjunctivae normal.  EARS:  Hearing intact.  NOSE:  Septum midline.  No excoriations or nasal discharge.  MOUTH:  Tongue is well-papillated; no stomatitis or ulcers.  Lips normal.  THROAT:  Oropharynx without lesions or exudates.  NECK:  Supple with good range of motion; no thyromegaly or masses, no JVD.  LYMPHATICS:  No cervical, supraclavicular, axillary or inguinal adenopathy.  CHEST:  Lungs clear to auscultation. Good airflow.  CARDIAC:  Regular rate and rhythm without murmurs, rubs or gallops. Normal S1,S2.  ABDOMEN:  Soft, firm distended  nontender with no hepatosplenomegaly or masses.  Ramey catheter in place  EXTREMITIES:  No clubbing, cyanosis or edema.  NEUROLOGICAL:  Cranial Nerves II-XII grossly intact.  No focal neurological deficits.  PSYCHIATRIC:  Normal affect and mood.               RECENT LABS:  Hematology WBC   Date Value Ref Range Status   04/11/2019 9.40 3.40 - 10.80 10*3/mm3 Final     RBC   Date Value Ref Range Status   04/11/2019 2.69 (L) 4.14 - 5.80 10*6/mm3 Final     Hemoglobin   Date Value Ref Range Status   04/11/2019 8.3 (L) 13.0 - 17.7 g/dL Final     Hematocrit   Date Value Ref Range Status   04/11/2019 26.3 (L) 37.5 - 51.0 % Final     Platelets   Date Value Ref Range Status   04/11/2019 237 140 - 450 10*3/mm3 Final     B12 folate normal-SPEP [id number] normal-PSA normal    D-dimer 2.33    Interpretation Summary 4/6/19    · Chronic right lower extremity deep vein thrombosis noted in the gastrocnemius/soleal.  · All other veins appeared normal bilaterally.  · This was a technically difficult study with somewhat limited imaging.        CT CERVICAL SPINE  IMPRESSION:  1. Multilevel degenerative and arthritic changes appear similar to  previous, no acute fracture or abnormal enhancement.  2. Stable tiny lytic lesions which again may be related to myeloma or  metastatic disease                                              CT THORACIC SPINE     IMPRESSION:  1. Multilevel degenerative changes are similar to previous with minimal  scoliosis and no acute osseous abnormality.  2. Tiny nonspecific lytic lesions are stable from previous, there is no  abnormal enhancement.         Assessment/Plan   1. Nonspecific bone lesions on spine CT:   · Unable to obtain MRI due to spinal cord stimulator  · 10/24/18 CT cervical spine with multiple small lytic lesions (3-5 millimeters), CT thoracic spine with similar lesions, largest T2 6 millimeters, none evident on CT lumbar spine.  Lesions indeterminate in nature, benign versus  malignant.  · Skeletal survey 10/25/18 with no visible lytic or blastic lesions  · CT chest abdomen pelvis 10/25/18 with suspicion of small pulmonary emboli, possible right femoral vein DVT, distention of proximal and mid:, Lobulated lesion inferior margin bladder, possibly related to prostate, redemonstration of lucent foci in thoracic spine, indeterminate.    · Labs 10/25/18 with PSA 1.05, negative serum protein electrophoresis and immunoelectrophoresis with normal quantitative immunoglobulins, normal free light chain ratio.  2. Elevated chromogranin a 10/25/18 of unclear significance (value of 8 just above normal range).  24 hour urine 5-HIAA normal   · Bone lesions of unclear significance at this time,-could be nonsecretory myeloma we will proceed with bone marrow evaluation  3. Anemia:   · B12, folic acid levels are normal.  Iron studies consistent with chronic disease.      · Stool positive for occult blood, no clinical evidence of GI blood loss however.  · Hemoglobin steadily dropping down to 8.0?  Bleeding versus hydration and dilution  ·  anticoagulation-low-dose Lovenox  · -SPEP negative  · Hemoglobin dropping steadily over the last 3 days with dark urine?  Hemolysis    3. Left lower lobe pulmonary embolism, right lower extremity femoral/calf DVT 10/25/18:   · Non-angiogram CT chest 10/25/18 with filling defect left lower lobe pulmonary artery and suspicion for femoral DVT on the right.    · Lower extremity Doppler ultrasound 10/26/18 did confirm extensive deep venous thrombosis in the right leg from the femoral vein down into the calf.   · Lower extremity Doppler with notation of right mid calf mass 2 cm of unclear significance or etiology.  Consider repeat Doppler in future.  · Patient still not very mobile spine rehab after he was hospitalized and immobilized with C. difficile colitis,   ·  on Lovenox but patient discontinued 2 months ago when he ran out of medications and repeat Doppler shows no acute  [id number] pending but he is at high risk for DVT again because of his immobility.  Coumadin is a safest oral drug for him but there is no low-dose option therefore I would recommend low-dose Lovenox prophylaxis until he is mobile again  ·   4.Renal insufficiency:  · Patient with fluctuating creatinine ranging from 0.9 up to 1.8 in the past few months  · Today, creatinine improved at 1.11     5.  .Seizure disorder  · On long-term treatment with Dilantin, follows with Dr. Yo as outpatient  · Does not want to change medication  · Interferes with xarelto/eliquis     6. Peripheral neuropathy, chronic low back pain, spinal cord stimulator revision 8/27/18   7.  Constipation  Plan     1.    Bone marrow biopsy -discussed at length with patient and he is agreeable.  It might help explain some of his back pain although I doubt it     2.  .  Patient has had almost 5 months of treatment for his DVT and PE at this point I think it would be reasonable to put him on prophylactic dose of 40 mg Lovenox daily to avoid interactions with his Dilantin and the other oral medications until he is up and about especially with the elevated d-dimer     We will follow , can be discharged after bone marrow to review results as an outpatient                                 4/11/2019      CC:

## 2019-04-11 NOTE — PROGRESS NOTES
"DAILY PROGRESS NOTE  [facility]    Patient Identification:  Name: Chava Stone Sr.  Age: 68 y.o.  Sex: male  :  1950  MRN: [id number]         Primary Care Physician: Gordy Gupta MD      Subjective  No c/o.     Objective:  General Appearance:  Comfortable, well-appearing, in no acute distress and not in pain.    Vital signs: (most recent): Blood pressure 121/89, pulse 88, temperature 98.1 °F (36.7 °C), temperature source Oral, resp. rate 20, height 172.7 cm (68\"), weight 116 kg (255 lb), SpO2 90 %.    Lungs:  Normal effort and normal respiratory rate.  Breath sounds clear to auscultation.    Heart: Normal rate.  Regular rhythm.    Extremities: There is no dependent edema.    Neurological: Patient is alert.  (Oriented to person and place.  Peculiar affect. ).    Skin:  Warm and dry.                Vital signs in last 24 hours:  Temp:  [98 °F (36.7 °C)-98.7 °F (37.1 °C)] 98.1 °F (36.7 °C)  Heart Rate:  [84-94] 94  Resp:  [16-20] 16  BP: (106-140)/(63-80) 114/63    Intake/Output:    Intake/Output Summary (Last 24 hours) at 2019 1646  Last data filed at 2019 1300  Gross per 24 hour   Intake 1580 ml   Output 680 ml   Net 900 ml         Results from last 7 days   Lab Units 19  0232 04/10/19  0344 19  0337 19  0505 19  0505 19  1200   WBC 10*3/mm3 9.40 10.44 13.29* 13.06* 7.77 9.22   HEMOGLOBIN g/dL 8.3* 7.3* 8.0* 9.3* 9.9* 10.3*   PLATELETS 10*3/mm3 237 201 215 203 199 217     Results from last 7 days   Lab Units 19  0232 04/10/19  0344 19  0337 19  0505 19  0505 19  1200   SODIUM mmol/L 142 140 141 139 141 140   POTASSIUM mmol/L 4.8 4.3 3.8 4.0 4.5 4.0   CHLORIDE mmol/L 105 103 102 103 104 100   CO2 mmol/L 24.5 25.1 19.5* 18.3* 20.3* 22.7   BUN mg/dL 31* 22 21 23 29* 28*   CREATININE mg/dL 1.28* 1.08 1.11 1.29* 1.62* 1.55*   GLUCOSE mg/dL 175* 108* 118* 125* 83 122*   Estimated Creatinine Clearance: 67.3 mL/min (A) (by " C-G formula based on SCr of 1.28 mg/dL (H)).  Results from last 7 days   Lab Units 04/11/19  0232 04/10/19  0344 04/09/19  0337 04/08/19  0505 04/07/19  0505 04/06/19  1200   CALCIUM mg/dL 8.9 8.8 8.9 9.2 9.0 9.6   ALBUMIN g/dL  --   --   --   --   --  3.60     Results from last 7 days   Lab Units 04/06/19  1200   ALBUMIN g/dL 3.60   BILIRUBIN mg/dL 0.3   ALK PHOS U/L 60   AST (SGOT) U/L 22   ALT (SGPT) U/L 25       Assessment:  Metabolic encephalopathy-inpatient neurology and recommendation is made to follow-up with Dr. Yo outpatient  -tsh 5.28, b12 335, folate 18  -hold lyrica  -Mental status is back to baseline     Anemia  S/p bone marrow bx.  Path pending.   Trans 1 u 4/10/19     Chronic pain  -Oral pain medication as needed  -He has a pain pump     H/o PE- and had clot in 10/2018  -presently on prof doses of Lovenox.   -No blood clots on lower extremity Doppler  -+ D-dimer  -Patient cannot take Eliquis or Xarelto because of Dilantin     Hxo Seizures disorder -Dilantin  -no seizures in 15 years ago  -Dilantin level is okay, no nystagmus on exam     Hypothyroidism  - continue Synthroid     Acute renal failure  -high PVR, danielle in place, urology is following    Nonspecific bone lesions on spine CT:         Plan:  Please see above  Voiding trial tonight.  If hgb stable in AM OK for d/c.     Marshall Hackett MD  4/11/2019  4:46 PM

## 2019-04-11 NOTE — PLAN OF CARE
Problem: Patient Care Overview  Goal: Plan of Care Review  Outcome: Ongoing (interventions implemented as appropriate)   04/11/19 0152   Plan of Care Review   Progress improving   OTHER   Outcome Summary No significant changes overnight. Pt has remained A/O through the night. Received 1U of PRBC. Bone marrow biopsy today. VSS. Afebrile. NSR.      Goal: Individualization and Mutuality  Outcome: Ongoing (interventions implemented as appropriate)    Goal: Discharge Needs Assessment  Outcome: Ongoing (interventions implemented as appropriate)    Goal: Interprofessional Rounds/Family Conf  Outcome: Ongoing (interventions implemented as appropriate)      Problem: Fall Risk (Adult)  Goal: Absence of Fall  Outcome: Ongoing (interventions implemented as appropriate)      Problem: Skin Injury Risk (Adult)  Goal: Skin Health and Integrity  Outcome: Ongoing (interventions implemented as appropriate)      Problem: Mobility, Physical Impaired (Adult)  Goal: Enhanced Mobility Skills  Outcome: Ongoing (interventions implemented as appropriate)    Goal: Enhanced Functional Ability  Outcome: Ongoing (interventions implemented as appropriate)

## 2019-04-12 NOTE — PLAN OF CARE
Problem: Patient Care Overview  Goal: Plan of Care Review  Outcome: Ongoing (interventions implemented as appropriate)   04/12/19 0206   Coping/Psychosocial   Plan of Care Reviewed With patient   Plan of Care Review   Progress improving   OTHER   Outcome Summary Pt confused and agitated at beginning of shift. Grew increasingly agitated. Began throwing anything he could reach around the room. Gave IM Zyprexa and 2mg of ativan per Dr. Dudley w/ minimal results. Restrained pt w/soft wrists per Dr. Arana. VSS. Afebrile. ST w/ agitation. Coarse tremors during these episodes. Unable to safely take anything PO d/t agitated state. Attempted to call family.      Goal: Individualization and Mutuality  Outcome: Ongoing (interventions implemented as appropriate)    Goal: Discharge Needs Assessment  Outcome: Ongoing (interventions implemented as appropriate)    Goal: Interprofessional Rounds/Family Conf  Outcome: Ongoing (interventions implemented as appropriate)      Problem: Fall Risk (Adult)  Goal: Absence of Fall  Outcome: Ongoing (interventions implemented as appropriate)      Problem: Skin Injury Risk (Adult)  Goal: Skin Health and Integrity  Outcome: Ongoing (interventions implemented as appropriate)      Problem: Mobility, Physical Impaired (Adult)  Goal: Enhanced Mobility Skills  Outcome: Ongoing (interventions implemented as appropriate)    Goal: Enhanced Functional Ability  Outcome: Ongoing (interventions implemented as appropriate)      Problem: Restraint, Nonbehavioral (Nonviolent)  Goal: Rationale and Justification  Outcome: Ongoing (interventions implemented as appropriate)    Goal: Nonbehavioral (Nonviolent) Restraint: Absence of Injury/Harm  Outcome: Ongoing (interventions implemented as appropriate)    Goal: Nonbehavioral (Nonviolent) Restraint: Achievement of Discontinuation Criteria  Outcome: Ongoing (interventions implemented as appropriate)    Goal: Nonbehavioral (Nonviolent) Restraint: Preservation of  Dignity and Wellbeing  Outcome: Ongoing (interventions implemented as appropriate)

## 2019-04-12 NOTE — DISCHARGE PLACEMENT REQUEST
"Petra Stone Sr. (68 y.o. Male)     Date of Birth Social Security Number Address Home Phone MRN    1950  [id number] [place] [id number] [phone] [id number]    Amish Marital Status          Yarsani        Admission Date Admission Type Admitting Provider Attending Provider Department, Room/Bed    4/6/19 Emergency Rashid Banks MD Broaddus, Emmett J., MD [address], [address]    Discharge Date Discharge Disposition Discharge Destination                       Attending Provider:  Marshall Hackett MD    Allergies:  No Known Allergies    Isolation:  None   Infection:  None   Code Status:  CPR    Ht:  172.7 cm (68\")   Wt:  116 kg (255 lb)    Admission Cmt:  None   Principal Problem:  None                Active Insurance as of 4/6/2019     Primary Coverage     Payor Plan Insurance Group Employer/Plan Group    ANTHEM MEDICARE REPLACEMENT ANTHEM MEDICARE ADVANTAGE [id number]     Payor Plan Address Payor Plan Phone Number Payor Plan Fax Number Effective Dates    [address] [phone]  1/1/2016 - None Entered    [facility] [address]       Subscriber Name Subscriber Birth Date Member ID       PETRA STONE LYDIA TA 1950 [id number]                 Emergency Contacts      (Rel.) Home Phone Work Phone Mobile Phone    Hanny Stone (Daughter) -- -- [phone]              "

## 2019-04-12 NOTE — PROGRESS NOTES
Discharge Planning Assessment  [facility]     Patient Name: Chava Stone Sr.  MRN: [id number]  Today's Date: 4/12/2019    Admit Date: 4/6/2019    Discharge Needs Assessment    No documentation.       Discharge Plan     Row Name 04/12/19 1641       Plan    Plan  Skilled rehab with transition to long term care, referrals are pending    Patient/Family in Agreement with Plan  yes    Plan Comments  Spoke with Rachael/ Dannie, [place] has spoken with children for assist with Medicaid paperwork.  Referral is pending.  As other referrals are pending.  Plan for skilled rehab with long term care to follow.  Now with IV pain medications and has been out of restraints since early afternoon..........................Muna Ferguson RN    Row Name 04/12/19 1436       Plan    Plan  Skilled rehab with transition to long-term care, referrals pending    Patient/Family in Agreement with Plan  yes    Plan Comments  Awaiting Signature Luis Alfredo moffett. With daughter's permission, [facility] made additional referrals to [facility], [facility], and [place] and will follow for evals. Lora Lucas LCSW        Destination      Service Provider Request Status Selected Services Address Phone Number Fax Number    [facility] Accept[facility] N[facility]A [address] [facility] [address] [phone]    [facility] Pending - Request Sent N/A [address] [facility] [address] [phone]    [facility] Pending - Request Sent N/A [address] [facility] [address] [phone]    [facility] Pending - [facility] Sent N/A [address] [facility] [address] [phone]    [facility] Declined N/A [address] [facility] [facility] [address] [phone]    [facility] Declined N/A [address] [facility] [address] [phone]     [facility] Declined N/A [address] [facility] [address] [phone]    [facility] Declined N/A [address] [facility] [address] [phone]    [facility] Declined N/A [address] [facility] [address] [phone]      Durable Medical Equipment      No service coordination in this encounter.      Dialysis/Infusion      No service coordination in this encounter.      Home Medical Care      No service coordination in this encounter.      Therapy      No service coordination in this encounter.      Community Resources      No service coordination in this encounter.          Demographic Summary    No documentation.       Functional Status    No documentation.       Psychosocial    No documentation.       Abuse/Neglect    No documentation.       Legal    No documentation.       Substance Abuse    No documentation.       Patient Forms    No documentation.           Muna Ferguson RN

## 2019-04-12 NOTE — PLAN OF CARE
Problem: Restraint, Nonbehavioral (Nonviolent)  Goal: Rationale and Justification  Outcome: Outcome(s) achieved Date Met: 04/12/19

## 2019-04-12 NOTE — THERAPY TREATMENT NOTE
Acute Care - Physical Therapy Treatment Note  [facility]     Patient Name: Chava Stone Sr.  : 1950  MRN: [id number]  Today's Date: 2019  Onset of Illness/Injury or Date of Surgery: 19  Date of Referral to PT: 19  Referring Physician: Mary Lou    Admit Date: 2019    Visit Dx:    ICD-10-CM ICD-9-CM   1. Hallucinations R44.3 780.1   2. General weakness R53.1 780.79   3. Leg pain, bilateral M79.604 729.5    M79.605    4. Impaired mobility Z74.09 799.89   5. Malignant neoplasm of prostate (CMS/HCC) C61 185     Patient Active Problem List   Diagnosis   • Spinal stenosis of lumbar region   • Lumbar radiculopathy   • Spinal cord stimulator status   • Pain in both lower extremities   • Chronic pain   • Postlaminectomy syndrome of lumbar region   • Benign essential hypertension   • Colon polyp   • Diverticulosis of large intestine   • Elevated prostate specific antigen (PSA)   • Psychomotor epilepsy (CMS/HCC)   • Hyperprolactinemia (CMS/HCC)   • Hypogonadism in male   • Acquired hypothyroidism   • Malignant neoplasm of prostate (CMS/HCC)   • Solitary pulmonary nodule   • Vitamin D deficiency   • Battery end of life of spinal cord stimulator   • Weakness   • Peripheral neuropathy   • Diarrhea   • C. difficile colitis   • Pulmonary embolus (CMS/HCC)   • Acute deep vein thrombosis (DVT) of distal end of right lower extremity (CMS/HCC)   • Hallucinations   • Hx pulmonary embolism   • Seizure disorder (CMS/HCC)   • Leukocytosis       Therapy Treatment    Rehabilitation Treatment Summary     Row Name 19 1357             Treatment Time/Intention    Discipline  physical therapist  -      Document Type  therapy note (daily note)  -      Mode of Treatment  individual therapy;physical therapy  -      Patient/Family Observations  pt supine in bed very restless [place] [facility]  -      Existing Precautions/Restrictions  fall  -      Recorded by [] Anastasia Fatima, PT 19  1358      Row Name 04/12/19 1357             Cognitive Assessment/Intervention- PT/OT    Orientation Status (Cognition)  unable/difficult to assess pt didnt verbalize, moans  -LH      Follows Commands (Cognition)  follows one step commands;25-49% accuracy  -      Personal Safety Interventions  fall prevention program maintained;gait belt;nonskid shoes/slippers when out of bed;supervised activity  -      Recorded by [] Anastasia Fatima, PT 04/12/19 1358      Row Name 04/12/19 1357             Bed Mobility Assessment/Treatment    Comment (Bed Mobility)  NT  -      Recorded by [] Anastasia Fatima, PT 04/12/19 1400      Row Name 04/12/19 1357             Lower Extremity Supine Therapeutic Exercise    Performed, Supine Lower Extremity (Therapeutic Exercise)  ankle pumps  -      Exercise Type, Supine Lower Extremity (Therapeutic Exercise)  PROM (passive range of motion);AAROM (active assistive range of motion)  -      Sets/Reps Detail, Supine Lower Extremity (Therapeutic Exercise)  1/10  -LH      Recorded by [] Anastasia Fatima, PT 04/12/19 1400      Row Name 04/12/19 1357             Positioning and Restraints    Pre-Treatment Position  in bed  -      Post Treatment Position  bed  -      In Bed  supine;notified nsg;call light within reach;encouraged to call for assist;exit alarm on  -      Recorded by [] Anastasia Fatima, PT 04/12/19 1400      Row Name 04/12/19 1359             Pain Scale: Word Pre/Post-Treatment    Pain: Word Scale, Pretreatment  4 - moderate pain  -      Pain: Word Scale, Post-Treatment  4 - moderate pain  -      Recorded by [] Anastasia Fatima, PT 04/12/19 1400        User Key  (r) = Recorded By, (t) = Taken By, (c) = Cosigned By    Initials Name Effective Dates Discipline     Anastasia Fatima, PT 04/03/18 -  PT                   Physical Therapy Education     Title: PT OT SLP Therapies (In Progress)     Topic: Physical Therapy (In Progress)     Point: Mobility training (In Progress)      Learning Progress Summary           Patient Nonacceptance, E,TB, NR by  at 4/12/2019  2:00 PM    Acceptance, E,D, NR by  at 4/10/2019  9:16 AM    Acceptance, E, NR by  at 4/8/2019  2:09 PM    Nonacceptance, E, NR by  at 4/6/2019  6:44 PM                   Point: Home exercise program (In Progress)     Learning Progress Summary           Patient Nonacceptance, E,TB, NR by  at 4/12/2019  2:00 PM    Acceptance, E,D, NR by  at 4/10/2019  9:16 AM    Acceptance, E, NR by  at 4/8/2019  2:09 PM                   Point: Body mechanics (In Progress)     Learning Progress Summary           Patient Nonacceptance, E,TB, NR by  at 4/12/2019  2:00 PM    Acceptance, E,D, NR by  at 4/10/2019  9:16 AM    Acceptance, E, NR by  at 4/8/2019  2:09 PM                   Point: Precautions (In Progress)     Learning Progress Summary           Patient Nonacceptance, E,TB, NR by  at 4/12/2019  2:00 PM    Acceptance, E,D, NR by  at 4/10/2019  9:16 AM    Acceptance, E, NR by  at 4/8/2019  2:09 PM                               User Key     Initials Effective Dates Name Provider Type Discipline     04/03/18 -  Anastasia Fatima, PT Physical Therapist PT     03/07/18 -  Yoana Kenney, KENYA Physical Therapy Assistant PT     06/09/17 -  Savanna Talbert RN Registered Nurse Nurse                PT Recommendation and Plan  Anticipated Discharge Disposition (PT): extended care facility  Planned Therapy Interventions (PT Eval): balance training, bed mobility training, gait training, home exercise program, ROM (range of motion), stair training, strengthening, stretching, transfer training  Therapy Frequency (PT Clinical Impression): 3 times/wk  Outcome Summary/Treatment Plan (PT)  Anticipated Discharge Disposition (PT): extended care facility  Plan of Care Reviewed With: patient  Outcome Summary: pt very restless post nursing repositioning pt. performed passive therex. pt non verbal, just moaned during PT session. will  cont to monitor and assess for appropriateness.   Outcome Measures     Row Name 04/12/19 1400 04/10/19 0900          How much help from another person do you currently need...    Turning from your back to your side while in flat bed without using bedrails?  1  -  2  -SM     Moving from lying on back to sitting on the side of a flat bed without bedrails?  1  -  2  -SM     Moving to and from a bed to a chair (including a wheelchair)?  1  -  1  -SM     Standing up from a chair using your arms (e.g., wheelchair, bedside chair)?  1  -  1  -SM     Climbing 3-5 steps with a railing?  1  -  1  -SM     To walk in hospital room?  1  -  1  -SM     AM-PAC 6 Clicks Score  6  -  8  -SM        Functional Assessment    Outcome Measure Options  AM-PAC 6 Clicks Basic Mobility (PT)  -  AM-PAC 6 Clicks Basic Mobility (PT)  -       User Key  (r) = Recorded By, (t) = Taken By, (c) = Cosigned By    Initials Name Provider Type     Anastasia Fatima, PT Physical Therapist    Yoana Gauthier, PTA Physical Therapy Assistant         Time Calculation:   PT Charges     Row Name 04/12/19 1400             Time Calculation    Start Time  1326  -      Stop Time  1336  -      Time Calculation (min)  10 min  -      PT Received On  04/12/19  -      PT - Next Appointment  04/15/19  -        User Key  (r) = Recorded By, (t) = Taken By, (c) = Cosigned By    Initials Name Provider Type     Anastasia Fatima, PT Physical Therapist        Therapy Charges for Today     Code Description Service Date Service Provider Modifiers Qty    [id number]  PT CARE PLAN EACH 15 MIN 4/12/2019 Anastasia Fatiam, PT GP 1          PT G-Codes  Outcome Measure Options: AM-PAC 6 Clicks Basic Mobility (PT)  AM-PAC 6 Clicks Score: 6    Anastasia Fatima PT  4/12/2019

## 2019-04-12 NOTE — SIGNIFICANT NOTE
Patient is not eating well at this point so dietary isn't a top priority, and when his cognition/mentation gets better I will give medications and start encouraging more intake.  He is currently a total feed for guidance and encouragement.      -  Patient was straight catheted over the night due to retention (twice), but when I went into cath him he was able to urinate on his own after cleaning and stimulation.  PVR was checked at around 270.  Will continue to monitor and encourage perineal care for encouragement.

## 2019-04-12 NOTE — PROGRESS NOTES
"DAILY PROGRESS NOTE  [facility]    Patient Identification:  Name: Chava Stone Sr.  Age: 68 y.o.  Sex: male  :  1950  MRN: [id number]         Primary Care Physician: Gordy Gupta MD      Subjective  \"I'm alright\"  Reported to be delirious last night.     Objective:  General Appearance:  In no acute distress and not in pain.    Vital signs: (most recent): Blood pressure 137/82, pulse 116, temperature 98.5 °F (36.9 °C), temperature source Oral, resp. rate 18, height 172.7 cm (68\"), weight 116 kg (255 lb), SpO2 98 %.    Lungs:  Normal effort and normal respiratory rate.  Breath sounds clear to auscultation.    Heart: Tachycardia.  Regular rhythm.    Extremities: There is dependent edema (trace to 1+ pretib bilat. ).    Neurological: Patient is alert.  (Oriented to person and place.  Tremulous.  Peculiar affect. ).    Skin:  Warm and dry.                Vital signs in last 24 hours:  Temp:  [98 °F (36.7 °C)-98.6 °F (37 °C)] 98.5 °F (36.9 °C)  Heart Rate:  [] 129  Resp:  [16-22] 18  BP: (106-155)/(63-95) 137/82    Intake/Output:    Intake/Output Summary (Last 24 hours) at 2019 1044  Last data filed at 2019 0558  Gross per 24 hour   Intake 600 ml   Output 1200 ml   Net -600 ml         Results from last 7 days   Lab Units 19  0615 04/11/19  0232 04/10/19  0344 19  0337 19  0505 19  0505 19  1200   WBC 10*3/mm3 8.83 9.40 10.44 13.29* 13.06* 7.77 9.22   HEMOGLOBIN g/dL 7.9* 8.3* 7.3* 8.0* 9.3* 9.9* 10.3*   PLATELETS 10*3/mm3 264 237 201 215 203 199 217     Results from last 7 days   Lab Units 19  0615 19  0232 04/10/19  0344 19  0337 19  0505 19  0505 19  1200   SODIUM mmol/L 140 142 140 141 139 141 140   POTASSIUM mmol/L 4.2 4.8 4.3 3.8 4.0 4.5 4.0   CHLORIDE mmol/L 103 105 103 102 103 104 100   CO2 mmol/L 22.3 24.5 25.1 19.5* 18.3* 20.3* 22.7   BUN mg/dL 27* 31* 22 21 23 29* 28*   CREATININE mg/dL 1.18 1.28* " 1.08 1.11 1.29* 1.62* 1.55*   GLUCOSE mg/dL 122* 175* 108* 118* 125* 83 122*   Estimated Creatinine Clearance: 73 mL/min (by C-G formula based on SCr of 1.18 mg/dL).  Results from last 7 days   Lab Units 04/12/19  0615 04/11/19  0232 04/10/19  0344 04/09/19  0337 04/08/19  0505 04/07/19  0505 04/06/19  1200   CALCIUM mg/dL 8.6 8.9 8.8 8.9 9.2 9.0 9.6   ALBUMIN g/dL  --   --   --   --   --   --  3.60     Results from last 7 days   Lab Units 04/06/19  1200   ALBUMIN g/dL 3.60   BILIRUBIN mg/dL 0.3   ALK PHOS U/L 60   AST (SGOT) U/L 22   ALT (SGPT) U/L 25       Assessment:  Metabolic encephalopathy-inpatient neurology and recommendation is made to follow-up with Dr. Yo outpatient  -tsh 5.28, b12 335, folate 18  -hold lyrica  -Mental status is back to baseline     Anemia  -S/p bone marrow bx.  Path pending.   -Trans 1 u 4/10/19  -Hgb trending down.  Recheck retic and monitor.      Chronic pain  -Oral pain medication as needed  -He has a pain pump     H/o PE- and had clot in 10/2018  -presently on prof doses of Lovenox.   -No blood clots on lower extremity Doppler  -+ D-dimer  -Patient cannot take Eliquis or Xarelto because of Dilantin     Hxo Seizures disorder -Dilantin  -no seizures in 15 years ago  -Dilantin level is okay, no nystagmus on exam     Hypothyroidism  - continue Synthroid     Acute renal failure  -Resolved.      Nonspecific bone lesions on spine CT:     Urinary retention -probable neurogenic bladder: Urology input appreciated.       Plan:  Please see above.  Randa w YENIFER, RN.     Marshall Hackett MD  4/12/2019  10:44 AM

## 2019-04-12 NOTE — PROGRESS NOTES
Subjective   REASON FOR CONSULTATION: Opinion regarding anticoagulation    Leg Pain      Altered Mental Status       patient is a 68-year-old male with chronic pain and peripheral neuropathy with a spinal stimulator history of seizure disorder and DVT PE during his last admission that we saw during his last admission to the hospital with some abnormal bone imaging of the spine with possible lytic lesions versus hemangioma and also DVT on 10/26/2018 with probable pulmonary embolism on a non-CT angiogram chest CT.  Patient was discharged home with Lovenox to the rehab with plans to follow-up in our office in a month which he never did-he was scheduled to have follow-up bone scans and CAT scans.  We did receive a phone call from the nursing home on 11/26/2018 asking whether he could stop his Lovenox and at that time we recommended continuing this     He is admitted at this time to the hospital with hallucinations and is no longer on Lovenox . there is a note from Dr. Catracho Yo on 12/4/2018 recommended switching to Coumadin but the patient tells me he did not want to deal with this and continue Lovenox until about February when the prescription ran out and he did not refill it because he did not realize that he had to      we are asked to consult on what to do about his anticoagulation.  His repeat Doppler shows chronic right lower extremity DVT in the gastrocnemius everything else was benign and his upper extremities are negative     CT abdomen shows extensive fecal burden in the colon with a transition point at the splenic flexure and colonoscopy is recommended     INTERVAL HISTORY:  4/9  Intermittent confusion  Creatinine 1.1  Will repeat CT T spine to evaluate lesions in T spine    4/10  Awake alert afebrile intermittent tachycardia-intermittent confusion-no bowel movement since admission  Hemoglobin is dropping steadily with dark urine?  hemolysis  CAT scan shows stable small lytic lesions in the C-spine and  T-spine etiology unclear  Discussed bone marrow biopsy the patient is agreeable we will proceed and also check for hemolysis    4/11  Feels the same today hemoglobin higher at 8.3-haptoglobin normal reticulocyte is 3.6% LDH mildly elevated doubt hemolysis  Scheduled for bone marrow biopsy today  Will follow can be discharged to nursing home before results are out-    4/12  Tachycardic but afebrile  Hard to arouse confused agitated twitching  Nonverbal  Hemoglobin stable haptoglobin normal LDH mildly elevated reticulocyte 3.6%  Bone marrow pending    Past Medical History, Past Surgical History, Social History, Family History have been reviewed and are without significant changes except as mentioned.    Review of Systems   A comprehensive 14 point review of systems was performed and was negative except as mentioned.    Medications:  The current medication list was reviewed in the EMR    ALLERGIES:  No Known Allergies    Objective      Vitals:    04/12/19 0803 04/12/19 1044 04/12/19 1340 04/12/19 1404   BP: 137/82  (!) 149/109    BP Location: Right arm  Right arm    Patient Position: Lying  Lying    Pulse: (!) 129 116 93 104   Resp: 18 18 18    Temp: 98.5 °F (36.9 °C)  97.7 °F (36.5 °C)    TempSrc: Oral  Oral    SpO2: 92% 98% 90% 100%   Weight:       Height:         No flowsheet data found.    Physical Exam  GENERAL:  Well-developed, well-nourished in no acute distress.  Tremulous  SKIN:  Warm, dry without rashes, purpura or petechiae.  EYES:  Pupils equal, round and reactive to light.  EOMs intact.  Conjunctivae normal.  EARS:  Hearing intact.  NOSE:  Septum midline.  No excoriations or nasal discharge.  MOUTH:  Tongue is well-papillated; no stomatitis or ulcers.  Lips normal.  THROAT:  Oropharynx without lesions or exudates.  NECK:  Supple with good range of motion; no thyromegaly or masses, no JVD.  LYMPHATICS:  No cervical, supraclavicular, axillary or inguinal adenopathy.  CHEST:  Lungs clear to auscultation. Good  airflow.  CARDIAC:  Regular rate and rhythm without murmurs, rubs or gallops. Normal S1,S2.  ABDOMEN:  Soft, firm distended nontender with no hepatosplenomegaly or masses.  Ramey catheter in place  EXTREMITIES:  No clubbing, cyanosis or edema.  NEUROLOGICAL:  Cranial Nerves II-XII grossly intact.  No focal neurological deficits.  PSYCHIATRIC:  Normal affect and mood.               RECENT LABS:  Hematology WBC   Date Value Ref Range Status   04/12/2019 8.83 3.40 - 10.80 10*3/mm3 Final     RBC   Date Value Ref Range Status   04/12/2019 2.58 (L) 4.14 - 5.80 10*6/mm3 Final     Hemoglobin   Date Value Ref Range Status   04/12/2019 7.9 (L) 13.0 - 17.7 g/dL Final     Hematocrit   Date Value Ref Range Status   04/12/2019 25.5 (L) 37.5 - 51.0 % Final     Platelets   Date Value Ref Range Status   04/12/2019 264 140 - 450 10*3/mm3 Final     B12 folate normal-SPEP [id number] normal-PSA normal    D-dimer 2.33    Interpretation Summary 4/6/19    · Chronic right lower extremity deep vein thrombosis noted in the gastrocnemius/soleal.  · All other veins appeared normal bilaterally.  · This was a technically difficult study with somewhat limited imaging.        CT CERVICAL SPINE  IMPRESSION:  1. Multilevel degenerative and arthritic changes appear similar to  previous, no acute fracture or abnormal enhancement.  2. Stable tiny lytic lesions which again may be related to myeloma or  metastatic disease                                              CT THORACIC SPINE     IMPRESSION:  1. Multilevel degenerative changes are similar to previous with minimal  scoliosis and no acute osseous abnormality.  2. Tiny nonspecific lytic lesions are stable from previous, there is no  abnormal enhancement.         Assessment/Plan   1. Nonspecific bone lesions on spine CT:   · Unable to obtain MRI due to spinal cord stimulator  · 10/24/18 CT cervical spine with multiple small lytic lesions (3-5 millimeters), CT thoracic spine with similar lesions,  largest T2 6 millimeters, none evident on CT lumbar spine.  Lesions indeterminate in nature, benign versus malignant.  · Skeletal survey 10/25/18 with no visible lytic or blastic lesions  · CT chest abdomen pelvis 10/25/18 with suspicion of small pulmonary emboli, possible right femoral vein DVT, distention of proximal and mid:, Lobulated lesion inferior margin bladder, possibly related to prostate, redemonstration of lucent foci in thoracic spine, indeterminate.    · Labs 10/25/18 with PSA 1.05, negative serum protein electrophoresis and immunoelectrophoresis with normal quantitative immunoglobulins, normal free light chain ratio.  2. Elevated chromogranin a 10/25/18 of unclear significance (value of 8 just above normal range).  24 hour urine 5-HIAA normal   · Bone lesions of unclear significance at this time,-could be nonsecretory myeloma we will proceed with bone marrow evaluation  3. Anemia:   · B12, folic acid levels are normal.  Iron studies consistent with chronic disease.      · Stool positive for occult blood, no clinical evidence of GI blood loss however.  · Hemoglobin steadily dropping down to 8.0?  Bleeding versus hydration and dilution  ·  anticoagulation-low-dose Lovenox  · -SPEP negative  · Hemoglobin dropping steadily over the last 3 days with dark urine?  Hemolysis haptoglobin normal    3. Left lower lobe pulmonary embolism, right lower extremity femoral/calf DVT 10/25/18:   · Non-angiogram CT chest 10/25/18 with filling defect left lower lobe pulmonary artery and suspicion for femoral DVT on the right.    · Lower extremity Doppler ultrasound 10/26/18 did confirm extensive deep venous thrombosis in the right leg from the femoral vein down into the calf.   · Lower extremity Doppler with notation of right mid calf mass 2 cm of unclear significance or etiology.  Consider repeat Doppler in future.  · Patient still not very mobile spine rehab after he was hospitalized and immobilized with C. difficile  colitis,   ·  on Lovenox but patient discontinued 2 months ago when he ran out of medications and repeat Doppler shows no acute [id number] pending but he is at high risk for DVT again because of his immobility.  Coumadin is a safest oral drug for him but there is no low-dose option therefore I would recommend low-dose Lovenox prophylaxis until he is mobile again  ·   4.Renal insufficiency:  · Patient with fluctuating creatinine ranging from 0.9 up to 1.8 in the past few months  · Today, creatinine improved at 1.18     5.  .Seizure disorder  · On long-term treatment with Dilantin, follows with Dr. Yo as outpatient  · Does not want to change medication  · Interferes with xarelto/eliquis can cause Pseudolymphoma  · Intermittent confusion and possible subclinical seizure     6. Peripheral neuropathy, chronic low back pain, spinal cord stimulator revision 8/27/18     7.  Constipation  Plan     1.   Await Bone marrow biopsy results- It might help explain some of his back pain although I doubt it     2.  .  Patient has had almost 5 months of treatment for his DVT and PE at this point I think it would be reasonable to put him on prophylactic dose of 40 mg Lovenox daily to avoid interactions with his Dilantin and the other oral medications until he is up and about especially with the elevated d-dimer    3.  Confusion which is very intermittent etiology unclear?  Seizures subclinical      Neurology evaluation?  Does better with Zyprexa                                 4/12/2019      CC:

## 2019-04-12 NOTE — PROGRESS NOTES
DAILY PROGRESS NOTE    Pt failed voiding trial, requiring CIC. Orders given for cic q8hrs and prn. Pt will need OP UDS study to evaluate. I suspect he has a NGB. If having troubel with cic can place 16 coude catheter.

## 2019-04-12 NOTE — PROGRESS NOTES
Continued Stay Note  [facility]     Patient Name: Chava Stone Sr.  MRN: [id number]  Today's Date: 4/12/2019    Admit Date: 4/6/2019    Discharge Plan     Row Name 04/12/19 1435       Plan    Plan  Skilled rehab with transition to long-term care, referrals pending    Patient/Family in Agreement with Plan  yes    Plan Comments  Awaiting [facility] eval. With daughter's permission, [facility] made additional referrals to [facility], [facility], [facility], and [place] and will follow for evals. Lora Lucas LCSW                                   Discharge Codes    No documentation.             Izzy Lucas LCSW

## 2019-04-12 NOTE — PROGRESS NOTES
Continued Stay Note  [facility]     Patient Name: Chava Stone Sr.  MRN: [id number]  Today's Date: 4/12/2019    Admit Date: 4/6/2019    Discharge Plan     Row Name 04/12/19 1156       Plan    Plan  Skilled rehab with long term care to follow, referrals are pending,  needs PT notes to start precert skilled bed    Patient/Family in Agreement with Plan  yes    Plan Comments  Rachael/ Dannie states as of today that [facility] does not have a Medicaid pending bed.  She spoke with patient and they are looking at [facility] at this time.  Referral is pending.  Awaiting PT notes to start precert for skilled rehab. Patient will need to sell his home and a vehicle.  His son lives in his home and he will need to find a place to live also.  Will ask  to assist with DC planning...........................Muna Ferguson RN         Discharge Codes    No documentation.             Muna Ferguson RN

## 2019-04-12 NOTE — PROGRESS NOTES
Neurology follow-up    CC: Altered mental status    S: Spoke with nursing this morning shortly before 8 AM.  Patient had been confused and hallucinating overnight.  We reviewed morning labs.  No fever vitals stable    O: Vitals: Current blood pressure 149/1 09-91-18; 36.5 degrees C.  No temperature elevations past 24 hours.  Some tachycardia but resolved.  Oxygen saturation 92% on room air.     Exam: The patient is asleep.  He arouses to stimulation.  He moves upper extremities equally.  Moves feet equally.  His neck is supple.    Labs: Heme globin 7.9 hematocrit 25.5 white count 8800.  There were immature granulocytes elevated.  BUN was 27 creatinine 1.18.  Blood sugar 122.  A recheck H&H was 8.1 and 26.8 respectively.    Review of further notes indicated that when Dr. Hackett saw him in late morning his mental status and returned to baseline.    He had been treated with a single dose of Zyprexa IM earlier this morning and a single dose of Ativan late last night.    A/P:  1.  Altered mental status with hallucinations consistent with toxic metabolic encephalopathy/psychosis.  Seem to have resolved by mid morning.  May have been a result of the Zyprexa.  No additional sources of encephalopathy have been uncovered.  2.  Bone marrow biopsy results are pending

## 2019-04-12 NOTE — PLAN OF CARE
Problem: Patient Care Overview  Goal: Plan of Care Review   04/12/19 2433   Coping/Psychosocial   Plan of Care Reviewed With patient   OTHER   Outcome Summary pt very restless post nursing repositioning pt. performed passive therex. dimmed lights in room and comforted pt. pt non verbal, just moaned during PT session. will cont to monitor and assess for appropriateness.

## 2019-04-12 NOTE — PLAN OF CARE
Problem: Restraint, Nonbehavioral (Nonviolent)  Goal: Rationale and Justification   04/12/19 1455   Restraint, Nonbehavioral (Nonviolent)   Rationale and Justification prevent line/tube removal;prevent harm to self     Goal: Nonbehavioral (Nonviolent) Restraint: Absence of Injury/Harm  Patient taken out of restraints at 1044, and at 1244 he has tolerated being out.  Goal: Nonbehavioral (Nonviolent) Restraint: Achievement of Discontinuation Criteria  Patient taken out of restraints at 1044, and at 1244 he has tolerated being out.  Goal: Nonbehavioral (Nonviolent) Restraint: Preservation of Dignity and Wellbeing  Patient taken out of restraints at 1044, and at 1244 he has tolerated being out.

## 2019-04-12 NOTE — PLAN OF CARE
Problem: Patient Care Overview  Goal: Plan of Care Review  Outcome: Ongoing (interventions implemented as appropriate)   04/12/19 0833   Coping/Psychosocial   Plan of Care Reviewed With patient   Plan of Care Review   Progress no change   OTHER   Outcome Summary Patient was not oriented today, in and out of delirium at most times. PT was able to get him to the edge of the bed, but not much more. Patient was able to void on his own today, but still has PRN cath orders if needed.        Problem: Fall Risk (Adult)  Goal: Absence of Fall  Outcome: Ongoing (interventions implemented as appropriate)      Problem: Skin Injury Risk (Adult)  Goal: Skin Health and Integrity  Outcome: Ongoing (interventions implemented as appropriate)

## 2019-04-13 NOTE — PROGRESS NOTES
"DAILY PROGRESS NOTE  [facility]    Patient Identification:  Name: Chava Stone Sr.  Age: 68 y.o.  Sex: male  :  1950  MRN: [id number]         Primary Care Physician: Gordy Gupta MD      Subjective  \"I am doing grand how are you\".  No specific complaints at present.  Speaking to the nursing staff he does periodically complain of lower extremity pain.    Objective:  General Appearance:  Comfortable, well-appearing, in no acute distress and not in pain.    Vital signs: (most recent): Blood pressure 107/72, pulse 84, temperature 98.1 °F (36.7 °C), temperature source Oral, resp. rate 20, height 172.7 cm (68\"), weight 116 kg (255 lb), SpO2 92 %.    Lungs:  Normal effort and normal respiratory rate.    Heart: Normal rate.  Regular rhythm.    Extremities: There is no dependent edema.    Neurological: (Patient sleeping on entering the room.  Awakens fairly easily.  Oriented to person and place.  Still with a rather peculiar affect.).    Skin:  Warm and dry.                Vital signs in last 24 hours:  Temp:  [98 °F (36.7 °C)-98.1 °F (36.7 °C)] 98.1 °F (36.7 °C)  Heart Rate:  [] 84  Resp:  [20] 20  BP: (107-142)/(72-83) 107/72    Intake/Output:    Intake/Output Summary (Last 24 hours) at 2019 1353  Last data filed at 2019 0907  Gross per 24 hour   Intake 640 ml   Output 400 ml   Net 240 ml         Results from last 7 days   Lab Units 19  0431 19  1516 19  0615 19  0232 04/10/19  0344 19  0337 19  0505 19  0505   WBC 10*3/mm3 7.30  --  8.83 9.40 10.44 13.29* 13.06* 7.77   HEMOGLOBIN g/dL 8.3* 8.1* 7.9* 8.3* 7.3* 8.0* 9.3* 9.9*   PLATELETS 10*3/mm3 259  --  264 237 201 215 203 199     Results from last 7 days   Lab Units 19  0431 19  0615 19  0232 04/10/19  0344 19  0337 19  0505 19  0505   SODIUM mmol/L 142 140 142 140 141 139 141   POTASSIUM mmol/L 4.2 4.2 4.8 4.3 3.8 4.0 4.5   CHLORIDE mmol/L 104 " 103 105 103 102 103 104   CO2 mmol/L 25.4 22.3 24.5 25.1 19.5* 18.3* 20.3*   BUN mg/dL 26* 27* 31* 22 21 23 29*   CREATININE mg/dL 1.02 1.18 1.28* 1.08 1.11 1.29* 1.62*   GLUCOSE mg/dL 116* 122* 175* 108* 118* 125* 83   Estimated Creatinine Clearance: 84.5 mL/min (by C-G formula based on SCr of 1.02 mg/dL).  Results from last 7 days   Lab Units 04/13/19  0431 04/12/19  0615 04/11/19  0232 04/10/19  0344 04/09/19  0337 04/08/19  0505 04/07/19  0505   CALCIUM mg/dL 8.5* 8.6 8.9 8.8 8.9 9.2 9.0         Assessment:  Mental status changes - off and on.  Metabolic encephalopathy, consider psychosis.      Anemia  -S/p bone marrow bx.  Path pending.   -Trans 1 u 4/10/19     Chronic pain  -Oral pain medication as needed  -He has a pain pump     H/o PE- and had clot in 10/2018  -presently on prof doses of Lovenox.   -No blood clots on lower extremity Doppler  -+ D-dimer  -Patient cannot take Eliquis or Xarelto because of Dilantin     Hxo Seizures disorder -Dilantin  -no seizures in 15 years ago  -Dilantin level is okay, no nystagmus on exam     Hypothyroidism  - continue Synthroid     Acute renal failure  -Resolved.     Urinary retention -probable neurogenic bladder: Urology input appreciated.  Ramey catheter presently out.  Request a post void residual.    UTI status post Ramey catheter: Patient not a reliable historian concerning symptoms.  I will go ahead and treat with oral antibiotics.     Nonspecific bone lesions on spine CT:         Plan:  Please see above.  I suspect there may be a psychiatric component to his mental status issues.  We will request a psychiatric consultation.    Discussed with nursing staff.    Over 30 minutes spent with over one half in counseling medical management.    Marshall Hackett MD  4/13/2019  1:53 PM

## 2019-04-13 NOTE — PROGRESS NOTES
Subjective   REASON FOR CONSULTATION: Opinion regarding anticoagulation    Leg Pain      Altered Mental Status       patient is a 68-year-old male with chronic pain and peripheral neuropathy with a spinal stimulator history of seizure disorder and DVT PE during his last admission that we saw during his last admission to the hospital with some abnormal bone imaging of the spine with possible lytic lesions versus hemangioma and also DVT on 10/26/2018 with probable pulmonary embolism on a non-CT angiogram chest CT.  Patient was discharged home with Lovenox to the rehab with plans to follow-up in our office in a month which he never did-he was scheduled to have follow-up bone scans and CAT scans.  We did receive a phone call from the nursing home on 11/26/2018 asking whether he could stop his Lovenox and at that time we recommended continuing this     He is admitted at this time to the hospital with hallucinations and is no longer on Lovenox . there is a note from Dr. Catracho Yo on 12/4/2018 recommended switching to Coumadin but the patient tells me he did not want to deal with this and continue Lovenox until about February when the prescription ran out and he did not refill it because he did not realize that he had to      we are asked to consult on what to do about his anticoagulation.  His repeat Doppler shows chronic right lower extremity DVT in the gastrocnemius everything else was benign and his upper extremities are negative     CT abdomen shows extensive fecal burden in the colon with a transition point at the splenic flexure and colonoscopy is recommended     INTERVAL HISTORY:  4/9  Intermittent confusion  Creatinine 1.1  Will repeat CT T spine to evaluate lesions in T spine    4/10  Awake alert afebrile intermittent tachycardia-intermittent confusion-no bowel movement since admission  Hemoglobin is dropping steadily with dark urine?  hemolysis  CAT scan shows stable small lytic lesions in the C-spine and  T-spine etiology unclear  Discussed bone marrow biopsy the patient is agreeable we will proceed and also check for hemolysis    4/11  Feels the same today hemoglobin higher at 8.3-haptoglobin normal reticulocyte is 3.6% LDH mildly elevated doubt hemolysis  Scheduled for bone marrow biopsy today  Will follow can be discharged to nursing home before results are out-    4/12  Tachycardic but afebrile  Hard to arouse confused agitated twitching  Nonverbal  Hemoglobin stable haptoglobin normal LDH mildly elevated reticulocyte 3.6%  Bone marrow pending    4/13  More like himself today  Cannot understand intermittent delirium   Hg 8.3  Bone marrow results pending      Past Medical History, Past Surgical History, Social History, Family History have been reviewed and are without significant changes except as mentioned.    Review of Systems   A comprehensive 14 point review of systems was performed and was negative except as mentioned.    Medications:  The current medication list was reviewed in the EMR    ALLERGIES:  No Known Allergies    Objective      Vitals:    04/12/19 1404 04/12/19 1643 04/12/19 2300 04/13/19 0840   BP:   142/83 107/72   BP Location:   Right arm Right arm   Patient Position:   Lying Lying   Pulse: 104 91 81 84   Resp:   20 20   Temp:   98 °F (36.7 °C) 98.1 °F (36.7 °C)   TempSrc:   Oral Oral   SpO2: 100% 92%     Weight:       Height:         No flowsheet data found.    Physical Exam  GENERAL:  Well-developed, well-nourished in no acute distress.  Tremulous  SKIN:  Warm, dry without rashes, purpura or petechiae.  EYES:  Pupils equal, round and reactive to light.  EOMs intact.  Conjunctivae normal.  EARS:  Hearing intact.  NOSE:  Septum midline.  No excoriations or nasal discharge.  MOUTH:  Tongue is well-papillated; no stomatitis or ulcers.  Lips normal.  THROAT:  Oropharynx without lesions or exudates.  NECK:  Supple with good range of motion; no thyromegaly or masses, no JVD.  LYMPHATICS:  No cervical,  supraclavicular, axillary or inguinal adenopathy.  CHEST:  Lungs clear to auscultation. Good airflow.  CARDIAC:  Regular rate and rhythm without murmurs, rubs or gallops. Normal S1,S2.  ABDOMEN:  Soft, firm distended nontender with no hepatosplenomegaly or masses.  Ramey catheter in place  EXTREMITIES:  No clubbing, cyanosis or edema.  NEUROLOGICAL:  Cranial Nerves II-XII grossly intact.  No focal neurological deficits.  PSYCHIATRIC:  Normal affect and mood.               RECENT LABS:  Hematology WBC   Date Value Ref Range Status   04/13/2019 7.30 3.40 - 10.80 10*3/mm3 Final     RBC   Date Value Ref Range Status   04/13/2019 2.74 (L) 4.14 - 5.80 10*6/mm3 Final     Hemoglobin   Date Value Ref Range Status   04/13/2019 8.3 (L) 13.0 - 17.7 g/dL Final     Hematocrit   Date Value Ref Range Status   04/13/2019 28.0 (L) 37.5 - 51.0 % Final     Platelets   Date Value Ref Range Status   04/13/2019 259 140 - 450 10*3/mm3 Final     B12 folate normal-SPEP [id number] normal-PSA normal    D-dimer 2.33    Interpretation Summary 4/6/19    · Chronic right lower extremity deep vein thrombosis noted in the gastrocnemius/soleal.  · All other veins appeared normal bilaterally.  · This was a technically difficult study with somewhat limited imaging.        CT CERVICAL SPINE  IMPRESSION:  1. Multilevel degenerative and arthritic changes appear similar to  previous, no acute fracture or abnormal enhancement.  2. Stable tiny lytic lesions which again may be related to myeloma or  metastatic disease                                              CT THORACIC SPINE     IMPRESSION:  1. Multilevel degenerative changes are similar to previous with minimal  scoliosis and no acute osseous abnormality.  2. Tiny nonspecific lytic lesions are stable from previous, there is no  abnormal enhancement.         Assessment/Plan   1. Nonspecific bone lesions on spine CT:   · Unable to obtain MRI due to spinal cord stimulator  · 10/24/18 CT cervical spine  with multiple small lytic lesions (3-5 millimeters), CT thoracic spine with similar lesions, largest T2 6 millimeters, none evident on CT lumbar spine.  Lesions indeterminate in nature, benign versus malignant.  · Skeletal survey 10/25/18 with no visible lytic or blastic lesions  · CT chest abdomen pelvis 10/25/18 with suspicion of small pulmonary emboli, possible right femoral vein DVT, distention of proximal and mid:, Lobulated lesion inferior margin bladder, possibly related to prostate, redemonstration of lucent foci in thoracic spine, indeterminate.    · Labs 10/25/18 with PSA 1.05, negative serum protein electrophoresis and immunoelectrophoresis with normal quantitative immunoglobulins, normal free light chain ratio.  2. Elevated chromogranin a 10/25/18 of unclear significance (value of 8 just above normal range).  24 hour urine 5-HIAA normal   · Bone lesions of unclear significance at this time,-could be nonsecretory myeloma we will proceed with bone marrow evaluation  3. Anemia:   · B12, folic acid levels are normal.  Iron studies consistent with chronic disease.      · Stool positive for occult blood, no clinical evidence of GI blood loss however.  · Hemoglobin steadily dropping down to 8.0?  Bleeding versus hydration and dilution  ·  anticoagulation-low-dose Lovenox  · -SPEP negative  · Hemoglobin dropping steadily over the last 3 days with dark urine?  Hemolysis haptoglobin normal    3. Left lower lobe pulmonary embolism, right lower extremity femoral/calf DVT 10/25/18:   · Non-angiogram CT chest 10/25/18 with filling defect left lower lobe pulmonary artery and suspicion for femoral DVT on the right.    · Lower extremity Doppler ultrasound 10/26/18 did confirm extensive deep venous thrombosis in the right leg from the femoral vein down into the calf.   · Lower extremity Doppler with notation of right mid calf mass 2 cm of unclear significance or etiology.  Consider repeat Doppler in future.  · Patient  still not very mobile spine rehab after he was hospitalized and immobilized with C. difficile colitis,   ·  on Lovenox but patient discontinued 2 months ago when he ran out of medications and repeat Doppler shows no acute [id number] pending but he is at high risk for DVT again because of his immobility.  Coumadin is a safest oral drug for him but there is no low-dose option therefore I would recommend low-dose Lovenox prophylaxis until he is mobile again  ·   4.Renal insufficiency:  · Patient with fluctuating creatinine ranging from 0.9 up to 1.8 in the past few months  · Today, creatinine improved at 1.18     5.  .Seizure disorder  · On long-term treatment with Dilantin, follows with Dr. Yo as outpatient  · Does not want to change medication  · Interferes with xarelto/eliquis can cause Pseudolymphoma  · Intermittent confusion and possible subclinical seizure     6. Peripheral neuropathy, chronic low back pain, spinal cord stimulator revision 8/27/18     7.  Constipation  Plan     1.   Await Bone marrow biopsy results- It might help explain some of his back pain although I doubt it     2.  .  Patient has had almost 5 months of treatment for his DVT and PE at this point I think it would be reasonable to put him on prophylactic dose of 40 mg Lovenox daily to avoid interactions with his Dilantin and the other oral medications until he is up and about especially with the elevated d-dimer    3.  Confusion which is very intermittent etiology unclear?  Seizures subclinical      Neurology evaluation?  Does better with Zyprexa                                 4/13/2019      CC:

## 2019-04-13 NOTE — PLAN OF CARE
"Problem: Patient Care Overview  Goal: Plan of Care Review  Outcome: Ongoing (interventions implemented as appropriate)   04/13/19 0416 04/13/19 1128   Coping/Psychosocial   Plan of Care Reviewed With patient --    Plan of Care Review   Progress no change --    OTHER   Outcome Summary --  Pt alert and oriented times 3, disoriented to time. Pt was not in restraints this am when this nurse came on shift, however documentation not discontinued. Pt VSS. Pt complains of pain controlled by medication. Pt unable to be fullly comfortable in bed, pt states \"i can't get the pressure off my legs. Pt position adjusted without relief. Pt offerd prn pain medication. Will continue to monitor pt for changes in condition.      04/13/19 0416 04/13/19 1128   Coping/Psychosocial   Plan of Care Reviewed With patient --    Plan of Care Review   Progress no change --    OTHER   Outcome Summary --       Goal: Individualization and Mutuality  Outcome: Ongoing (interventions implemented as appropriate)    Goal: Discharge Needs Assessment  Outcome: Ongoing (interventions implemented as appropriate)    Goal: Interprofessional Rounds/Family Conf  Outcome: Ongoing (interventions implemented as appropriate)      Problem: Fall Risk (Adult)  Goal: Absence of Fall  Outcome: Ongoing (interventions implemented as appropriate)      Problem: Skin Injury Risk (Adult)  Goal: Skin Health and Integrity  Outcome: Ongoing (interventions implemented as appropriate)        "

## 2019-04-13 NOTE — PLAN OF CARE
Problem: Patient Care Overview  Goal: Plan of Care Review  Outcome: Ongoing (interventions implemented as appropriate)    Goal: Individualization and Mutuality  Outcome: Ongoing (interventions implemented as appropriate)    Goal: Interprofessional Rounds/Family Conf  Outcome: Ongoing (interventions implemented as appropriate)      Problem: Fall Risk (Adult)  Goal: Absence of Fall  Outcome: Ongoing (interventions implemented as appropriate)      Problem: Skin Injury Risk (Adult)  Goal: Skin Health and Integrity  Outcome: Ongoing (interventions implemented as appropriate)      Problem: Mobility, Physical Impaired (Adult)  Goal: Enhanced Mobility Skills  Outcome: Ongoing (interventions implemented as appropriate)    Goal: Enhanced Functional Ability  Outcome: Ongoing (interventions implemented as appropriate)

## 2019-04-14 NOTE — CONSULTS
Events since my last consultation note of 4/8/2019 are noted.  When seen today, the patient is fully oriented, a bit hyper inclusive with regards to speech and slightly elevated with mood but reporting no auditory or visual hallucinations and responding to no internal stimuli.  Recurrence of delirium is most likely related to his current infectious status, and his mentation should return to baseline with resolution of his urinary tract infection.  In the meantime, I would continue PRN Zyprexa for agitation due to psychosis.

## 2019-04-14 NOTE — PLAN OF CARE
Problem: Patient Care Overview  Goal: Individualization and Mutuality  Outcome: Ongoing (interventions implemented as appropriate)    Goal: Discharge Needs Assessment  Outcome: Ongoing (interventions implemented as appropriate)    Goal: Interprofessional Rounds/Family Conf  Outcome: Ongoing (interventions implemented as appropriate)      Problem: Fall Risk (Adult)  Goal: Absence of Fall  Outcome: Ongoing (interventions implemented as appropriate)      Problem: Skin Injury Risk (Adult)  Goal: Skin Health and Integrity  Outcome: Ongoing (interventions implemented as appropriate)      Problem: Mobility, Physical Impaired (Adult)  Goal: Enhanced Mobility Skills  Outcome: Ongoing (interventions implemented as appropriate)    Goal: Enhanced Functional Ability  Outcome: Ongoing (interventions implemented as appropriate)

## 2019-04-14 NOTE — PROGRESS NOTES
Subjective   REASON FOR CONSULTATION: Opinion regarding anticoagulation    Leg Pain      Altered Mental Status       patient is a 68-year-old male with chronic pain and peripheral neuropathy with a spinal stimulator history of seizure disorder and DVT PE during his last admission that we saw during his last admission to the hospital with some abnormal bone imaging of the spine with possible lytic lesions versus hemangioma and also DVT on 10/26/2018 with probable pulmonary embolism on a non-CT angiogram chest CT.  Patient was discharged home with Lovenox to the rehab with plans to follow-up in our office in a month which he never did-he was scheduled to have follow-up bone scans and CAT scans.  We did receive a phone call from the nursing home on 11/26/2018 asking whether he could stop his Lovenox and at that time we recommended continuing this     He is admitted at this time to the hospital with hallucinations and is no longer on Lovenox . there is a note from Dr. Catracho Yo on 12/4/2018 recommended switching to Coumadin but the patient tells me he did not want to deal with this and continue Lovenox until about February when the prescription ran out and he did not refill it because he did not realize that he had to      we are asked to consult on what to do about his anticoagulation.  His repeat Doppler shows chronic right lower extremity DVT in the gastrocnemius everything else was benign and his upper extremities are negative     CT abdomen shows extensive fecal burden in the colon with a transition point at the splenic flexure and colonoscopy is recommended     INTERVAL HISTORY:  4/9  Intermittent confusion  Creatinine 1.1  Will repeat CT T spine to evaluate lesions in T spine    4/10  Awake alert afebrile intermittent tachycardia-intermittent confusion-no bowel movement since admission  Hemoglobin is dropping steadily with dark urine?  hemolysis  CAT scan shows stable small lytic lesions in the C-spine and  T-spine etiology unclear  Discussed bone marrow biopsy the patient is agreeable we will proceed and also check for hemolysis    4/11  Feels the same today hemoglobin higher at 8.3-haptoglobin normal reticulocyte is 3.6% LDH mildly elevated doubt hemolysis  Scheduled for bone marrow biopsy today  Will follow can be discharged to nursing home before results are out-    4/12  Tachycardic but afebrile  Hard to arouse confused agitated twitching  Nonverbal  Hemoglobin stable haptoglobin normal LDH mildly elevated reticulocyte 3.6%  Bone marrow pending    4/13  More like himself today  Cannot understand intermittent delirium   Hg 8.3  Bone marrow results pending    4/14  Awake alert joking  Hemoglobin is dropping again  GI had recommended follow-up colonoscopy his last admission and I think we need to consult GI  Bone marrow biopsy pending    Past Medical History, Past Surgical History, Social History, Family History have been reviewed and are without significant changes except as mentioned.    Review of Systems   A comprehensive 14 point review of systems was performed and was negative except as mentioned.    Medications:  The current medication list was reviewed in the EMR    ALLERGIES:  No Known Allergies    Objective      Vitals:    04/13/19 2350 04/14/19 0725 04/14/19 0800 04/14/19 0900   BP: 110/67  110/60    BP Location: Right arm  Right arm    Patient Position: Lying  Lying    Pulse: 90 91 90 83   Resp: 20 20 20    Temp: 98.4 °F (36.9 °C)  98.4 °F (36.9 °C)    TempSrc: Oral  Oral    SpO2: 98% 96% 99% 100%   Weight:       Height:         No flowsheet data found.    Physical Exam  GENERAL:  Well-developed, well-nourished in no acute distress.  Tremulous  SKIN:  Warm, dry without rashes, purpura or petechiae.  EYES:  Pupils equal, round and reactive to light.  EOMs intact.  Conjunctivae normal.  EARS:  Hearing intact.  NOSE:  Septum midline.  No excoriations or nasal discharge.  MOUTH:  Tongue is well-papillated; no  stomatitis or ulcers.  Lips normal.  THROAT:  Oropharynx without lesions or exudates.  NECK:  Supple with good range of motion; no thyromegaly or masses, no JVD.  LYMPHATICS:  No cervical, supraclavicular, axillary or inguinal adenopathy.  CHEST:  Lungs clear to auscultation. Good airflow.  CARDIAC:  Regular rate and rhythm without murmurs, rubs or gallops. Normal S1,S2.  ABDOMEN:  Soft, firm distended nontender with no hepatosplenomegaly or masses.  Ramey catheter in place  EXTREMITIES:  No clubbing, cyanosis or edema.  NEUROLOGICAL:  Cranial Nerves II-XII grossly intact.  No focal neurological deficits.  PSYCHIATRIC:  Normal affect and mood.               RECENT LABS:  Hematology WBC   Date Value Ref Range Status   04/14/2019 8.13 3.40 - 10.80 10*3/mm3 Final     RBC   Date Value Ref Range Status   04/14/2019 2.55 (L) 4.14 - 5.80 10*6/mm3 Final     Hemoglobin   Date Value Ref Range Status   04/14/2019 7.7 (L) 13.0 - 17.7 g/dL Final     Hematocrit   Date Value Ref Range Status   04/14/2019 25.8 (L) 37.5 - 51.0 % Final     Platelets   Date Value Ref Range Status   04/14/2019 272 140 - 450 10*3/mm3 Final     B12 folate normal-SPEP [id number] normal-PSA normal    D-dimer 2.33    Interpretation Summary 4/6/19    · Chronic right lower extremity deep vein thrombosis noted in the gastrocnemius/soleal.  · All other veins appeared normal bilaterally.  · This was a technically difficult study with somewhat limited imaging.        CT CERVICAL SPINE  IMPRESSION:  1. Multilevel degenerative and arthritic changes appear similar to  previous, no acute fracture or abnormal enhancement.  2. Stable tiny lytic lesions which again may be related to myeloma or  metastatic disease                                              CT THORACIC SPINE     IMPRESSION:  1. Multilevel degenerative changes are similar to previous with minimal  scoliosis and no acute osseous abnormality.  2. Tiny nonspecific lytic lesions are stable from  previous, there is no  abnormal enhancement.         Assessment/Plan   1. Nonspecific bone lesions on spine CT:   · Unable to obtain MRI due to spinal cord stimulator  · 10/24/18 CT cervical spine with multiple small lytic lesions (3-5 millimeters), CT thoracic spine with similar lesions, largest T2 6 millimeters, none evident on CT lumbar spine.  Lesions indeterminate in nature, benign versus malignant.  · Skeletal survey 10/25/18 with no visible lytic or blastic lesions  · CT chest abdomen pelvis 10/25/18 with suspicion of small pulmonary emboli, possible right femoral vein DVT, distention of proximal and mid:, Lobulated lesion inferior margin bladder, possibly related to prostate, redemonstration of lucent foci in thoracic spine, indeterminate.    · Labs 10/25/18 with PSA 1.05, negative serum protein electrophoresis and immunoelectrophoresis with normal quantitative immunoglobulins, normal free light chain ratio.  2. Elevated chromogranin a 10/25/18 of unclear significance (value of 8 just above normal range).  24 hour urine 5-HIAA normal   · Bone lesions of unclear significance at this time,-could be nonsecretory myeloma we will proceed with bone marrow evaluation  3. Anemia:   · B12, folic acid levels are normal.  Iron studies consistent with chronic disease.      · Stool positive for occult blood, no clinical evidence of GI blood loss however.  · Hemoglobin steadily dropping down to 8.0?  Bleeding versus hydration and dilution  ·  anticoagulation-low-dose Lovenox  · -SPEP negative  · Hemoglobin dropping steadily over the last 3 days with dark urine?  Hemolysis haptoglobin normal  · GI to see again for scope based on the recommendations in October-: Dilatation with transition point in the hepatic flexure    3. Left lower lobe pulmonary embolism, right lower extremity femoral/calf DVT 10/25/18:   · Non-angiogram CT chest 10/25/18 with filling defect left lower lobe pulmonary artery and suspicion for femoral DVT  on the right.    · Lower extremity Doppler ultrasound 10/26/18 did confirm extensive deep venous thrombosis in the right leg from the femoral vein down into the calf.   · Lower extremity Doppler with notation of right mid calf mass 2 cm of unclear significance or etiology.  Consider repeat Doppler in future.  · Patient still not very mobile spine rehab after he was hospitalized and immobilized with C. difficile colitis,   ·  on Lovenox but patient discontinued 2 months ago when he ran out of medications and repeat Doppler shows no acute [id number] pending but he is at high risk for DVT again because of his immobility.  Coumadin is a safest oral drug for him but there is no low-dose option therefore I would recommend low-dose Lovenox prophylaxis until he is mobile again  ·   4.Renal insufficiency:  · Patient with fluctuating creatinine ranging from 0.9 up to 1.8 in the past few months  · Today, creatinine improved at 1.18     5.  .Seizure disorder  · On long-term treatment with Dilantin, follows with Dr. Yo as outpatient  · Does not want to change medication  · Interferes with xarelto/eliquis can cause Pseudolymphoma  · Intermittent confusion and possible subclinical seizure     6. Peripheral neuropathy, chronic low back pain, spinal cord stimulator revision 8/27/18     7.  Constipation  Plan     1.   Await Bone marrow biopsy results- It might help explain some of his back pain although I doubt it     2.   GI consult for dropping hemoglobin and abnormal colo on CAT scan    3.  Confusion which is very intermittent etiology unclear?  Seizures subclinical                                     4/14/2019      CC:

## 2019-04-14 NOTE — CONSULTS
Patient Care Team:  Gordy Gupta MD as PCP - General  Amy Guerra APRN as Nurse Practitioner (Pain Medicine)  Harpreet Shay MD as Surgeon (Neurosurgery)  Catracho Yo MD as Consulting Physician (Neurology)  Torrey Santana MD as Consulting Physician (Urology)  Ced Castro MD as Consulting Physician (Pain Medicine)  Willy Garner MD as Referring Physician (Internal Medicine)    CHIEF COMPLAINT: Anemia    HISTORY OF PRESENT ILLNESS:    69 yo Wm admitted for hallucinations suffers from neuropathy and is not ambulatory but had C.Diff in October of 2018 and was to follow up with Dr Rivera in Dec but failed to show. He has dropped his HGB down to 8ish for the last week w/o any obvious GI bleed though he is heme pos he is not iron deficient, his B12 is marginally low and may explain his macrocytosis and his last iron studies were last October.  CT showe only focal dilation of transverse colon assoc with constipation no mass or wall thickencing and again he is not iron deficient. However the plan was to recheck his colon.      Past Medical History:   Diagnosis Date   • Anemia    • Arthritis    • At risk for sleep apnea     STOP BANG 5   • Chronic pain    • Diverticulosis of sigmoid colon 07/14/2011   • DVT (deep venous thrombosis) (CMS/HCC)    • Epilepsy (CMS/HCC)     Since childhood   • History of solitary pulmonary nodule    • History of vitamin D deficiency    • Hyperprolactinemia (CMS/HCC) 2011   • Hypertension    • Hypogonadism in male    • Hypothyroidism    • Injury of back    • Leg pain    • Low back pain     DDD   • Lumbar canal stenosis    • Lumbar radiculopathy    • Lytic bone lesions on xray 2018   • Peripheral neuropathy    • Pulmonary embolism (CMS/HCC)      Past Surgical History:   Procedure Laterality Date   • COLONOSCOPY  2011    1 polyp a 18 cm proximal to anus; sigmoid diverticulosis   • EPIDURAL BLOCK     • HAND SURGERY Right    • PROSTATE SURGERY      Biopsy, benign   •  SPINAL CORD STIMULATOR IMPLANT     • SPINAL CORD STIMULATOR IMPLANT N/A 2018    Procedure: [place] [facility]- Spinal Cord Stimulator revision--battery ;  Surgeon: Ced Castro MD;  Location: [facility];  Service: Pain Management   • THYROID BIOPSY      using Intraspinal Neurostimulator     Family History   Problem Relation Age of Onset   • Heart disease Mother    • Depression Father    • Heart disease Father    • Lung cancer Brother    • Malig Hyperthermia Neg Hx      Social History     Tobacco Use   • Smoking status: Never Smoker   • Smokeless tobacco: Never Used   Substance Use Topics   • Alcohol use: Yes     Comment: Social Drinker   • Drug use: No     Medications Prior to Admission   Medication Sig Dispense Refill Last Dose   • acetaminophen (TYLENOL) 325 MG tablet Take 2 tablets by mouth Every 6 (Six) Hours As Needed for Mild Pain .      • aspirin 325 MG tablet Take 650 mg by mouth Daily.      • DULoxetine (CYMBALTA) 60 MG capsule TAKE TWO CAPSULES BY MOUTH ONCE NIGHTLY 60 capsule 2 2019 at 0800   • levothyroxine (SYNTHROID, LEVOTHROID) 88 MCG tablet TAKE ONE TABLET BY MOUTH DAILY 90 tablet 0 2019 at 0800   • lisinopril-hydrochlorothiazide (PRINZIDE,ZESTORETIC) 20-12.5 MG per tablet Take 1 tablet by mouth Daily. 30 tablet 0 2019 at 0800   • pregabalin (LYRICA) 150 MG capsule Take 2 capsules by mouth 2 (Two) Times a Day. 30 capsule 0 Patient Taking Differently at Unknown time   • Cholecalciferol (VITAMIN D) 2000 UNITS capsule Take 1 capsule by mouth Daily. HOLD PRIOR TO SURG   Past Week at Unknown time   • DILANTIN 100 MG ER capsule Take 5 capsules by mouth Every Night. 150 capsule 0 2019 at 2100   • enoxaparin (LOVENOX) 120 MG/0.8ML solution syringe Inject 0.67 mL under the skin into the appropriate area as directed Every 12 (Twelve) Hours. 12.6 mL     • finasteride (PROSCAR) 5 MG tablet Take 5 mg by mouth Daily.   Past Week at Unknown time     Allergies:  Patient has no known  "allergies.    REVIEW OF SYSTEMS:  Please see the above history of present illness for pertinent positives and negatives.  The remainder of the patient's systems have been reviewed and are negative.     Vital Signs  Temp:  [98.3 °F (36.8 °C)-98.5 °F (36.9 °C)] 98.5 °F (36.9 °C)  Heart Rate:  [83-91] 91  Resp:  [20] 20  BP: (109-111)/(58-67) 111/64    Flowsheet Rows      First Filed Value   Admission Height  172.7 cm (68\") Documented at 04/06/2019 1113   Admission Weight  113 kg (249 lb) Documented at 04/06/2019 1113           Physical Exam:  Physical Exam   Constitutional: Patient appears well-developed and well-nourished and in no acute distress   HEENT:   Head: Normocephalic and atraumatic.   Eyes:  Pupils are equal, round, and reactive to light. EOM are intact. Sclerae are anicteric and non-injected.  Mouth and Throat: Patient has moist mucous membranes. Oropharynx is clear of any erythema or exudate.     Neck: Neck supple. No JVD present. No thyromegaly present. No lymphadenopathy present.  Cardiovascular: Regular rate, regular rhythm, S1 normal and S2 normal.  Exam reveals no gallop and no friction rub.  No murmur heard.  Pulmonary/Chest: Lungs are clear to auscultation bilaterally. No respiratory distress. No wheezes. No rhonchi. No rales.   Abdominal: Soft. Bowel sounds are normal. No distension and no mass. There is no hepatosplenomegaly. There is no tenderness.   Musculoskeletal: Normal Muscle tone  Extremities: No edema. Pulses are palpable in all 4 extremities.  Neurological: Patient is alert and oriented to person, place, and time. Cranial nerves II-XII are grossly intact with no focal deficits.  Skin: Skin is warm. No rash noted. Nails show no clubbing.  No cyanosis or erythema.    Debilities/Disabilities Identified: None  Emotional Behavior: Appropriate     Results Review:    I reviewed the patient's new clinical results.  Lab Results (most recent)     Procedure Component Value Units Date/Time    Urine " Culture - Urine, Urine, Clean Catch [[id number]]  (Abnormal) Collected:  04/13/19 0321    Specimen:  Urine, Clean Catch Updated:  04/14/19 0659     Urine Culture >100,000 CFU/mL Escherichia coli    Basic Metabolic Panel [[id number]]  (Abnormal) Collected:  04/14/19 0519    Specimen:  Blood Updated:  04/14/19 0643     Glucose 97 mg/dL      BUN 25 mg/dL      Creatinine 0.99 mg/dL      Sodium 139 mmol/L      Potassium 4.3 mmol/L      Chloride 102 mmol/L      CO2 26.7 mmol/L      Calcium 8.3 mg/dL      eGFR Non African Amer 75 mL/min/1.73      BUN/Creatinine Ratio 25.3     Anion Gap 10.3 mmol/L     Narrative:       GFR Normal >60  Chronic Kidney Disease <60  Kidney Failure <15    CBC & Differential [[id number]] Collected:  04/14/19 0519    Specimen:  Blood Updated:  04/14/19 0616    Narrative:       The following orders were created for panel order CBC & Differential.  Procedure                               Abnormality         Status                     ---------                               -----------         ------                     CBC Auto Differential[[id number]]        Abnormal            Final result                 Please view results for these tests on the individual orders.    CBC Auto Differential [[id number]]  (Abnormal) Collected:  04/14/19 0519    Specimen:  Blood Updated:  04/14/19 0616     WBC 8.13 10*3/mm3      RBC 2.55 10*6/mm3      Hemoglobin 7.7 g/dL      Hematocrit 25.8 %      .2 fL      MCH 30.2 pg      MCHC 29.8 g/dL      RDW 18.2 %      RDW-SD 63.5 fl      MPV 9.3 fL      Platelets 272 10*3/mm3      Neutrophil % 64.3 %      Lymphocyte % 18.9 %      Monocyte % 8.4 %      Eosinophil % 7.3 %      Basophil % 0.4 %      Immature Grans % 0.7 %      Neutrophils, Absolute 5.23 10*3/mm3      Lymphocytes, Absolute 1.54 10*3/mm3      Monocytes, Absolute 0.68 10*3/mm3      Eosinophils, Absolute 0.59 10*3/mm3      Basophils, Absolute 0.03 10*3/mm3      Immature Grans, Absolute 0.06 10*3/mm3      nRBC  0.0 /100 WBC     Basic Metabolic Panel [[id number]]  (Abnormal) Collected:  04/13/19 0431    Specimen:  Blood Updated:  04/13/19 0538     Glucose 116 mg/dL      BUN 26 mg/dL      Creatinine 1.02 mg/dL      Sodium 142 mmol/L      Potassium 4.2 mmol/L      Chloride 104 mmol/L      CO2 25.4 mmol/L      Calcium 8.5 mg/dL      eGFR Non African Amer 72 mL/min/1.73      BUN/Creatinine Ratio 25.5     Anion Gap 12.6 mmol/L     Narrative:       GFR Normal >60  Chronic Kidney Disease <60  Kidney Failure <15    CBC & Differential [[id number]] Collected:  04/13/19 0431    Specimen:  Blood Updated:  04/13/19 0516    Narrative:       The following orders were created for panel order CBC & Differential.  Procedure                               Abnormality         Status                     ---------                               -----------         ------                     CBC Auto Differential[[id number]]        Abnormal            Final result                 Please view results for these tests on the individual orders.    CBC Auto Differential [[id number]]  (Abnormal) Collected:  04/13/19 0431    Specimen:  Blood Updated:  04/13/19 0516     WBC 7.30 10*3/mm3      RBC 2.74 10*6/mm3      Hemoglobin 8.3 g/dL      Hematocrit 28.0 %      .2 fL      MCH 30.3 pg      MCHC 29.6 g/dL      RDW 18.3 %      RDW-SD 64.5 fl      MPV 9.3 fL      Platelets 259 10*3/mm3      Neutrophil % 64.2 %      Lymphocyte % 16.2 %      Monocyte % 10.1 %      Eosinophil % 7.5 %      Basophil % 0.5 %      Immature Grans % 1.5 %      Neutrophils, Absolute 4.68 10*3/mm3      Lymphocytes, Absolute 1.18 10*3/mm3      Monocytes, Absolute 0.74 10*3/mm3      Eosinophils, Absolute 0.55 10*3/mm3      Basophils, Absolute 0.04 10*3/mm3      Immature Grans, Absolute 0.11 10*3/mm3      nRBC 0.0 /100 WBC     Urinalysis With Culture If Indicated - Urine, Clean Catch [[id number]]  (Abnormal) Collected:  04/13/19 0321    Specimen:  Urine, Clean Catch Updated:  04/13/19  0346     Color, UA Yellow     Appearance, UA Cloudy     pH, UA <=5.0     Specific Gravity, UA 1.016     Glucose, UA Negative     Ketones, UA Negative     Bilirubin, UA Negative     Blood, UA Small (1+)     Protein, UA Trace     Leuk Esterase, UA Large (3+)     Nitrite, UA Positive     Urobilinogen, UA 1.0 E.U./dL    Urinalysis, Microscopic Only - Urine, Clean Catch [[id number]]  (Abnormal) Collected:  04/13/19 0321    Specimen:  Urine, Clean Catch Updated:  04/13/19 0346     RBC, UA 3-5 /HPF      WBC, UA Too Numerous to Count /HPF      Bacteria, UA 4+ /HPF      Squamous Epithelial Cells, UA 0-2 /HPF      Hyaline Casts, UA 0-2 /LPF      Methodology Automated Microscopy    Hemoglobin & Hematocrit, Blood [[id number]]  (Abnormal) Collected:  04/12/19 1516    Specimen:  Blood Updated:  04/12/19 1622     Hemoglobin 8.1 g/dL      Hematocrit 26.8 %     MYNOR With / DsDNA, RNP, Sjogrens A / B, Meraz [[id number]] Collected:  04/12/19 1537    Specimen:  Blood Updated:  04/12/19 1612    Reticulocytes [[id number]]  (Abnormal) Collected:  04/12/19 0615    Specimen:  Blood Updated:  04/12/19 1225     Reticulocyte % 3.91 %      Reticulocyte Absolute 0.1005 10*6/mm3     Tissue Pathology Exam [[id number]] Collected:  04/11/19 1532    Specimen:  Bone Marrow Aspirate from Iliac Crest, Right - Biopsy Updated:  04/11/19 2125    Blood Culture - Blood, Arm, Right [[id number]] Collected:  04/06/19 1523    Specimen:  Blood from Arm, Right Updated:  04/11/19 1545     Blood Culture No growth at 5 days    Blood Culture - Blood, Arm, Left [[id number]] Collected:  04/06/19 1523    Specimen:  Blood from Arm, Left Updated:  04/11/19 1530     Blood Culture No growth at 5 days    Protime-INR [[id number]]  (Normal) Collected:  04/11/19 0232    Specimen:  Blood Updated:  04/11/19 0457     Protime 13.2 Seconds      INR 1.03    aPTT [[id number]]  (Normal) Collected:  04/11/19 0232    Specimen:  Blood Updated:  04/11/19 0457     PTT 35.0 seconds     Occult Blood X  1, Stool - Stool, Per Rectum [[id number]]  (Normal) Collected:  04/10/19 1544    Specimen:  Stool from Per Rectum Updated:  04/10/19 1705     Fecal Occult Blood Negative    Haptoglobin [[id number]]  (Abnormal) Collected:  04/10/19 1141    Specimen:  Blood Updated:  04/10/19 1228     Haptoglobin 296 mg/dL     Lactate Dehydrogenase [[id number]]  (Abnormal) Collected:  04/10/19 1141    Specimen:  Blood Updated:  04/10/19 1222      U/L     Reticulocytes [[id number]]  (Abnormal) Collected:  04/10/19 1141    Specimen:  Blood Updated:  04/10/19 1156     Reticulocyte % 3.68 %      Reticulocyte Absolute 0.0876 10*6/mm3     CK [[id number]]  (Abnormal) Collected:  04/10/19 0344    Specimen:  Blood from Arm, Right Updated:  04/10/19 0504     Creatine Kinase 910 U/L     D-dimer, Quantitative [[id number]]  (Abnormal) Collected:  04/08/19 1201    Specimen:  Blood Updated:  04/08/19 1234     D-Dimer, Quantitative 2.33 MCGFEU/mL     Narrative:       The [facility] D-Dimer test used in conjunction with a clinical pretest probability (PTP) assessment model, has been approved by the FDA to rule out the presence of venous thromboembolism (VTE) in outpatients suspected of deep venous thrombosis (DVT) or pulmonary embolism (PE). The cut-off for negative predictive value is <0.50 MCGFEU/mL.    Vitamin B12 [[id number]]  (Normal) Collected:  04/07/19 0505    Specimen:  Blood Updated:  04/07/19 0607     Vitamin B-12 335 pg/mL     Folate [[id number]]  (Normal) Collected:  04/07/19 0505    Specimen:  Blood Updated:  04/07/19 0607     Folate 18.70 ng/mL     POC Glucose Once [[id number]]  (Normal) Collected:  04/07/19 0411    Specimen:  Blood Updated:  04/07/19 0412     Glucose 104 mg/dL     Troponin [[id number]]  (Normal) Collected:  04/06/19 2009    Specimen:  Blood Updated:  04/06/19 2044     Troponin T 0.016 ng/mL     Narrative:       Troponin T Reference Range:  <= 0.03 ng/mL-   Negative for AMI  >0.03 ng/mL-     Abnormal for myocardial necrosis.   "Clinicians would have to utilize clinical acumen, EKG, Troponin and serial changes to determine if it is an Acute Myocardial Infarction or myocardial injury due to an underlying chronic condition.     TSH [[id number]]  (Abnormal) Collected:  04/06/19 1200    Specimen:  Blood Updated:  04/06/19 2010     TSH 5.280 mIU/mL     POC Glucose Once [[id number]]  (Normal) Collected:  04/06/19 1821    Specimen:  Blood Updated:  04/06/19 1825     Glucose 102 mg/dL     Lactic Acid, Plasma [[id number]]  (Normal) Collected:  04/06/19 1523    Specimen:  Blood Updated:  04/06/19 1616     Lactate 1.5 mmol/L     Procalcitonin [[id number]]  (Normal) Collected:  04/06/19 1200    Specimen:  Blood Updated:  04/06/19 1559     Procalcitonin 0.18 ng/mL     Narrative:       As a Marker for Sepsis (Non-Neonates):   1. <0.5 ng/mL represents a low risk of severe sepsis and/or septic shock.  1. >2 ng/mL represents a high risk of severe sepsis and/or septic shock.    As a Marker for Lower Respiratory Tract Infections that require antibiotic therapy:  PCT on Admission     Antibiotic Therapy             6-12 Hrs later  > 0.5                Strongly Recommended            >0.25 - <0.5         Recommended  0.1 - 0.25           Discouraged                   Remeasure/reassess PCT  <0.1                 Strongly Discouraged          Remeasure/reassess PCT      As 28 day mortality risk marker: \"Change in Procalcitonin Result\" (> 80 % or <=80 %) if Day 0 (or Day 1) and Day 4 values are available. Refer to http://[email or web address]-pct-calculator.com/   Change in PCT <=80 %   A decrease of PCT levels below or equal to 80 % defines a positive change in PCT test result representing a higher risk for 28-day all-cause mortality of patients diagnosed with severe sepsis or septic shock.  Change in PCT > 80 %   A decrease of PCT levels of more than 80 % defines a negative change in PCT result representing a lower risk for 28-day all-cause mortality of patients diagnosed with " severe sepsis or septic shock.                Urine Drug Screen - Urine, Clean Catch [[id number]]  (Normal) Collected:  04/06/19 1423    Specimen:  Urine, Clean Catch Updated:  04/06/19 1525     Amphet/Methamphet, Screen Negative     Barbiturates Screen, Urine Negative     Benzodiazepine Screen, Urine Negative     Cocaine Screen, Urine Negative     Opiate Screen Negative     THC, Screen, Urine Negative     Methadone Screen, Urine Negative     Oxycodone Screen, Urine Negative    Narrative:       Negative Thresholds For Drugs Screened:     Amphetamines               500 ng/ml   Barbiturates               200 ng/ml   Benzodiazepines            100 ng/ml   Cocaine                    300 ng/ml   Methadone                  300 ng/ml   Opiates                    300 ng/ml   Oxycodone                  100 ng/ml   THC                        50 ng/ml    The Normal Value for all drugs tested is negative. This report includes final unconfirmed screening results to be used for medical treatment purposes only. Unconfirmed results must not be used for non-medical purposes such as employment or legal testing. Clinical consideration should be applied to any drug of abuse test, particulary when unconfirmed results are used.    Urinalysis With Microscopic If Indicated (No Culture) - Urine, Clean Catch [[id number]]  (Normal) Collected:  04/06/19 1423    Specimen:  Urine, Clean Catch Updated:  04/06/19 1441     Color, UA Yellow     Appearance, UA Clear     pH, UA <=5.0     Specific Gravity, UA 1.020     Glucose, UA Negative     Ketones, UA Negative     Bilirubin, UA Negative     Blood, UA Negative     Protein, UA Negative     Leuk Esterase, UA Negative     Nitrite, UA Negative     Urobilinogen, UA 0.2 E.U./dL    Narrative:       Urine microscopic not indicated.    Ethanol [[id number]] Collected:  04/06/19 1200    Specimen:  Blood Updated:  04/06/19 1415     Ethanol <10 mg/dL      Ethanol % <0.010 %     [place] Draw [[id number]] Collected:   04/06/19 1200    Specimen:  Blood Updated:  04/06/19 1315    Narrative:       The following orders were created for panel order [place] Draw.  Procedure                               Abnormality         Status                     ---------                               -----------         ------                     Light Blue Top[[id number]]                                   Final result               Green Top (Gel)[[id number]]                                  Final result               Lavender Top[[id number]]                                     Final result               Gold Top - SST[[id number]]                                   Final result                 Please view results for these tests on the individual orders.    Gold Top - SST [[id number]] Collected:  04/06/19 1200    Specimen:  Blood Updated:  04/06/19 1315     Extra Tube Hold for add-ons.     Comment: Auto resulted.       Light Blue Top [[id number]] Collected:  04/06/19 1200    Specimen:  Blood Updated:  04/06/19 1315     Extra Tube hold for add-on     Comment: Auto resulted       Green Top (Gel) [[id number]] Collected:  04/06/19 1200    Specimen:  Blood Updated:  04/06/19 1315     Extra Tube Hold for add-ons.     Comment: Auto resulted.       Lavender Top [[id number]] Collected:  04/06/19 1200    Specimen:  Blood Updated:  04/06/19 1315     Extra Tube hold for add-on     Comment: Auto resulted       Comprehensive Metabolic Panel [[id number]]  (Abnormal) Collected:  04/06/19 1200    Specimen:  Blood Updated:  04/06/19 1236     Glucose 122 mg/dL      BUN 28 mg/dL      Creatinine 1.55 mg/dL      Sodium 140 mmol/L      Potassium 4.0 mmol/L      Chloride 100 mmol/L      CO2 22.7 mmol/L      Calcium 9.6 mg/dL      Total Protein 7.0 g/dL      Albumin 3.60 g/dL      ALT (SGPT) 25 U/L      AST (SGOT) 22 U/L      Alkaline Phosphatase 60 U/L      Total Bilirubin 0.3 mg/dL      eGFR Non African Amer 45 mL/min/1.73      Globulin 3.4 gm/dL      A/G Ratio 1.1 g/dL       BUN/Creatinine Ratio 18.1     Anion Gap 17.3 mmol/L     Narrative:       GFR Normal >60  Chronic Kidney Disease <60  Kidney Failure <15    Phenytoin Level, Total [[id number]]  (Normal) Collected:  04/06/19 1200    Specimen:  Blood Updated:  04/06/19 1236     Phenytoin Level 10.9 mcg/mL     Troponin [[id number]]  (Normal) Collected:  04/06/19 1200    Specimen:  Blood Updated:  04/06/19 1236     Troponin T 0.015 ng/mL     Narrative:       Troponin T Reference Range:  <= 0.03 ng/mL-   Negative for AMI  >0.03 ng/mL-     Abnormal for myocardial necrosis.  Clinicians would have to utilize clinical acumen, EKG, Troponin and serial changes to determine if it is an Acute Myocardial Infarction or myocardial injury due to an underlying chronic condition.     BNP [[id number]]  (Normal) Collected:  04/06/19 1200    Specimen:  Blood Updated:  04/06/19 1233     proBNP 423.1 pg/mL     Narrative:       Among patients with dyspnea, NT-proBNP is highly sensitive for the detection of acute congestive heart failure. In addition NT-proBNP of <300 pg/ml effectively rules out acute congestive heart failure with 99% negative predictive value.          Imaging Results (most recent)     Procedure Component Value Units Date/Time    CT Thoracic Spine With & Without Contrast [[id number]] Collected:  04/09/19 2247     Updated:  04/12/19 0539    Narrative:       NONCONTRAST AND CONTRAST CT SCAN THORACIC SPINE     CLINICAL HISTORY: lytic lesions; R44.3-Hallucinations, unspecified;  R53.1-Weakness; M79.604-Pain in right leg; M79.605-Pain in left leg;  Z74.09-Other reduced mobility     COMPARISON: 10/24/2018     TECHNIQUE: Radiation dose reduction techniques were utilized, including  automated exposure control and exposure modulation based on body size.  Axial noncontrast images of the thoracic spine were obtained with and  without contrast. Sagittal reformatted images were supplemented.     FINDINGS: Stimulator leads are again noted dorsally terminating  near the  upper margin of T8, unchanged. No acute vertebral fracture identified on  the axial series. There is minimal S-shaped scoliosis on the coronal  reformatted images..     The vertebrae are well aligned and well maintained in height and stature  on the sagittal reformatted images.  No significant compression  deformity or retropulsion.     Multilevel degenerative disc disease changes are again noted. In general  these are more pronounced in the mid and lower thoracic intervertebral  disc levels. A multitude of large bridging osteophytes again noted  suggestive of DISH and unchanged from previous in appearance. The small,  subcentimeter lytic lesions in the thoracic vertebrae are unchanged from  previous. Again findings could be related to myeloma or metastatic  disease.     There is no abnormal enhancement..          Impression:       1. Multilevel degenerative changes are similar to previous with minimal  scoliosis and no acute osseous abnormality.  2. Tiny nonspecific lytic lesions are stable from previous, there is no  abnormal enhancement.     This report was finalized on 4/12/2019 5:36 AM by Rashid Leach M.D.       CT Guided Biopsy Bone Marrow [[id number]] Collected:  04/11/19 1623     Updated:  04/11/19 1628    Narrative:       CT GUIDED BIOPSY BONE MARROW- 4/11/2019 3:01 PM     INDICATION: 68-year-old male with Lytic lesions; R44.3-Hallucinations,  unspecified; R53.1-Weakness; M79.604-Pain in right leg; M79.605-Pain in  left leg; Z74.09-Other reduced mobility; V03-[id number] neoplasm of  prostate .     CONSENT: An informed written consent was obtained from the patient after  discussing the risks, benefits, potential complications and  alternatives. All questions answered to the patient's satisfaction.       COMPLICATIONS: None.      ESTIMATED BLOOD LOSS: Less than 5 ml.      PROCEDURE: An informed consent was obtained as above. Prior to sterile  preparation and local anesthetic, noncontrast axial CT  scans were  obtained. The optimal site for biopsy was marked. An 11-gauge needle was  advanced into the ilium with a drill. Approximately 10 mL of bone marrow  were aspirated through the needle. Subsequently, a 2 cm core was  obtained utilizing the drill with a 11-gauge sampling needle.     No immediate complications.        Impression:       Successful CT guided bone marrow aspirate and bone biopsy.      Radiation dose reduction techniques were utilized, including automated  exposure control and exposure modulation based on body size.        This report was finalized on 4/11/2019 4:25 PM by Dr. Demetrius Cali MD.       CT Cervical Spine With Contrast [[id number]] Collected:  04/09/19 2233     Updated:  04/09/19 2233    Narrative:       NONCONTRAST and contrast CT SCAN CERVICAL SPINE     CLINICAL HISTORY: lytic lesions; R44.3-Hallucinations, unspecified;  R53.1-Weakness; M79.604-Pain in right leg; M79.605-Pain in left leg;  Z74.09-Other reduced mobility     COMPARISON: 10/24/2018     TECHNIQUE: Radiation dose reduction techniques were utilized, including  automated exposure control and exposure modulation based on body size.  Axial noncontrast images of the cervical spine were obtained without  contrast. Sagittal reformatted images were supplemented. Exam was  repeated following IV administration of contrast.     FINDINGS:  No acute vertebral fracture identified on the axial series.  The previously described tiny lytic lesions are unchanged in size and  appearance from the previous examination. There is minimal S-shaped  cervicothoracic scoliosis on the coronal reformatted images.     There is 1-2 mm of anterolisthesis at C3-4 and C4-5 which is unchanged.  Normal alignment otherwise.  No significant compression deformity or  retropulsion.     Multilevel degenerative and arthritic changes are present. Again  degenerative disc disease changes are particularly pronounced at C5-6  and C6-7 with moderate to severe disc  height loss and more extensive  spurring at these levels in particular. Facet arthropathy is more  pronounced in the upper and mid cervical levels and slightly more  pronounced to the left of midline. Osteophytic spurring contributes to  multilevel canal and foraminal narrowing, similar to previous. There is  no abnormal enhancement..          Impression:       1. Multilevel degenerative and arthritic changes appear similar to  previous, no acute fracture or abnormal enhancement.  2. Stable tiny lytic lesions which again may be related to myeloma or  metastatic disease.          CT Cervical Spine Without Contrast [[id number]] Collected:  04/09/19 2233     Updated:  04/09/19 2233    Narrative:       NONCONTRAST and contrast CT SCAN CERVICAL SPINE     CLINICAL HISTORY: lytic lesions; R44.3-Hallucinations, unspecified;  R53.1-Weakness; M79.604-Pain in right leg; M79.605-Pain in left leg;  Z74.09-Other reduced mobility     COMPARISON: 10/24/2018     TECHNIQUE: Radiation dose reduction techniques were utilized, including  automated exposure control and exposure modulation based on body size.  Axial noncontrast images of the cervical spine were obtained without  contrast. Sagittal reformatted images were supplemented. Exam was  repeated following IV administration of contrast.     FINDINGS:  No acute vertebral fracture identified on the axial series.  The previously described tiny lytic lesions are unchanged in size and  appearance from the previous examination. There is minimal S-shaped  cervicothoracic scoliosis on the coronal reformatted images.     There is 1-2 mm of anterolisthesis at C3-4 and C4-5 which is unchanged.  Normal alignment otherwise.  No significant compression deformity or  retropulsion.     Multilevel degenerative and arthritic changes are present. Again  degenerative disc disease changes are particularly pronounced at C5-6  and C6-7 with moderate to severe disc height loss and more extensive  spurring  at these levels in particular. Facet arthropathy is more  pronounced in the upper and mid cervical levels and slightly more  pronounced to the left of midline. Osteophytic spurring contributes to  multilevel canal and foraminal narrowing, similar to previous. There is  no abnormal enhancement..          Impression:       1. Multilevel degenerative and arthritic changes appear similar to  previous, no acute fracture or abnormal enhancement.  2. Stable tiny lytic lesions which again may be related to myeloma or  metastatic disease.          CT Abdomen Pelvis Without Contrast [[id number]] Collected:  04/07/19 2257     Updated:  04/07/19 2309    Narrative:       CT OF THE ABDOMEN AND PELVIS WITHOUT CONTRAST     HISTORY: Urinary retention     COMPARISON: 10/25/2018     TECHNIQUE: Axial CT imaging was obtained from the diaphragm to the  symphysis pubis. No IV contrast was administered.     FINDINGS:  Exam is significantly degraded by motion artifact. The patient has  bibasilar scarring and atelectasis. The stomach and proximal small bowel  appear unremarkable, as are the adrenal glands. The main pulmonary  arteries are large, which can be seen in setting of pulmonary arterial  hypertension. No focal hepatic lesions are seen. Spleen is unremarkable.  Pancreas is atrophic. No hydronephrosis is identified on either side.  Left renal cysts are noted. Right kidney appears unremarkable. No renal  stones are seen. There is a Ramey catheter present within the urinary  bladder. There is some residual urine seen within the bladder. The  prostate gland is within normal limits. The patient has a large volume  of fecal material seen throughout the colon, which may reflect some  constipation. However, I see no convincing evidence of mechanical small  bowel obstruction. As was identified on prior CT, the patient does have  some dilatation of the transverse colon, with an apparent transition  point seen at the hepatic flexure. Similar  findings were present on the  prior exam. The appendix is visualized, and is within normal limits. No  free fluid or adenopathy seen pelvis. No aggressive osseous  abnormalities are seen. There are advanced degenerative changes  involving the hips bilaterally. Spinal nerve stimulator is noted.       Impression:          1. No hydronephrosis seen on either side. Ramey catheter is present  within the urinary bladder. Small amount of residual urine is seen  within the bladder.  2. The patient has extensive fecal burden throughout the proximal colon,  with gaseous distention of the transverse colon again noted. Apparent  transition point is located at the splenic flexure. I see no obvious  obstructing lesion, but the patient had a similar configuration on prior  exam from October 2018. Further evaluation with colonoscopy could be  considered to evaluate for any underlying lesion, particularly if the  patient has any bowel symptoms The distal colon appears relatively  decompressed.     Radiation dose reduction techniques were utilized, including automated  exposure control and exposure modulation based on body size.     This report was finalized on 4/7/2019 11:06 PM by Dr. Sandra Mcwilliams M.D.       XR Chest 2 View [[id number]] Collected:  04/06/19 1415     Updated:  04/06/19 1419    Narrative:       XR CHEST 2 VW-  04/06/2019      HISTORY: Shortness of breath.     FINDINGS: Heart size is mildly enlarged. Lungs are underinflated with  some vascular crowding. There is elevation of the right hemidiaphragm.  No focal infiltrates are seen.       Impression:       1. Underinflation of the lungs.  2. Mild cardiomegaly.  3. No acute process identified. Chest appears unchanged from the  10/21/2018 study.     This report was finalized on 4/6/2019 2:15 PM by Dr. Frank Lebron M.D.       CT Head Without Contrast [[id number]] Collected:  04/06/19 1353     Updated:  04/06/19 1400    Narrative:       CT SCAN OF THE BRAIN WITHOUT  CONTRAST     HISTORY: Confusion.     TECHNIQUE: The CT scan was performed as an emergency procedure through  the brain without contrast.      FINDINGS: There is very mild diffuse atrophy and chronic small vessel  ischemic change similar to the MRI scan dated 07/11/2011. There is no  evidence of acute intracranial hemorrhage or mass effect and the  visualized sinuses are clear.                 Radiation dose reduction techniques were utilized, including automated  exposure control and exposure modulation based on body size.     This report was finalized on 4/6/2019 1:57 PM by Dr. Marco Adam M.D.           reviewed    ECG/EMG Results (most recent)     Procedure Component Value Units Date/Time    ECG 12 Lead [[id number]] Collected:  04/06/19 1328     Updated:  04/07/19 1733    Narrative:       HEART RATE= 91  bpm  RR Interval= 660  ms  CA Interval= 163  ms  P Horizontal Axis= 21  deg  P Front Axis= 50  deg  QRSD Interval= 102  ms  QT Interval= 380  ms  QRS Axis= 27  deg  T Wave Axis= 15  deg  - ABNORMAL ECG -  Sinus rhythm  possible Inferior infarct, old - inferior Q wave more prominent  NON-SPECIFIC ST-T WAVE CHANGES  Electronically Signed By: Alfie Hernandez ([facility]) 07-Apr-2019 17:33:12  Date and Time of Study: 2019-04-06 13:28:36    SCANNED EKG [[id number]] Resulted:  04/06/19      Updated:  04/08/19 0730        reviewed    Assessment/Plan     Anemia  History of C.Diff  Hallucinations  Neuropathy/ Non ambulatory    Agree with Hematology that an in Pt colon is all we will ever get ( vs OP compliance) so will proceed to bowel prep on Mon for a colon on tues. The Pt is amenable to this.  Will send off MMA and recheck Iron studies.    I discussed the patients findings and my recommendations with patient.     Jus Weller MD  04/14/19  3:29 PM    Time: Not recorded

## 2019-04-14 NOTE — PROGRESS NOTES
"DAILY PROGRESS NOTE  [facility]    Patient Identification:  Name: Chava Stone Sr.  Age: 68 y.o.  Sex: male  :  1950  MRN: [id number]         Primary Care Physician: Gordy Gupta MD      Subjective  He only complains of chronic back pain today.  Has not walked since .  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  He denies any chest pain or palpitations.  No shortness of breath or cough.  No nausea or vomiting.  Objective:  General Appearance:  Comfortable, well-appearing, in no acute distress and not in pain.    Vital signs: (most recent): Blood pressure 110/60, pulse 90, temperature 98.4 °F (36.9 °C), temperature source Oral, resp. rate 20, height 172.7 cm (68\"), weight 116 kg (255 lb), SpO2 99 %.  No fever.    Lungs:  Normal effort and normal respiratory rate.    Heart: Normal rate.  Regular rhythm.    Abdomen: Abdomen is soft and non-distended.  There is no abdominal tenderness.     Extremities: There is no dependent edema.    Neurological: (Patient sleeping on entering the room.  Awakens fairly easily.  Oriented to person place and time.   with a rather peculiar affect.).    Skin:  Warm and dry.                Vital signs in last 24 hours:  Temp:  [98.3 °F (36.8 °C)-98.4 °F (36.9 °C)] 98.4 °F (36.9 °C)  Heart Rate:  [87-91] 90  Resp:  [20] 20  BP: (109-110)/(58-67) 110/60    Intake/Output:    Intake/Output Summary (Last 24 hours) at 2019 1038  Last data filed at 2019 0726  Gross per 24 hour   Intake 310 ml   Output --   Net 310 ml         Results from last 7 days   Lab Units 19  0519 19  0431 19  1516 19  0615 19  0232 04/10/19  0344 19  0337 19  0505   WBC 10*3/mm3 8.13 7.30  --  8.83 9.40 10.44 13.29* 13.06*   HEMOGLOBIN g/dL 7.7* 8.3* 8.1* 7.9* 8.3* 7.3* 8.0* 9.3*   PLATELETS 10*3/mm3 272 259  --  264 237 201 215 203     Results from last 7 days   Lab Units " 04/14/19  0519 04/13/19  0431 04/12/19  0615 04/11/19  0232 04/10/19  0344 04/09/19  0337 04/08/19  0505   SODIUM mmol/L 139 142 140 142 140 141 139   POTASSIUM mmol/L 4.3 4.2 4.2 4.8 4.3 3.8 4.0   CHLORIDE mmol/L 102 104 103 105 103 102 103   CO2 mmol/L 26.7 25.4 22.3 24.5 25.1 19.5* 18.3*   BUN mg/dL 25* 26* 27* 31* 22 21 23   CREATININE mg/dL 0.99 1.02 1.18 1.28* 1.08 1.11 1.29*   GLUCOSE mg/dL 97 116* 122* 175* 108* 118* 125*   Estimated Creatinine Clearance: 87.1 mL/min (by C-G formula based on SCr of 0.99 mg/dL).  Results from last 7 days   Lab Units 04/14/19  0519 04/13/19  0431 04/12/19  0615 04/11/19  0232 04/10/19  0344 04/09/19  0337 04/08/19  0505   CALCIUM mg/dL 8.3* 8.5* 8.6 8.9 8.8 8.9 9.2         Assessment:  Mental status changes - off and on.  Metabolic encephalopathy, consider psychosis.      Anemia  -S/p bone marrow bx.  Path pending.   -Trans 1 unit 4/10/19  -Hematology following     Chronic pain  -Oral pain medication as needed  -He has a pain pump     H/o PE- and had clot in 10/2018  -presently on prof doses of Lovenox.   -No blood clots on lower extremity Doppler  -+ D-dimer  -Patient cannot take Eliquis or Xarelto because of Dilantin  -off AC since he's had 5 months treatment     Hxo Seizures disorder -Dilantin  -no seizures in 15 years ago  -Dilantin level is okay, no nystagmus on exam     Hypothyroidism  - continue Synthroid     Acute renal failure  -Resolved.     Urinary retention -probable neurogenic bladder: Urology input appreciated.  Ramey catheter presently out.      UTI status post Ramey catheter: Patient not a reliable historian concerning symptoms.  I will go ahead and treat with oral antibiotics.     Nonspecific bone lesions on spine CT:     mental status issues.  We will request a psychiatric reconsultation.    Discussed with nursing staff.    Quang Rodgers MD  4/14/2019  10:38 AM

## 2019-04-15 PROBLEM — R19.5 HEME POSITIVE STOOL: Status: ACTIVE | Noted: 2019-01-01

## 2019-04-15 NOTE — PLAN OF CARE
Problem: Patient Care Overview  Goal: Plan of Care Review   04/15/19 9524   Coping/Psychosocial   Plan of Care Reviewed With patient   OTHER   Outcome Summary Attempted to sit EOB today with maxAx2- only able to tolerate sitting up for a few seconds due to pain level. RN aware. Will continue to advance as able.

## 2019-04-15 NOTE — THERAPY TREATMENT NOTE
Acute Care - Physical Therapy Treatment Note  [facility]     Patient Name: Chava Stone Sr.  : 1950  MRN: [id number]  Today's Date: 4/15/2019  Onset of Illness/Injury or Date of Surgery: 19  Date of Referral to PT: 19  Referring Physician: Dr Hackett     Admit Date: 2019    Visit Dx:    ICD-10-CM ICD-9-CM   1. Hallucinations R44.3 780.1   2. General weakness R53.1 780.79   3. Leg pain, bilateral M79.604 729.5    M79.605    4. Impaired mobility Z74.09 799.89   5. Malignant neoplasm of prostate (CMS/HCC) C61 185   6. Heme positive stool R19.5 792.1     Patient Active Problem List   Diagnosis   • Spinal stenosis of lumbar region   • Lumbar radiculopathy   • Spinal cord stimulator status   • Pain in both lower extremities   • Chronic pain   • Postlaminectomy syndrome of lumbar region   • Benign essential hypertension   • Colon polyp   • Diverticulosis of large intestine   • Elevated prostate specific antigen (PSA)   • Psychomotor epilepsy (CMS/HCC)   • Hyperprolactinemia (CMS/HCC)   • Hypogonadism in male   • Acquired hypothyroidism   • Malignant neoplasm of prostate (CMS/HCC)   • Solitary pulmonary nodule   • Vitamin D deficiency   • Battery end of life of spinal cord stimulator   • Weakness   • Peripheral neuropathy   • Diarrhea   • C. difficile colitis   • Pulmonary embolus (CMS/HCC)   • Acute deep vein thrombosis (DVT) of distal end of right lower extremity (CMS/HCC)   • Hallucinations   • Hx pulmonary embolism   • Seizure disorder (CMS/HCC)   • Leukocytosis   • Heme positive stool       Therapy Treatment    Rehabilitation Treatment Summary     Row Name 04/15/19 1637             Treatment Time/Intention    Discipline  physical therapist  -MS      Document Type  therapy note (daily note)  -MS      Subjective Information  no complaints  -MS      Mode of Treatment  individual therapy;physical therapy  -MS      Patient/Family Observations  Supine in bed with HOB elevated, NAD, exit alarm  on  -MS      Care Plan Review  patient/other agree to care plan  -MS      Therapy Frequency (PT Clinical Impression)  3 times/wk  -MS      Patient Effort  adequate  -MS      Existing Precautions/Restrictions  fall  -MS      Recorded by [MS] Juanita Pang, PT 04/15/19 1643      Row Name 04/15/19 1637             Cognitive Assessment/Intervention- PT/OT    Orientation Status (Cognition)  -- pt didnt verbalize, moans  -MS      Follows Commands (Cognition)  follows one step commands;75-90% accuracy;verbal cues/prompting required;physical/tactile prompts required;repetition of directions required  -MS      Safety Deficit (Cognitive)  moderate deficit;at risk behavior observed;insight into deficits/self awareness  -MS      Personal Safety Interventions  fall prevention program maintained;gait belt;nonskid shoes/slippers when out of bed  -MS      Recorded by [MS] Juanita Pang, PT 04/15/19 1643      Row Name 04/15/19 1637             Safety Issues, Functional Mobility    Safety Issues Affecting Function (Mobility)  ability to follow commands;awareness of need for assistance;insight into deficits/self awareness;judgment  -MS      Impairments Affecting Function (Mobility)  cognition;balance;strength;endurance/activity tolerance;pain;postural/trunk control  -MS      Recorded by [MS] Juanita Pang, PT 04/15/19 1643      Row Name 04/15/19 1637             Bed Mobility Assessment/Treatment    Supine-Sit [place] (Bed Mobility)  maximum assist (25% patient effort);2 person assist;verbal cues;nonverbal cues (demo/gesture)  -MS      Sit-Supine [place] (Bed Mobility)  maximum assist (25% patient effort);2 person assist;verbal cues;nonverbal cues (demo/gesture)  -MS      Bed Mobility, Safety Issues  decreased use of legs for bridging/pushing;impaired trunk control for bed mobility  -MS      Assistive Device (Bed Mobility)  bed rails;head of bed elevated  -MS      Comment (Bed Mobility)  Unable to sit up longer  than a few seconds due to excruciating pain- moans and crying out. Patient then requested to lay back down.  -MS      Recorded by [MS] Juanita Pang, PT 04/15/19 1643      Row Name 04/15/19 1637             Motor Skills Assessment/Interventions    Additional Documentation  Balance (Group);Balance Interventions (Group)  -MS      Recorded by [MS] Juanita Pang, PT 04/15/19 1643      Row Name 04/15/19 1637             Balance    Balance  static sitting balance  -MS      Recorded by [MS] Juanita Pang, PT 04/15/19 1643      Row Name 04/15/19 1637             Static Sitting Balance    Level of [place] (Supported Sitting, Static Balance)  moderate assist, 50 to 74% patient effort;maximal assist, 25 to 49% patient effort  -MS      Sitting Position (Supported Sitting, Static Balance)  sitting on edge of bed  -MS      Time Able to Maintain Position (Supported Sitting, Static Balance)  less than 15 seconds  -MS      Recorded by [MS] Juanita Pang, PT 04/15/19 1643      Row Name 04/15/19 1637             Positioning and Restraints    Pre-Treatment Position  in bed  -MS      Post Treatment Position  bed  -MS      In Bed  fowlers;call light within reach;encouraged to call for assist;exit alarm on;notified nsg  -MS      Recorded by [MS] Juanita Pang, PT 04/15/19 1643      Row Name 04/15/19 1637             Pain Scale: Numbers Pre/Post-Treatment    Pain Location - Orientation  lower  -MS      Pain Location  back  -MS      Pain Intervention(s)  Repositioned;Ambulation/increased activity  -MS      Recorded by [MS] Juanita Pang, PT 04/15/19 1643      Row Name 04/15/19 1637             Pain Scale: Word Pre/Post-Treatment    Pain: Word Scale, Pretreatment  4 - moderate pain  -MS      Pain: Word Scale, Post-Treatment  4 - moderate pain  -MS      Recorded by [MS] Juanita Pang, PT 04/15/19 1643      Row Name 04/15/19 1637             Plan of Care Review    Plan of Care Reviewed With  patient  -MS       Recorded by [MS] Juanita Pang, PT 04/15/19 1643      Row Name 04/15/19 1637             Outcome Summary/Treatment Plan (PT)    Daily Summary of Progress (PT)  unable to show any progress toward functional goals  -MS      Recorded by [MS] Juanita Pang, PT 04/15/19 1643        User Key  (r) = Recorded By, (t) = Taken By, (c) = Cosigned By    Initials Name Effective Dates Discipline    MS Juanita Pang, PT 03/04/19 -  PT               Rehab Goal Summary     Row Name 04/15/19 1200             Occupational Therapy Goals    Bathing Goal Selection (OT)  bathing, OT goal 1  -SK      Grooming Goal Selection (OT)  grooming, OT goal 1  -SK      Activity Tolerance Goal Selection (OT)  activity tolerance, OT goal 1  -SK         Bathing Goal 1 (OT)    Activity/Assistive Device (Bathing Goal 1, OT)  upper body bathing;lower body bathing;long-handled sponge  -SK      [place] Level/Cues Needed (Bathing Goal 1, OT)  moderate assist (50-74% patient effort)  -SK      Time Frame (Bathing Goal 1, OT)  1 week  -SK      Progress/Outcomes (Bathing Goal 1, OT)  goal ongoing  -SK         Grooming Goal 1 (OT)    Activity/Device (Grooming Goal 1, OT)  oral care  -SK      [place] (Grooming Goal 1, OT)  set-up required  -SK      Time Frame (Grooming Goal 1, OT)  1 week  -SK      Progress/Outcome (Grooming Goal 1, OT)  goal ongoing  -SK          Activity Tolerance Goal 1 (OT)    Activity Tolerance Goal 1 (OT)  Pt tolerate sitting EOB to perform ADLs   -SK      Activity Level (Endurance Goal 1, OT)  5 min activity  -SK      Time Frame (Activity Tolerance Goal 1, OT)  1 week  -SK      Progress/Outcome (Activity Tolerance Goal 1, OT)  goal ongoing  -SK        User Key  (r) = Recorded By, (t) = Taken By, (c) = Cosigned By    Initials Name Provider Type Discipline    Roseanne Cabello OT Occupational Therapist OT          Physical Therapy Education     Title: PT OT SLP Therapies (In Progress)     Topic: Physical Therapy  (In Progress)     Point: Mobility training (In Progress)     Learning Progress Summary           Patient Acceptance, E, NR by MS at 4/15/2019  4:45 PM    Nonacceptance, E,TB, NR by  at 4/12/2019  2:00 PM    Acceptance, E,D, NR by  at 4/10/2019  9:16 AM    Acceptance, E, NR by  at 4/8/2019  2:09 PM    Nonacceptance, E, NR by  at 4/6/2019  6:44 PM                   Point: Home exercise program (In Progress)     Learning Progress Summary           Patient Acceptance, E, NR by MS at 4/15/2019  4:45 PM    Nonacceptance, E,TB, NR by  at 4/12/2019  2:00 PM    Acceptance, E,D, NR by  at 4/10/2019  9:16 AM    Acceptance, E, NR by  at 4/8/2019  2:09 PM                   Point: Body mechanics (In Progress)     Learning Progress Summary           Patient Acceptance, E, NR by MS at 4/15/2019  4:45 PM    Nonacceptance, E,TB, NR by  at 4/12/2019  2:00 PM    Acceptance, E,D, NR by  at 4/10/2019  9:16 AM    Acceptance, E, NR by  at 4/8/2019  2:09 PM                   Point: Precautions (In Progress)     Learning Progress Summary           Patient Acceptance, E, NR by MS at 4/15/2019  4:45 PM    Nonacceptance, E,TB, NR by  at 4/12/2019  2:00 PM    Acceptance, E,D, NR by  at 4/10/2019  9:16 AM    Acceptance, E, NR by  at 4/8/2019  2:09 PM                               User Key     Initials Effective Dates Name Provider Type Discipline     04/03/18 -  Anastasia Fatima, PT Physical Therapist PT     03/07/18 -  Yoana Kenney, KENYA Physical Therapy Assistant PT    MS 03/04/19 -  Juanita Pang, PT Physical Therapist PT     06/09/17 -  Savanna Talbert, RN Registered Nurse Nurse                PT Recommendation and Plan  Therapy Frequency (PT Clinical Impression): 3 times/wk  Outcome Summary/Treatment Plan (PT)  Daily Summary of Progress (PT): unable to show any progress toward functional goals  Plan of Care Reviewed With: patient  Outcome Summary: Attempted to sit EOB today with maxAx2- only able to  tolerate sitting up for a few seconds due to pain level. RN aware. Will continue to advance as able.  Outcome Measures     Row Name 04/15/19 1600 04/15/19 1300          How much help from another person do you currently need...    Turning from your back to your side while in flat bed without using bedrails?  1  -MS  --     Moving from lying on back to sitting on the side of a flat bed without bedrails?  1  -MS  --     Moving to and from a bed to a chair (including a wheelchair)?  1  -MS  --     Standing up from a chair using your arms (e.g., wheelchair, bedside chair)?  1  -MS  --     Climbing 3-5 steps with a railing?  1  -MS  --     To walk in hospital room?  1  -MS  --     AM-PAC 6 Clicks Score  6  -MS  --        How much help from another is currently needed...    Putting on and taking off regular lower body clothing?  --  1  -SK     Bathing (including washing, rinsing, and drying)  --  2  -SK     Toileting (which includes using toilet bed pan or urinal)  --  1  -SK     Putting on and taking off regular upper body clothing  --  2  -SK     Taking care of personal grooming (such as brushing teeth)  --  3  -SK     Eating meals  --  3  -SK     Score  --  12  -SK        Functional Assessment    Outcome Measure Options  AM-PAC 6 Clicks Basic Mobility (PT)  -MS  AM-PAC 6 Clicks Daily Activity (OT)  -SK       User Key  (r) = Recorded By, (t) = Taken By, (c) = Cosigned By    Initials Name Provider Type    Juanita Luna, PT Physical Therapist    SK Roseanne Mello, OT Occupational Therapist         Time Calculation:   PT Charges     Row Name 04/15/19 1645 04/15/19 1636          Time Calculation    Start Time  --  1620  -MS     Stop Time  --  1636  -MS     Time Calculation (min)  --  16 min  -MS     PT Received On  --  04/15/19  -MS     PT - Next Appointment  04/17/19  -MS  04/16/19  -MS     PT Goal Re-Cert Due Date  --  04/22/19  -MS        Time Calculation- PT    Total Timed Code Minutes- PT  --  14 minute(s)  -MS        User Key  (r) = Recorded By, (t) = Taken By, (c) = Cosigned By    Initials Name Provider Type    MS Juanita Pang, PT Physical Therapist        Therapy Charges for Today     Code Description Service Date Service Provider Modifiers Qty    21390853566 HC PT THER PROC EA 15 MIN 4/15/2019 Juanita Pang, PT GP 1    46400264149 HC PT THER SUPP EA 15 MIN 4/15/2019 Juanita Pang, PT GP 1          PT G-Codes  Outcome Measure Options: AM-PAC 6 Clicks Basic Mobility (PT)  AM-PAC 6 Clicks Score: 6  Score: 12    Juanita Pang, PT  4/15/2019

## 2019-04-15 NOTE — PROGRESS NOTES
Subjective   REASON FOR CONSULTATION: Opinion regarding anticoagulation    Leg Pain      Altered Mental Status       patient is a 68-year-old male with chronic pain and peripheral neuropathy with a spinal stimulator history of seizure disorder and DVT PE during his last admission that we saw during his last admission to the hospital with some abnormal bone imaging of the spine with possible lytic lesions versus hemangioma and also DVT on 10/26/2018 with probable pulmonary embolism on a non-CT angiogram chest CT.  Patient was discharged home with Lovenox to the rehab with plans to follow-up in our office in a month which he never did-he was scheduled to have follow-up bone scans and CAT scans.  We did receive a phone call from the nursing home on 11/26/2018 asking whether he could stop his Lovenox and at that time we recommended continuing this     He is admitted at this time to the hospital with hallucinations and is no longer on Lovenox . there is a note from Dr. Catracho Yo on 12/4/2018 recommended switching to Coumadin but the patient tells me he did not want to deal with this and continue Lovenox until about February when the prescription ran out and he did not refill it because he did not realize that he had to      we are asked to consult on what to do about his anticoagulation.  His repeat Doppler shows chronic right lower extremity DVT in the gastrocnemius everything else was benign and his upper extremities are negative     CT abdomen shows extensive fecal burden in the colon with a transition point at the splenic flexure and colonoscopy is recommended     INTERVAL HISTORY:  4/9  Intermittent confusion  Creatinine 1.1  Will repeat CT T spine to evaluate lesions in T spine    4/10  Awake alert afebrile intermittent tachycardia-intermittent confusion-no bowel movement since admission  Hemoglobin is dropping steadily with dark urine?  hemolysis  CAT scan shows stable small lytic lesions in the C-spine and  T-spine etiology unclear  Discussed bone marrow biopsy the patient is agreeable we will proceed and also check for hemolysis    4/11  Feels the same today hemoglobin higher at 8.3-haptoglobin normal reticulocyte is 3.6% LDH mildly elevated doubt hemolysis  Scheduled for bone marrow biopsy today  Will follow can be discharged to nursing home before results are out-    4/12  Tachycardic but afebrile  Hard to arouse confused agitated twitching  Nonverbal  Hemoglobin stable haptoglobin normal LDH mildly elevated reticulocyte 3.6%  Bone marrow pending    4/13  More like himself today  Cannot understand intermittent delirium   Hg 8.3  Bone marrow results pending    4/14  Awake alert joking  Hemoglobin is dropping again  GI had recommended follow-up colonoscopy his last admission and I think we need to consult GI  Bone marrow biopsy pending    4/15/2019:  Patient was seen by psychiatry Dr. Maldonado he will.  He feels recurrence of delirium is most likely related to current infection and that his mentation should come to baseline once his urinary tract infection is resolved.  Patient was placed on Zyprexa for agitation due to psychosis.  GI saw patient and planning to do colonoscopy next Tuesday.  Plan to send patient to rehab on Wednesday.        Past Medical History, Past Surgical History, Social History, Family History have been reviewed and are without significant changes except as mentioned.    Review of Systems   A comprehensive 14 point review of systems was performed and was negative except as mentioned.    Medications:  The current medication list was reviewed in the EMR    ALLERGIES:  No Known Allergies    Objective      Vitals:    04/14/19 2332 04/15/19 0758 04/15/19 0830 04/15/19 1333   BP: 144/77 160/64  139/88   BP Location: Right arm Right arm  Right arm   Patient Position: Lying Lying  Lying   Pulse: 91 115  91   Resp: 20 20  20   Temp: 98.2 °F (36.8 °C) 97.8 °F (36.6 °C)  98.6 °F (37 °C)   TempSrc: Oral Oral   Oral   SpO2: 99%  95%    Weight:       Height:         No flowsheet data found.    Physical Exam  GENERAL:   Patient is alert and oriented, but gets confused on and off  NECK:  Supple with good range of motion; no thyromegaly or masses, no JVD.  LYMPHATICS:  No cervical, supraclavicular, axillary or inguinal adenopathy.  CHEST:  Lungs clear to auscultation. Good airflow.  CARDIAC:  Regular rate and rhythm without murmurs, rubs or gallops. Normal S1,S2.  ABDOMEN:  Soft, firm distended nontender with no hepatosplenomegaly or masses.  Ramey catheter in place  EXTREMITIES:  No clubbing, cyanosis or edema.  NEUROLOGICAL:  Cranial Nerves II-XII grossly intact.  No focal neurological deficits.  PSYCHIATRIC:  Normal affect and mood.               RECENT LABS:  Hematology WBC   Date Value Ref Range Status   04/15/2019 9.48 3.40 - 10.80 10*3/mm3 Final     RBC   Date Value Ref Range Status   04/15/2019 2.88 (L) 4.14 - 5.80 10*6/mm3 Final     Hemoglobin   Date Value Ref Range Status   04/15/2019 8.9 (L) 13.0 - 17.7 g/dL Final     Hematocrit   Date Value Ref Range Status   04/15/2019 29.1 (L) 37.5 - 51.0 % Final     Platelets   Date Value Ref Range Status   04/15/2019 371 140 - 450 10*3/mm3 Final     B12 folate normal-SPEP [id number] normal-PSA normal    D-dimer 2.33    Interpretation Summary 4/6/19    · Chronic right lower extremity deep vein thrombosis noted in the gastrocnemius/soleal.  · All other veins appeared normal bilaterally.  · This was a technically difficult study with somewhat limited imaging.        CT CERVICAL SPINE  IMPRESSION:  1. Multilevel degenerative and arthritic changes appear similar to  previous, no acute fracture or abnormal enhancement.  2. Stable tiny lytic lesions which again may be related to myeloma or  metastatic disease                                              CT THORACIC SPINE     IMPRESSION:  1. Multilevel degenerative changes are similar to previous with minimal  scoliosis and no  acute osseous abnormality.  2. Tiny nonspecific lytic lesions are stable from previous, there is no  abnormal enhancement.         Assessment/Plan   1. Nonspecific bone lesions on spine CT:   · Unable to obtain MRI due to spinal cord stimulator  · 10/24/18 CT cervical spine with multiple small lytic lesions (3-5 millimeters), CT thoracic spine with similar lesions, largest T2 6 millimeters, none evident on CT lumbar spine.  Lesions indeterminate in nature, benign versus malignant.  · Skeletal survey 10/25/18 with no visible lytic or blastic lesions  · CT chest abdomen pelvis 10/25/18 with suspicion of small pulmonary emboli, possible right femoral vein DVT, distention of proximal and mid:, Lobulated lesion inferior margin bladder, possibly related to prostate, redemonstration of lucent foci in thoracic spine, indeterminate.    · Labs 10/25/18 with PSA 1.05, negative serum protein electrophoresis and immunoelectrophoresis with normal quantitative immunoglobulins, normal free light chain ratio.  2. Elevated chromogranin a 10/25/18 of unclear significance (value of 8 just above normal range).  24 hour urine 5-HIAA normal   · Bone lesions of unclear significance at this time,-could be nonsecretory myeloma we will proceed with bone marrow evaluation  3. Anemia:   · B12, folic acid levels are normal.  Iron studies consistent with chronic disease.      · Stool positive for occult blood, no clinical evidence of GI blood loss however.  · Hemoglobin steadily dropping down to 8.0?  Bleeding versus hydration and dilution  ·  anticoagulation-low-dose Lovenox  · -SPEP negative  · Hemoglobin dropping steadily over the last 3 days with dark urine?  Hemolysis haptoglobin normal  · GI to see again for scope based on the recommendations in October-: Dilatation with transition point in the hepatic flexure  · GI to do colonoscopy tomorrow    3. Left lower lobe pulmonary embolism, right lower extremity femoral/calf DVT  10/25/18:   · Non-angiogram CT chest 10/25/18 with filling defect left lower lobe pulmonary artery and suspicion for femoral DVT on the right.    · Lower extremity Doppler ultrasound 10/26/18 did confirm extensive deep venous thrombosis in the right leg from the femoral vein down into the calf.   · Lower extremity Doppler with notation of right mid calf mass 2 cm of unclear significance or etiology.  Consider repeat Doppler in future.  · Patient still not very mobile spine rehab after he was hospitalized and immobilized with C. difficile colitis,   ·  on Lovenox but patient discontinued 2 months ago when he ran out of medications and repeat Doppler shows no acute etwo-Q-wdxxuy pending but he is at high risk for DVT again because of his immobility.  Coumadin is a safest oral drug for him but there is no low-dose option therefore I would recommend low-dose Lovenox prophylaxis until he is mobile again  ·   4.Renal insufficiency:  · Patient with fluctuating creatinine ranging from 0.9 up to 1.8 in the past few months  · Today, creatinine improved at 1.18     5.  .Seizure disorder  · On long-term treatment with Dilantin, follows with Dr. Yo as outpatient  · Does not want to change medication  · Interferes with xarelto/eliquis can cause Pseudolymphoma  · Intermittent confusion and possible subclinical seizure     6. Peripheral neuropathy, chronic low back pain, spinal cord stimulator revision 8/27/18     7.  Constipation  Plan     1.   Await Bone marrow biopsy results- It might help explain some of his back pain although I doubt it     2.   GI consult for dropping hemoglobin and abnormal colo on CAT scan.  Colonoscopy tomorrow    3.  Confusion which is very intermittent etiology unclear?  Seizures subclinical.  Seen by Dr. Earl, patient on Zyprexa    Carmen Godoy MD                                     4/15/2019      CC:

## 2019-04-15 NOTE — PROGRESS NOTES
Continued Stay Note  [facility]     Patient Name: Chava Stone Sr.  MRN: [id number]  Today's Date: 4/15/2019    Admit Date: 4/6/2019    Discharge Plan     Row Name 04/15/19 1222       Plan    Plan  Skilled rehab with long term care bed to follow, referrals pending, needs [facility] precert or [facility]    Patient/Family in Agreement with Plan  yes    Plan Comments  Spoke with Rachael/ [facility].  She [facility] has accepted the patient.  She will clarify if facility accepts [facility] or if she will need pre-cert.  Spoke with patient and he would like to discuss with his dtr and also speak with Rachael again about the facility before making his decision.  Spoke with Bill with [facility] and continues to follow.   for dtr to facilitate patient plan of care..........................Muna Ferguson RN        Discharge Codes    No documentation.             Muna Ferguson RN

## 2019-04-15 NOTE — PROGRESS NOTES
"DAILY PROGRESS NOTE  [facility]    Patient Identification:  Name: Chava Stone Sr.  Age: 68 y.o.  Sex: male  :  1950  MRN: [id number]         Primary Care Physician: Gordy Gupta MD      Subjective  His complaint is people not being honest with them.  He was told he was in 415 but he did not believe he was in room 415.  He has been in the same room since admission but last night he thought he had been moved.  He is a little upset about being discharged to rehab.  Denies any bleeding..  He denies any chest pain or palpitations.  No shortness of breath or cough.  No nausea or vomiting.  Objective:  General Appearance:  Comfortable, well-appearing, in no acute distress and not in pain.    Vital signs: (most recent): Blood pressure 139/88, pulse 91, temperature 98.6 °F (37 °C), temperature source Oral, resp. rate 20, height 172.7 cm (68\"), weight 116 kg (255 lb), SpO2 95 %.  No fever.    Lungs:  Normal effort and normal respiratory rate.    Heart: Normal rate.  Regular rhythm.    Abdomen: Abdomen is soft and non-distended.  There is no abdominal tenderness.     Extremities: There is no dependent edema.    Neurological: Patient is alert and oriented to person, place and time.  (   Oriented to person place and time.   with a rather peculiar affect.  Very tangential).    Skin:  Warm and dry.                Vital signs in last 24 hours:  Temp:  [97.8 °F (36.6 °C)-98.6 °F (37 °C)] 98.6 °F (37 °C)  Heart Rate:  [] 91  Resp:  [20] 20  BP: (111-160)/(64-88) 139/88    Intake/Output:    Intake/Output Summary (Last 24 hours) at 4/15/2019 1335  Last data filed at 4/15/2019 1127  Gross per 24 hour   Intake 840 ml   Output --   Net 840 ml         Results from last 7 days   Lab Units 04/15/19  0811 19  0519 19  0431 19  1516 19  0615 19  0232 04/10/19  0344 19  0337   WBC 10*3/mm3 9.48 8.13 7.30  --  8.83 9.40 10.44 13.29*   HEMOGLOBIN g/dL 8.9* 7.7* 8.3* 8.1* " 7.9* 8.3* 7.3* 8.0*   PLATELETS 10*3/mm3 371 272 259  --  264 237 201 215     Results from last 7 days   Lab Units 04/15/19  0811 04/14/19  0519 04/13/19  0431 04/12/19  0615 04/11/19  0232 04/10/19  0344 04/09/19  0337   SODIUM mmol/L 140 139 142 140 142 140 141   POTASSIUM mmol/L 4.4 4.3 4.2 4.2 4.8 4.3 3.8   CHLORIDE mmol/L 99 102 104 103 105 103 102   CO2 mmol/L 24.3 26.7 25.4 22.3 24.5 25.1 19.5*   BUN mg/dL 21 25* 26* 27* 31* 22 21   CREATININE mg/dL 1.07 0.99 1.02 1.18 1.28* 1.08 1.11   GLUCOSE mg/dL 160* 97 116* 122* 175* 108* 118*   Estimated Creatinine Clearance: 80.5 mL/min (by C-G formula based on SCr of 1.07 mg/dL).  Results from last 7 days   Lab Units 04/15/19  0811 04/14/19  0519 04/13/19  0431 04/12/19  0615 04/11/19  0232 04/10/19  0344 04/09/19  0337   CALCIUM mg/dL 9.2 8.3* 8.5* 8.6 8.9 8.8 8.9         Assessment:  Mental status changes - off and on.  Metabolic encephalopathy, consider psychosis.  Neurology and psychiatry of both signed off.     Anemia  -S/p bone marrow bx.  Path pending.   -Trans 1 unit 4/10/19  -Hematology following  -Colonoscopy tomorrow     Chronic pain  -Oral pain medication as needed  -He has a pain pump     H/o PE- and had clot in 10/2018  -presently on ppx doses of Lovenox.   -No blood clots on lower extremity Doppler  -+ D-dimer  -Patient cannot take Eliquis or Xarelto because of Dilantin  -off AC since he's had 5 months treatment     Hxo Seizures disorder -Dilantin  -no seizures in 15 years ago  -Dilantin level is okay, no nystagmus on exam     Hypothyroidism  - continue Synthroid     Acute renal failure  -Resolved.     Urinary retention -probable neurogenic bladder: Urology input appreciated.  Ramey catheter presently out.      UTI status post Ramey catheter: Patient not a reliable historian concerning symptoms.  I will go ahead and treat with oral antibiotics.     Nonspecific bone lesions on spine CT:     Discussed with nursing staff.    Disposition: Likely Wednesday  to subacute rehab    Quang Rodgers MD  4/15/2019  1:35 PM

## 2019-04-15 NOTE — PROGRESS NOTES
[facility]  Inpatient Progress Note    Reason for Follow Up:  Anemia, heme positive stool    Subjective     Interval History:   No issues overnight - denies abd pain - no BM- rcvd mag citrate this am    Current Facility-Administered Medications:   •  acetaminophen (TYLENOL) tablet 650 mg, 650 mg, Oral, Q4H PRN, Jus Barreto MD, 650 mg at 04/10/19 2013  •  aspirin tablet 650 mg, 650 mg, Oral, Daily, Jus Barreto MD, 650 mg at 04/15/19 0920  •  bisacodyl (DULCOLAX) suppository 10 mg, 10 mg, Rectal, Daily PRN, Criss Leone MD, 10 mg at 04/10/19 1130  •  cefdinir (OMNICEF) capsule 300 mg, 300 mg, Oral, Q12H, Marshall Hackett MD, 300 mg at 04/15/19 0920  •  DULoxetine (CYMBALTA) DR capsule 120 mg, 120 mg, Oral, Nightly, Jus Barreto MD, 120 mg at 04/14/19 2050  •  enoxaparin (LOVENOX) syringe 40 mg, 40 mg, Subcutaneous, Q24H, Jus Barreto MD, 40 mg at 04/15/19 0131  •  finasteride (PROSCAR) tablet 5 mg, 5 mg, Oral, Daily, Jus Barreto MD, 5 mg at 04/15/19 0920  •  folic acid (FOLVITE) tablet 1 mg, 1 mg, Oral, Daily, Criss Leone MD, 1 mg at 04/15/19 0920  •  HYDROcodone-acetaminophen (NORCO) 5-325 MG per tablet 1 tablet, 1 tablet, Oral, Q4H PRN, Jus Barreto MD, 1 tablet at 04/14/19 2050  •  hydrophor (AQUAPHOR) ointment, , Topical, Q12H, Jus Barreto MD, 1 application at 04/15/19 0925  •  levothyroxine (SYNTHROID, LEVOTHROID) tablet 88 mcg, 88 mcg, Oral, Q AM, Jus Barreto MD, 88 mcg at 04/15/19 0639  •  OLANZapine (zyPREXA) injection 10 mg, 10 mg, Intramuscular, Q8H PRN, Abhay Dudley III, MD, 10 mg at 04/12/19 0522  •  phenytoin (DILANTIN) ER capsule 500 mg, 500 mg, Oral, Nightly, EdlingJus MD, 500 mg at 04/15/19 0130  •  polyethylene glycol 3350 powder (bulk), 119 g, Oral, Q6H, Nithin, Jus Rousseau MD  •  sodium chloride 0.9 % flush 1-10 mL, 1-10 mL, Intravenous, PRN, Marshall Hackett MD  •  [COMPLETED]  Insert peripheral IV, , , Once **AND** sodium chloride 0.9 % flush 10 mL, 10 mL, Intravenous, PRN, Tiara Meraz APRN  •  sodium chloride 0.9 % flush 3 mL, 3 mL, Intravenous, Q12H, Jus Barreto MD, 3 mL at 04/14/19 0835  •  sodium chloride 0.9 % flush 3 mL, 3 mL, Intravenous, Q12H, Marshall Hackett MD, 3 mL at 04/15/19 0924  •  sodium chloride 0.9 % flush 3-10 mL, 3-10 mL, Intravenous, PRN, Jus Barreto MD  •  thiamine (VITAMIN B-1) tablet 100 mg, 100 mg, Oral, Daily, Jus Barreto MD, 100 mg at 04/15/19 0920  Review of Systems:    The following systems were reviewed and negative;  respiratory, cardiovascular and gastrointestinal    Objective     Vital Signs  Temp:  [97.8 °F (36.6 °C)-98.5 °F (36.9 °C)] 97.8 °F (36.6 °C)  Heart Rate:  [] 115  Resp:  [20] 20  BP: (111-160)/(64-78) 160/64  Body mass index is 38.77 kg/m².    Intake/Output Summary (Last 24 hours) at 4/15/2019 0931  Last data filed at 4/15/2019 0133  Gross per 24 hour   Intake 590 ml   Output 550 ml   Net 40 ml     No intake/output data recorded.     Physical Exam:   General: patient awake, alert and cooperative   Eyes: Normal lids and lashes, no scleral icterus   Neck: supple, normal ROM   Skin: warm and dry, not jaundiced   Abdomen: soft, nontender, nondistended   Psychiatric: Normal mood and behavior; memory intact     Results Review:     I reviewed the patient's new clinical results.    Results from last 7 days   Lab Units 04/15/19  0811 04/14/19  0519 04/13/19  0431   WBC 10*3/mm3 9.48 8.13 7.30   HEMOGLOBIN g/dL 8.9* 7.7* 8.3*   HEMATOCRIT % 29.1* 25.8* 28.0*   PLATELETS 10*3/mm3 371 272 259     Results from last 7 days   Lab Units 04/15/19  0811 04/14/19  0519 04/13/19  0431   SODIUM mmol/L 140 139 142   POTASSIUM mmol/L 4.4 4.3 4.2   CHLORIDE mmol/L 99 102 104   CO2 mmol/L 24.3 26.7 25.4   BUN mg/dL 21 25* 26*   CREATININE mg/dL 1.07 0.99 1.02   CALCIUM mg/dL 9.2 8.3* 8.5*   GLUCOSE mg/dL 160* 97 116*      Results from last 7 days   Lab Units 04/11/19  0232   INR  1.03     Lab Results   Lab Value Date/Time    LIPASE 51 10/21/2018 1238       Radiology:  CT Cervical Spine With Contrast   Final Result   1. Multilevel degenerative and arthritic changes appear similar to   previous, no acute fracture or abnormal enhancement.   2. Stable tiny lytic lesions which again may be related to myeloma or   metastatic disease.       This report was finalized on 4/14/2019 10:21 PM by Rashid Leach M.D.          CT Cervical Spine Without Contrast   Final Result   1. Multilevel degenerative and arthritic changes appear similar to   previous, no acute fracture or abnormal enhancement.   2. Stable tiny lytic lesions which again may be related to myeloma or   metastatic disease.       This report was finalized on 4/14/2019 10:21 PM by Rashid Leach M.D.          CT Thoracic Spine With & Without Contrast   Final Result   1. Multilevel degenerative changes are similar to previous with minimal   scoliosis and no acute osseous abnormality.   2. Tiny nonspecific lytic lesions are stable from previous, there is no   abnormal enhancement.       This report was finalized on 4/12/2019 5:36 AM by Rashid Leach M.D.          CT Guided Biopsy Bone Marrow   Final Result   Successful CT guided bone marrow aspirate and bone biopsy.        Radiation dose reduction techniques were utilized, including automated   exposure control and exposure modulation based on body size.           This report was finalized on 4/11/2019 4:25 PM by Dr. Demetrius Cali MD.          CT Abdomen Pelvis Without Contrast   Final Result       1. No hydronephrosis seen on either side. Ramey catheter is present   within the urinary bladder. Small amount of residual urine is seen   within the bladder.   2. The patient has extensive fecal burden throughout the proximal colon,   with gaseous distention of the transverse colon again noted. Apparent   transition point is  located at the splenic flexure. I see no obvious   obstructing lesion, but the patient had a similar configuration on prior   exam from October 2018. Further evaluation with colonoscopy could be   considered to evaluate for any underlying lesion, particularly if the   patient has any bowel symptoms The distal colon appears relatively   decompressed.       Radiation dose reduction techniques were utilized, including automated   exposure control and exposure modulation based on body size.       This report was finalized on 4/7/2019 11:06 PM by Dr. Sandra Mcwilliams M.D.          XR Chest 2 View   Final Result   1. Underinflation of the lungs.   2. Mild cardiomegaly.   3. No acute process identified. Chest appears unchanged from the   10/21/2018 study.       This report was finalized on 4/6/2019 2:15 PM by Dr. Frank Lebron M.D.          CT Head Without Contrast   Final Result          Assessment/Plan     Patient Active Problem List   Diagnosis   • Spinal stenosis of lumbar region   • Lumbar radiculopathy   • Spinal cord stimulator status   • Pain in both lower extremities   • Chronic pain   • Postlaminectomy syndrome of lumbar region   • Benign essential hypertension   • Colon polyp   • Diverticulosis of large intestine   • Elevated prostate specific antigen (PSA)   • Psychomotor epilepsy (CMS/HCC)   • Hyperprolactinemia (CMS/HCC)   • Hypogonadism in male   • Acquired hypothyroidism   • Malignant neoplasm of prostate (CMS/HCC)   • Solitary pulmonary nodule   • Vitamin D deficiency   • Battery end of life of spinal cord stimulator   • Weakness   • Peripheral neuropathy   • Diarrhea   • C. difficile colitis   • Pulmonary embolus (CMS/HCC)   • Acute deep vein thrombosis (DVT) of distal end of right lower extremity (CMS/HCC)   • Hallucinations   • Hx pulmonary embolism   • Seizure disorder (CMS/HCC)   • Leukocytosis     Assessment:  1. Anemia, heme positive stool  2. H/o c diff  3. Confusion    Plan:  - h/h  stable  - plan for c/s tommorrow    I discussed the patients findings and my recommendations with patient.    Antonette Mar MD

## 2019-04-15 NOTE — PLAN OF CARE
Problem: Patient Care Overview  Goal: Plan of Care Review   04/15/19 8293   Coping/Psychosocial   Plan of Care Reviewed With patient   Plan of Care Review   Progress no change   OTHER   Outcome Summary pt A+Ox4 today, remains on room air, participated with PT and OT, currently receiving bowel prep plan for colonoscopy in am     Goal: Discharge Needs Assessment  Outcome: Ongoing (interventions implemented as appropriate)    Goal: Interprofessional Rounds/Family Conf  Outcome: Ongoing (interventions implemented as appropriate)      Problem: Fall Risk (Adult)  Goal: Absence of Fall  Outcome: Ongoing (interventions implemented as appropriate)      Problem: Skin Injury Risk (Adult)  Goal: Skin Health and Integrity  Outcome: Ongoing (interventions implemented as appropriate)      Problem: Mobility, Physical Impaired (Adult)  Goal: Enhanced Mobility Skills  Outcome: Ongoing (interventions implemented as appropriate)    Goal: Enhanced Functional Ability  Outcome: Ongoing (interventions implemented as appropriate)

## 2019-04-15 NOTE — PLAN OF CARE
Problem: Patient Care Overview  Goal: Plan of Care Review  Outcome: Ongoing (interventions implemented as appropriate)   04/15/19 0518   Coping/Psychosocial   Plan of Care Reviewed With patient   Plan of Care Review   Progress no change   OTHER   Outcome Summary No significant changes. Pt on PO ABX for UTI. VSS. afebrile. Confused intermittently this am. Frustrated. Bowel prep starting tonight.      Goal: Individualization and Mutuality  Outcome: Ongoing (interventions implemented as appropriate)    Goal: Discharge Needs Assessment  Outcome: Ongoing (interventions implemented as appropriate)    Goal: Interprofessional Rounds/Family Conf  Outcome: Ongoing (interventions implemented as appropriate)      Problem: Fall Risk (Adult)  Goal: Absence of Fall  Outcome: Ongoing (interventions implemented as appropriate)      Problem: Skin Injury Risk (Adult)  Goal: Skin Health and Integrity  Outcome: Ongoing (interventions implemented as appropriate)      Problem: Mobility, Physical Impaired (Adult)  Goal: Enhanced Mobility Skills  Outcome: Ongoing (interventions implemented as appropriate)    Goal: Enhanced Functional Ability  Outcome: Ongoing (interventions implemented as appropriate)

## 2019-04-15 NOTE — THERAPY EVALUATION
Acute Care - Occupational Therapy Initial Evaluation  [facility]     Patient Name: Chava Stone Sr.  : 1950  MRN: [id number]  Today's Date: 4/15/2019  Onset of Illness/Injury or Date of Surgery: 19  Date of Referral to OT: 19  Referring Physician: Dr Hackett     Admit Date: 2019       ICD-10-CM ICD-9-CM   1. Hallucinations R44.3 780.1   2. General weakness R53.1 780.79   3. Leg pain, bilateral M79.604 729.5    M79.605    4. Impaired mobility Z74.09 799.89   5. Malignant neoplasm of prostate (CMS/HCC) C61 185   6. Heme positive stool R19.5 792.1     Patient Active Problem List   Diagnosis   • Spinal stenosis of lumbar region   • Lumbar radiculopathy   • Spinal cord stimulator status   • Pain in both lower extremities   • Chronic pain   • Postlaminectomy syndrome of lumbar region   • Benign essential hypertension   • Colon polyp   • Diverticulosis of large intestine   • Elevated prostate specific antigen (PSA)   • Psychomotor epilepsy (CMS/HCC)   • Hyperprolactinemia (CMS/HCC)   • Hypogonadism in male   • Acquired hypothyroidism   • Malignant neoplasm of prostate (CMS/HCC)   • Solitary pulmonary nodule   • Vitamin D deficiency   • Battery end of life of spinal cord stimulator   • Weakness   • Peripheral neuropathy   • Diarrhea   • C. difficile colitis   • Pulmonary embolus (CMS/HCC)   • Acute deep vein thrombosis (DVT) of distal end of right lower extremity (CMS/HCC)   • Hallucinations   • Hx pulmonary embolism   • Seizure disorder (CMS/HCC)   • Leukocytosis   • Heme positive stool     Past Medical History:   Diagnosis Date   • Anemia    • Arthritis    • At risk for sleep apnea     STOP BANG 5   • Chronic pain    • Diverticulosis of sigmoid colon 2011   • DVT (deep venous thrombosis) (CMS/HCC)    • Epilepsy (CMS/HCC)     Since childhood   • History of solitary pulmonary nodule    • History of vitamin D deficiency    • Hyperprolactinemia (CMS/HCC)    • Hypertension    •  "Hypogonadism in male    • Hypothyroidism    • Injury of back    • Leg pain    • Low back pain     DDD   • Lumbar canal stenosis    • Lumbar radiculopathy    • Lytic bone lesions on xray 2018   • Peripheral neuropathy    • Pulmonary embolism (CMS/HCC)      Past Surgical History:   Procedure Laterality Date   • COLONOSCOPY      1 polyp a 18 cm proximal to anus; sigmoid diverticulosis   • EPIDURAL BLOCK     • HAND SURGERY Right    • PROSTATE SURGERY      Biopsy, benign   • SPINAL CORD STIMULATOR IMPLANT     • SPINAL CORD STIMULATOR IMPLANT N/A 2018    Procedure: [place] [facility]- Spinal Cord Stimulator revision--battery ;  Surgeon: Ced Castro MD;  Location: [facility] MAIN OR;  Service: Pain Management   • THYROID BIOPSY      using Intraspinal Neurostimulator          OT ASSESSMENT FLOWSHEET (last 12 hours)      Occupational Therapy Evaluation     Row Name 04/15/19 1200                   OT Evaluation Time/Intention    Subjective Information  complains of;pain  -SK        Document Type  evaluation  -SK        Mode of Treatment  occupational therapy  -SK        Patient Effort  adequate  -SK        Symptoms Noted During/After Treatment  increased pain  -SK           General Information    Patient Profile Reviewed?  yes  -SK        Onset of Illness/Injury or Date of Surgery  19  -SK        Referring Physician  Dr Hackett   -SK        Patient Observations  alert;cooperative;agree to therapy  -SK        Patient/Family Observations  \"I need to go to room 415\"   -SK        General Observations of Patient  Pt supine in bed, no signs of distress   -SK        Prior Level of Function  w/c or scooter pt w/c level with lift for transfers, assist with ADLs from   -SK        Equipment Currently Used at Home  wheelchair;lift device;shower chair  -SK        Existing Precautions/Restrictions  fall  -SK        Risks Reviewed  patient:  -SK        Benefits Reviewed  patient:  -SK        Barriers to Rehab  none identified "  -SK           Cognitive Assessment/Intervention- PT/OT    Orientation Status (Cognition)  oriented to;person;unable/difficult to assess  -SK        Follows Commands (Cognition)  follows one step commands;25-49% accuracy  -SK        Safety Deficit (Cognitive)  judgment;problem solving;safety precautions follow-through/compliance;safety precautions awareness;moderate deficit  -SK           Safety Issues, Functional Mobility    Safety Issues Affecting Function (Mobility)  problem solving;ability to follow commands;safety precautions follow-through/compliance;safety precaution awareness  -SK        Impairments Affecting Function (Mobility)  cognition;balance;pain  -SK        Comment, Safety Issues/Impairments (Mobility)  Pt not a good historian, confused at times   -SK           Bed Mobility Assessment/Treatment    Bed Mobility Assessment/Treatment  supine-sit;sit-supine;rolling left;rolling right  -SK        Rolling Left [place] (Bed Mobility)  maximum assist (25% patient effort)  -SK        Rolling Right [place] (Bed Mobility)  maximum assist (25% patient effort)  -SK           ADL Assessment/Intervention    BADL Assessment/Intervention  bathing;upper body dressing;lower body dressing;grooming;feeding;toileting  -SK           Upper Body Dressing Assessment/Training    Upper Body Dressing [place] Level  doff;don;front opening garment;minimum assist (75% patient effort)  -SK        Upper Body Dressing Position  supported sitting  -SK           Lower Body Dressing Assessment/Training    Lower Body Dressing [place] Level  dependent (less than 25% patient effort)  -SK           Grooming Assessment/Training    [place] Level (Grooming)  verbal cues;set up;wash face, hands  -SK        Grooming Position  supported sitting  -SK           Toileting Assessment/Training    [place] Level (Toileting)  dependent (less than 25% patient effort)  -SK           BADL Safety/Performance    Skilled BADL  Treatment/Intervention  BADL process/adaptation training;cognitive/safety deficit modifications  -SK           MMT (Manual Muscle Testing)    General MMT Comments  2+/3/5  -SK           Motor Assessment/Interventions    Additional Documentation  Balance (Group);Balance Interventions (Group);Therapeutic Exercise (Group);Therapeutic Exercise Interventions (Group)  -SK           Positioning and Restraints    Pre-Treatment Position  in bed  -SK        Post Treatment Position  bed  -SK        In Bed  call light within reach;encouraged to call for assist;exit alarm on;with nsg  -SK           Pain Scale: Numbers Pre/Post-Treatment    Pain Location - Orientation  lower  -SK        Pain Location  back  -SK           Pain Scale: Word Pre/Post-Treatment    Pain: Word Scale, Pretreatment  4 - moderate pain  -SK        Pain: Word Scale, Post-Treatment  6 - moderate-severe pain  -SK           Clinical Impression (OT)    Date of Referral to OT  04/11/19  -SK        OT Diagnosis  Generalized weakness, admitted with hallucinations  -SK        Functional Level at Time of Evaluation (OT Eval)  Pt was w/c bound, lift at home, assist with all ADLs   -SK        Patient/Family Goals Statement (OT Eval)  To go back home with son   -SK        Criteria for Skilled Therapeutic Interventions Met (OT Eval)  yes  -SK        Rehab Potential (OT Eval)  fair, will monitor progress closely  -SK        Therapy Frequency (OT Eval)  5 times/wk  -SK        Care Plan Review (OT)  evaluation/treatment results reviewed;care plan/treatment goals reviewed;patient/other agree to care plan  -SK        Anticipated Discharge Disposition (OT)  skilled nursing facility  -SK           Planned OT Interventions    Planned Therapy Interventions (OT Eval)  activity tolerance training;transfer/mobility retraining;BADL retraining;functional balance retraining  -SK           OT Goals    Bathing Goal Selection (OT)  bathing, OT goal 1  -SK        Grooming Goal Selection  (OT)  grooming, OT goal 1  -SK        Activity Tolerance Goal Selection (OT)  activity tolerance, OT goal 1  -SK        Additional Documentation  Activity Tolerance Goal Selection (OT) (Row);Grooming Goal Selection (OT) (Row)  -SK           Bathing Goal 1 (OT)    Activity/Assistive Device (Bathing Goal 1, OT)  upper body bathing;lower body bathing;long-handled sponge  -SK        [place] Level/Cues Needed (Bathing Goal 1, OT)  moderate assist (50-74% patient effort)  -SK        Time Frame (Bathing Goal 1, OT)  1 week  -SK        Progress/Outcomes (Bathing Goal 1, OT)  goal ongoing  -SK           Grooming Goal 1 (OT)    Activity/Device (Grooming Goal 1, OT)  oral care  -SK        [place] (Grooming Goal 1, OT)  set-up required  -SK        Time Frame (Grooming Goal 1, OT)  1 week  -SK        Progress/Outcome (Grooming Goal 1, OT)  goal ongoing  -SK            Activity Tolerance Goal 1 (OT)    Activity Tolerance Goal 1 (OT)  Pt tolerate sitting EOB to perform ADLs   -SK        Activity Level (Endurance Goal 1, OT)  5 min activity  -SK        Time Frame (Activity Tolerance Goal 1, OT)  1 week  -SK        Progress/Outcome (Activity Tolerance Goal 1, OT)  goal ongoing  -SK          User Key  (r) = Recorded By, (t) = Taken By, (c) = Cosigned By    Initials Name Effective Dates    Roseanne Cabello, OT 02/25/19 -          Occupational Therapy Education     Title: PT OT SLP Therapies (In Progress)     Topic: Occupational Therapy (In Progress)     Point: ADL training (Done)     Description: Instruct learner(s) on proper safety adaptation and remediation techniques during self care or transfers.   Instruct in proper use of assistive devices.    Learning Progress Summary           Patient Acceptance, E, VU by SK at 4/15/2019  1:25 PM    Comment:  discussed positioning to help with Ind with adls                   Point: Precautions (Done)     Description: Instruct learner(s) on prescribed precautions during self-care and  functional transfers.    Learning Progress Summary           Patient Acceptance, E, VU by SK at 4/15/2019  1:25 PM    Comment:  discussed positioning to help with Ind with adls                               User Key     Initials Effective Dates Name Provider Type Discipline    SK 02/25/19 -  Roseanne Mello OT Occupational Therapist OT                  OT Recommendation and Plan  Outcome Summary/Treatment Plan (OT)  Anticipated Discharge Disposition (OT): skilled nursing facility  Planned Therapy Interventions (OT Eval): activity tolerance training, transfer/mobility retraining, BADL retraining, functional balance retraining  Therapy Frequency (OT Eval): 5 times/wk  Plan of Care Review  Plan of Care Reviewed With: patient  Plan of Care Reviewed With: patient  Outcome Summary: pt admitted to [facility] with hallucinations, pt is poor historian this date, was w/c bound prior to admittion,  Pt demo generalized weakness, decrease strength, endurance and cognition.  Pt Max A with bed mobility, dep with toileting, Mod A with bathing from bed level, Min a to don gown.  Pt benefit from skilled OT to address deficits and recommend additional therapy at SNF before returning home.     Outcome Measures     Row Name 04/15/19 1300 04/12/19 1400          How much help from another person do you currently need...    Turning from your back to your side while in flat bed without using bedrails?  --  1  -LH     Moving from lying on back to sitting on the side of a flat bed without bedrails?  --  1  -LH     Moving to and from a bed to a chair (including a wheelchair)?  --  1  -LH     Standing up from a chair using your arms (e.g., wheelchair, bedside chair)?  --  1  -LH     Climbing 3-5 steps with a railing?  --  1  -LH     To walk in hospital room?  --  1  -LH     AM-PAC 6 Clicks Score  --  6  -LH        How much help from another is currently needed...    Putting on and taking off regular lower body clothing?  1  -SK  --     Bathing  (including washing, rinsing, and drying)  2  -SK  --     Toileting (which includes using toilet bed pan or urinal)  1  -SK  --     Putting on and taking off regular upper body clothing  2  -SK  --     Taking care of personal grooming (such as brushing teeth)  3  -SK  --     Eating meals  3  -SK  --     Score  12  -SK  --        Functional Assessment    Outcome Measure Options  AM-PAC 6 Clicks Daily Activity (OT)  -SK  AM-PAC 6 Clicks Basic Mobility (PT)  -       User Key  (r) = Recorded By, (t) = Taken By, (c) = Cosigned By    Initials Name Provider Type     Anastasia Fatima, PT Physical Therapist    SK Roseanne Mello OT Occupational Therapist          Time Calculation:   Time Calculation- OT     Row Name 04/15/19 1327             Time Calculation- OT    OT Start Time  1042  -SK      OT Stop Time  1121  -SK      OT Time Calculation (min)  39 min  -SK      Total Timed Code Minutes- OT  25 minute(s)  -SK        User Key  (r) = Recorded By, (t) = Taken By, (c) = Cosigned By    Initials Name Provider Type    SK Roseanne Mello OT Occupational Therapist        Therapy Charges for Today     Code Description Service Date Service Provider Modifiers Qty    [id number]  OT EVAL LOW COMPLEXITY 2 4/15/2019 Roseanne Mello OT GO 1    [id number]  OT SELF CARE/MGMT/TRAIN EA 15 MIN 4/15/2019 Roseanne Mello OT GO 2               Roseanne Mello OT  4/15/2019

## 2019-04-15 NOTE — PLAN OF CARE
Problem: Patient Care Overview  Goal: Plan of Care Review   04/15/19 4733   Coping/Psychosocial   Plan of Care Reviewed With patient   OTHER   Outcome Summary pt admitted to [facility] with hallucinations, pt is poor historian this date, was w/c bound prior to admittion, Pt demo generalized weakness, decrease strength, endurance and cognition. Pt Max A with bed mobility, dep with toileting, Mod A with bathing from bed level, Min a to don gown. Pt benefit from skilled OT to address deficits and recommend additional therapy at SNF before returning home.

## 2019-04-16 NOTE — ANESTHESIA PREPROCEDURE EVALUATION
Anesthesia Evaluation     Patient summary reviewed   NPO Solid Status: > 8 hours             Airway   Mallampati: II  TM distance: >3 FB  Dental      Pulmonary    (+) pulmonary embolism,   Cardiovascular     Rhythm: regular  Rate: normal    (+) hypertension, PVD, DVT,       Neuro/Psych  (+) seizures,     GI/Hepatic/Renal/Endo    (+) obesity,   hypothyroidism,     Musculoskeletal     Abdominal    Substance History      OB/GYN          Other   (+) arthritis                     Anesthesia Plan    ASA 3     MAC   total IV anesthesia  Anesthetic plan, all risks, benefits, and alternatives have been provided, discussed and informed consent has been obtained with: patient.

## 2019-04-16 NOTE — PROGRESS NOTES
Subjective   REASON FOR CONSULTATION: Opinion regarding anticoagulation     Leg Pain      Altered Mental Status       patient is a 68-year-old male with chronic pain and peripheral neuropathy with a spinal stimulator history of seizure disorder and DVT PE during his last admission that we saw during his last admission to the hospital with some abnormal bone imaging of the spine with possible lytic lesions versus hemangioma and also DVT on 10/26/2018 with probable pulmonary embolism on a non-CT angiogram chest CT.  Patient was discharged home with Lovenox to the rehab with plans to follow-up in our office in a month which he never did-he was scheduled to have follow-up bone scans and CAT scans.  We did receive a phone call from the nursing home on 11/26/2018 asking whether he could stop his Lovenox and at that time we recommended continuing this     He is admitted at this time to the hospital with hallucinations and is no longer on Lovenox . there is a note from Dr. Catracho Yo on 12/4/2018 recommended switching to Coumadin but the patient tells me he did not want to deal with this and continue Lovenox until about February when the prescription ran out and he did not refill it because he did not realize that he had to      we are asked to consult on what to do about his anticoagulation.  His repeat Doppler shows chronic right lower extremity DVT in the gastrocnemius everything else was benign and his upper extremities are negative     CT abdomen shows extensive fecal burden in the colon with a transition point at the splenic flexure and colonoscopy is recommended     INTERVAL HISTORY:  4/9  Intermittent confusion  Creatinine 1.1  Will repeat CT T spine to evaluate lesions in T spine    4/10  Awake alert afebrile intermittent tachycardia-intermittent confusion-no bowel movement since admission  Hemoglobin is dropping steadily with dark urine?  hemolysis  CAT scan shows stable small lytic lesions in the C-spine and  T-spine etiology unclear  Discussed bone marrow biopsy the patient is agreeable we will proceed and also check for hemolysis    4/11  Feels the same today hemoglobin higher at 8.3-haptoglobin normal reticulocyte is 3.6% LDH mildly elevated doubt hemolysis  Scheduled for bone marrow biopsy today  Will follow can be discharged to nursing home before results are out-    4/12  Tachycardic but afebrile  Hard to arouse confused agitated twitching  Nonverbal  Hemoglobin stable haptoglobin normal LDH mildly elevated reticulocyte 3.6%  Bone marrow pending    4/13  More like himself today  Cannot understand intermittent delirium   Hg 8.3  Bone marrow results pending    4/14  Awake alert joking  Hemoglobin is dropping again  GI had recommended follow-up colonoscopy his last admission and I think we need to consult GI  Bone marrow biopsy pending    4/15/2019:  Patient was seen by psychiatry Dr. Maldonado he will.  He feels recurrence of delirium is most likely related to current infection and that his mentation should come to baseline once his urinary tract infection is resolved.  Patient was placed on Zyprexa for agitation due to psychosis.  GI saw patient and planning to do colonoscopy next Tuesday.  Plan to send patient to rehab on Wednesday.    4/16/2019: Patient currently is on Zyprexa for agitation.  Psychiatry felt that the delirium was secondary to the urinary tract infection.  His hemoglobin is 8.5.  His white count and platelet count are normal.  Patient's urine culture showed greater than 100,000 E. coli.  MYNOR negative  Hemoglobin is stable from 7.7-8.9-8.5.  White count and platelet count are normal.  , Urine culture bone marrow shows maturing trilineage hematopoiesis without evidence of lymphoma, metastatic carcinoma or blast cells.  Scattered plasma cells are identified and mildly increased protein staining.  The clot section also shows scattered plasma cells are identified but do not appear increased by routine  staining.  Marrow has been sent to [facility]      Past Medical History, Past Surgical History, Social History, Family History have been reviewed and are without significant changes except as mentioned.    Review of Systems   A comprehensive 14 point review of systems was performed and was negative except as mentioned.    Medications:  The current medication list was reviewed in the EMR    ALLERGIES:  No Known Allergies    Objective      Vitals:    04/15/19 1430 04/15/19 1900 04/15/19 2300 04/16/19 0731   BP:  140/85 105/88 152/83   BP Location:  Left arm Right arm Right arm   Patient Position:  Lying Lying Lying   Pulse:  93 88 82   Resp:  18 20 18   Temp:  98 °F (36.7 °C) 98.3 °F (36.8 °C) 97.8 °F (36.6 °C)   TempSrc:  Oral Oral Oral   SpO2: 95%  97% 99%   Weight:       Height:         No flowsheet data found.    Physical Exam  GENERAL:   Patient is alert and oriented, but gets confused on and off  NECK:  Supple with good range of motion; no thyromegaly or masses, no JVD.  LYMPHATICS:  No cervical, supraclavicular, axillary or inguinal adenopathy.  CHEST:  Lungs clear to auscultation. Good airflow.  CARDIAC:  Regular rate and rhythm without murmurs, rubs or gallops. Normal S1,S2.  ABDOMEN:  Soft, firm distended nontender with no hepatosplenomegaly or masses.  Ramey catheter in place  EXTREMITIES:  No clubbing, cyanosis or edema.  NEUROLOGICAL:  Cranial Nerves II-XII grossly intact.  No focal neurological deficits.  PSYCHIATRIC:  Normal affect and mood.               RECENT LABS:  Hematology WBC   Date Value Ref Range Status   04/16/2019 7.39 3.40 - 10.80 10*3/mm3 Final     RBC   Date Value Ref Range Status   04/16/2019 2.74 (L) 4.14 - 5.80 10*6/mm3 Final     Hemoglobin   Date Value Ref Range Status   04/16/2019 8.5 (L) 13.0 - 17.7 g/dL Final     Hematocrit   Date Value Ref Range Status   04/16/2019 27.1 (L) 37.5 - 51.0 % Final     Platelets   Date Value Ref Range Status   04/16/2019 364 140 - 450 10*3/mm3 Final      B12 folate normal-SPEP [id number]-6-FXSD normal-PSA normal    D-dimer 2.33    Interpretation Summary 4/6/19    · Chronic right lower extremity deep vein thrombosis noted in the gastrocnemius/soleal.  · All other veins appeared normal bilaterally.  · This was a technically difficult study with somewhat limited imaging.        CT CERVICAL SPINE  IMPRESSION:  1. Multilevel degenerative and arthritic changes appear similar to  previous, no acute fracture or abnormal enhancement.  2. Stable tiny lytic lesions which again may be related to myeloma or  metastatic disease                                              CT THORACIC SPINE     IMPRESSION:  1. Multilevel degenerative changes are similar to previous with minimal  scoliosis and no acute osseous abnormality.  2. Tiny nonspecific lytic lesions are stable from previous, there is no  abnormal enhancement.         Assessment/Plan   1. Nonspecific bone lesions on spine CT:   · Unable to obtain MRI due to spinal cord stimulator  · 10/24/18 CT cervical spine with multiple small lytic lesions (3-5 millimeters), CT thoracic spine with similar lesions, largest T2 6 millimeters, none evident on CT lumbar spine.  Lesions indeterminate in nature, benign versus malignant.  · Skeletal survey 10/25/18 with no visible lytic or blastic lesions  · CT chest abdomen pelvis 10/25/18 with suspicion of small pulmonary emboli, possible right femoral vein DVT, distention of proximal and mid:, Lobulated lesion inferior margin bladder, possibly related to prostate, redemonstration of lucent foci in thoracic spine, indeterminate.    · Labs 10/25/18 with PSA 1.05, negative serum protein electrophoresis and immunoelectrophoresis with normal quantitative immunoglobulins, normal free light chain ratio.  · Bone marrow reviewed, no evidence of leukemia or lymphoma.  Plasma cells are scattered, mildly increased.  It has been sent to [facility].  2. Elevated chromogranin a 10/25/18 of unclear  significance (value of 8 just above normal range).  24 hour urine 5-HIAA normal   · Bone lesions of unclear significance at this time,-could be nonsecretory myeloma we will proceed with bone marrow evaluation  3. Anemia:   · B12, folic acid levels are normal.  Iron studies consistent with chronic disease.      · Stool positive for occult blood, no clinical evidence of GI blood loss however.  · Hemoglobin steadily dropping down to 8.0?  Bleeding versus hydration and dilution  ·  anticoagulation-low-dose Lovenox  · -SPEP negative  · Hemoglobin dropping steadily over the last 3 days with dark urine?  Hemolysis haptoglobin normal  · GI to see again for scope based on the recommendations in October-: Dilatation with transition point in the hepatic flexure  · GI to do colonoscopy tomorrow  · Colonoscopy showed nonbleeding internal hemorrhoids.  Grade 2.  The lumen of the sigmoid colon descending colon and transverse colon were significantly dilated.    3. Left lower lobe pulmonary embolism, right lower extremity femoral/calf DVT 10/25/18:   · Non-angiogram CT chest 10/25/18 with filling defect left lower lobe pulmonary artery and suspicion for femoral DVT on the right.    · Lower extremity Doppler ultrasound 10/26/18 did confirm extensive deep venous thrombosis in the right leg from the femoral vein down into the calf.   · Lower extremity Doppler with notation of right mid calf mass 2 cm of unclear significance or etiology.  Consider repeat Doppler in future.  · Patient still not very mobile spine rehab after he was hospitalized and immobilized with C. difficile colitis,   ·  on Lovenox but patient discontinued 2 months ago when he ran out of medications and repeat Doppler shows no acute polt-M-hyxken pending but he is at high risk for DVT again because of his immobility.  Coumadin is a safest oral drug for him but there is no low-dose option therefore I would recommend low-dose Lovenox prophylaxis until he is mobile  again  ·   4.Renal insufficiency:  · Patient with fluctuating creatinine ranging from 0.9 up to 1.8 in the past few months  · Today, creatinine improved at 1.18     5.  .Seizure disorder  · On long-term treatment with Dilantin, follows with Dr. oY as outpatient  · Does not want to change medication  · Interferes with xarelto/eliquis can cause Pseudolymphoma  · Intermittent confusion and possible subclinical seizure     6. Peripheral neuropathy, chronic low back pain, spinal cord stimulator revision 8/27/18     7.  Constipation  Plan     1.   Await Bone marrow biopsy results- It might help explain some of his back pain although I doubt it .  Bone marrow reviewed and it is not showing any lymphoma or leukemia.  Mild increase in plasma cells.    2.   GI consult for dropping hemoglobin and abnormal colo on CAT scan.  Colonoscopy reviewed.  Only internal hemorrhoids.    3.  Confusion which is very intermittent etiology unclear?  Seizures subclinical.  Seen by Dr. Earl, patient on Zyprexa    Carmen Godoy MD                                     4/16/2019      CC:

## 2019-04-16 NOTE — PLAN OF CARE
Problem: Patient Care Overview  Goal: Plan of Care Review  Pt A&Ox3, able to discuss current events with accuracy, VSS, pending colonoscopy scheduled for today, will continue to monitor patient progress  Goal: Individualization and Mutuality  Outcome: Ongoing (interventions implemented as appropriate)    Goal: Discharge Needs Assessment  Outcome: Ongoing (interventions implemented as appropriate)    Goal: Interprofessional Rounds/Family Conf  Outcome: Ongoing (interventions implemented as appropriate)

## 2019-04-16 NOTE — PROGRESS NOTES
"DAILY PROGRESS NOTE  [facility]    Patient Identification:  Name: Chava Stone Sr.  Age: 68 y.o.  Sex: male  :  1950  MRN: [id number]         Primary Care Physician: Gordy Gupta MD      Subjective   Denies any new complaints.  Chronic back and leg pain continues.  Colonoscopy today negative.  He is a little upset about being discharged to rehab.  Denies any bleeding..  He denies any chest pain or palpitations.  No shortness of breath or cough.  No nausea or vomiting.  Objective:  General Appearance:  Comfortable, well-appearing, in no acute distress and not in pain.    Vital signs: (most recent): Blood pressure 168/86, pulse 84, temperature 98.2 °F (36.8 °C), temperature source Oral, resp. rate 20, height 172.7 cm (68\"), weight 116 kg (255 lb), SpO2 100 %.  No fever.    Lungs:  Normal effort and normal respiratory rate.    Heart: Normal rate.  Regular rhythm.    Abdomen: Abdomen is soft and non-distended.  There is no abdominal tenderness.     Extremities: There is no dependent edema.    Neurological: Patient is alert and oriented to person, place and time.  (   Oriented to person place and time.   with a rather peculiar affect.  Very tangential).    Skin:  Warm and dry.                Vital signs in last 24 hours:  Temp:  [97.8 °F (36.6 °C)-98.3 °F (36.8 °C)] 98.2 °F (36.8 °C)  Heart Rate:  [75-93] 84  Resp:  [18-20] 20  BP: ()/(65-89) 168/86    Intake/Output:    Intake/Output Summary (Last 24 hours) at 2019 1522  Last data filed at 2019 1144  Gross per 24 hour   Intake 470 ml   Output --   Net 470 ml         Results from last 7 days   Lab Units 19  0410 04/15/19  0811 19  0519 19  0431 19  1516 19  0615 19  0232 04/10/19  0344   WBC 10*3/mm3 7.39 9.48 8.13 7.30  --  8.83 9.40 10.44   HEMOGLOBIN g/dL 8.5* 8.9* 7.7* 8.3* 8.1* 7.9* 8.3* 7.3*   PLATELETS 10*3/mm3 364 371 272 259  --  264 237 201     Results from last 7 days   Lab " Units 04/16/19  0410 04/15/19  0811 04/14/19 0519 04/13/19 0431 04/12/19  0615 04/11/19  0232 04/10/19  0344   SODIUM mmol/L 140 140 139 142 140 142 140   POTASSIUM mmol/L 4.0 4.4 4.3 4.2 4.2 4.8 4.3   CHLORIDE mmol/L 99 99 102 104 103 105 103   CO2 mmol/L 26.1 24.3 26.7 25.4 22.3 24.5 25.1   BUN mg/dL 20 21 25* 26* 27* 31* 22   CREATININE mg/dL 0.92 1.07 0.99 1.02 1.18 1.28* 1.08   GLUCOSE mg/dL 116* 160* 97 116* 122* 175* 108*   Estimated Creatinine Clearance: 93.7 mL/min (by C-G formula based on SCr of 0.92 mg/dL).  Results from last 7 days   Lab Units 04/16/19  0410 04/15/19  0811 04/14/19  0519 04/13/19  0431 04/12/19  0615 04/11/19  0232 04/10/19  0344   CALCIUM mg/dL 9.1 9.2 8.3* 8.5* 8.6 8.9 8.8   ALBUMIN g/dL 2.80*  --   --   --   --   --   --      Results from last 7 days   Lab Units 04/16/19 0410   ALBUMIN g/dL 2.80*   BILIRUBIN mg/dL 0.5   ALK PHOS U/L 61   AST (SGOT) U/L 23   ALT (SGPT) U/L 32       Assessment:  Mental status changes - off and on.  Metabolic encephalopathy.  Neurology and psychiatry of both signed off.     Anemia  -S/p bone marrow bx.  Path negative, final results pending.   -Transfused 1 unit 4/10/19  -Hematology following  -Colonoscopy shows only grade 2 internal hemorrhoids, nonbleeding     Chronic pain  -Oral pain medication as needed  -He does not have a pain pump     H/o PE- and had clot in 10/2018  -presently on ppx doses of Lovenox.   -No blood clots on lower extremity Doppler  -+ D-dimer  -Patient cannot take Eliquis or Xarelto because of Dilantin  -off AC since he's had 5 months treatment     Hxo Seizures disorder -Dilantin  -no seizures in 15 years ago  -Dilantin level is okay, no nystagmus on exam     Hypothyroidism  - continue Synthroid     Acute renal failure  -Resolved.     Urinary retention -probable neurogenic bladder: Urology input appreciated.  Ramey catheter presently out.      UTI status post Ramey catheter: Patient not a reliable historian concerning symptoms.   I will go ahead and treat with oral antibiotics.     Nonspecific bone lesions on spine CT:     Discussed with nursing staff.    Disposition: Likely Wednesday to subacute rehab    Quang Rodgers MD  4/16/2019  3:22 PM

## 2019-04-16 NOTE — PLAN OF CARE
Problem: Patient Care Overview  Goal: Plan of Care Review  Outcome: Ongoing (interventions implemented as appropriate)   04/16/19 0713   Coping/Psychosocial   Plan of Care Reviewed With patient   Plan of Care Review   Progress improving   OTHER   Outcome Summary pt A+Ox4, pt had colonoscopy performed today, pt required 2 liters oxygen via nasal cannula after returning to floor r/t sleeping, pt will start miralax daily and currently is on a regualr diet, pt plan to discharge to LTC facility in 2 days     Goal: Interprofessional Rounds/Family Conf  Outcome: Ongoing (interventions implemented as appropriate)      Problem: Fall Risk (Adult)  Goal: Absence of Fall  Outcome: Ongoing (interventions implemented as appropriate)      Problem: Skin Injury Risk (Adult)  Goal: Skin Health and Integrity  Outcome: Ongoing (interventions implemented as appropriate)      Problem: Mobility, Physical Impaired (Adult)  Goal: Enhanced Mobility Skills  Outcome: Ongoing (interventions implemented as appropriate)    Goal: Enhanced Functional Ability  Outcome: Ongoing (interventions implemented as appropriate)

## 2019-04-16 NOTE — ANESTHESIA POSTPROCEDURE EVALUATION
"Patient: Chava Stone Sr.    Procedure Summary     Date:  04/16/19 Room / Location:   [facility] /  [facility]    Anesthesia Start:  1049 Anesthesia Stop:  1129    Procedure:  COLONOSCOPY to cecum (N/A ) Diagnosis:       Heme positive stool      (Heme positive stool [R19.5])    Surgeon:  Antonette Mar MD Provider:  Arline Pitts MD    Anesthesia Type:  MAC ASA Status:  3          Anesthesia Type: MAC  Last vitals  BP   127/86 (04/16/19 1149)   Temp   36.6 °C (97.8 °F) (04/16/19 0731)   Pulse   75 (04/16/19 1149)   Resp   20 (04/16/19 1149)     SpO2   95 % (04/16/19 1149)     Post Anesthesia Care and Evaluation    Patient location during evaluation: bedside  Patient participation: complete - patient participated  Level of consciousness: awake and alert  Pain scale: back pain.  Pain management: inadequate  Airway patency: patent  Anesthetic complications: No anesthetic complications  PONV Status: none  Cardiovascular status: acceptable  Respiratory status: acceptable  Hydration status: acceptable    Comments: /86 (BP Location: Right arm, Patient Position: Lying)   Pulse 75   Temp 36.6 °C (97.8 °F) (Oral)   Resp 20   Ht 172.7 cm (68\")   Wt 116 kg (255 lb)   SpO2 95%   BMI 38.77 kg/m²         "

## 2019-04-17 NOTE — DISCHARGE SUMMARY
Patient Name: Chava Stone Sr.  : 1950  MRN: [id number]    Date of Admission: 2019  Date of Discharge:  2019  Primary Care Physician: Gordy Gupta MD      Hospital Course     Chief Complaint:   Leg Pain (C/O BILAT. LEG PAIN; PT WITH OF CHRONIC PAIN AND IS FOLLOWED BY A PAIN MD) and Altered Mental Status (PT REPORTS WAKING THIS AM CONFUSED, PT AA&O x4 NOW; PT RECENTLY MOVED IN WITH HIS SON AND SON REPORTS THAT HE CAN'T TAKE CARE OF HIM ANYMORE)      Active Hospital Problems    Diagnosis  POA   • **Heme positive stool [R19.5]  Yes   • Leukocytosis [D72.829]  Yes   • Hallucinations [R44.3]  Yes   • Hx pulmonary embolism [Z86.711]  Yes   • Seizure disorder (CMS/HCC) [G40.909]  Yes   • Peripheral neuropathy [G62.9]  Yes   • Weakness [R53.1]  Yes   • Acquired hypothyroidism [E03.9]  Yes   • Postlaminectomy syndrome of lumbar region [M96.1]  Yes   • Lumbar radiculopathy [M54.16]  Yes   • Spinal stenosis of lumbar region [M48.061]  Yes   • Spinal cord stimulator status [Z96.89]  Not Applicable   • Pain in both lower extremities [M79.604, M79.605]  Yes      Resolved Hospital Problems   No resolved problems to display.        Hospital Course:  Mr. Stone is a 68 y.o. non-smoker with a history of chronic peripheral neuropathy with history of spinal stimulator, C. difficile, DVT and PE, hypothyroidism, HTN that presents to [facility] complaining of hallucinations.  He was admitted to our service for further care and management.  Neurology was consulted and his Lyrica was stopped.  It was felt like he had toxic encephalopathy secondary to medications.  He was seen by multiple consultations including neurology, urology, hematology, psychiatry and gastroenterology.  Urology saw him for urinary retention in the setting of BPH and prior prostate cancer.  He had a Ramey catheter placed for short period time which was removed.  He is done well voiding on his own.  Hematology/oncology saw him  as well for nonspecific bone lesions on CT of his spine and anemia.  The bone lesions were unclear significance and he will need repeat CT scan in 2-3 months.  He had a bone marrow biopsy which was unremarkable.  It was negative for leukemia or lymphoma.  His SPEP was also negative.  GI saw him for his anemia.  He had colonoscopy which only showed nonbleeding internal hemorrhoids.  Psychiatry saw him for his hallucinations and they recommended Zyprexa.  He continues with hallucinations and intermittent confusion but much better now than when he was first admitted.  He continues with his chronic low back pain radiating down to his legs from his lumbar spinal stenosis and neuropathy.  He has not walked since 2013.  He should still be on stool softeners due to his medications.  Given history of pulmonary embolism he was on therapeutic doses of Lovenox prior to arrival.  Due to the anemia he was taken off the therapeutic dose and recommended to continue prophylactic dose of Lovenox until he is mobile per hematology recommendations.  His Dilantin has interactions with oral anticoagulants and so these are not an option for him.  I have discussed discharge plan with the patient and he is in agreement.      Day of Discharge       Physical Exam:  Temp:  [97.3 °F (36.3 °C)-98.3 °F (36.8 °C)] 98.3 °F (36.8 °C)  Heart Rate:  [75-89] 84  Resp:  [18-20] 18  BP: ()/(65-89) 129/76  Body mass index is 38.77 kg/m².  Physical Exam  General Appearance:  Comfortable, well-appearing, in no acute distress and not in pain.    Lungs:  Normal effort and normal respiratory rate.    Heart: Normal rate.  Regular rhythm.    Abdomen: Abdomen is soft and non-distended.  There is no abdominal tenderness.     Extremities: There is no dependent edema.    Neurological: Patient is alert and oriented to person, place and time.  (   Oriented to person place and time.   with a rather peculiar affect.  Very tangential).    Skin:  Warm and dry.            Consultants     Consult Orders (all) (From admission, onward)    Start     Ordered    04/14/19 1312  Inpatient Gastroenterology Consult  Once     Specialty:  Gastroenterology  Provider:  Jus Weller MD    04/14/19 1311    04/13/19 1400  Inpatient Psychiatrist Consult  Once     Specialty:  Psychiatry  Provider:  Abhay Dudley III, MD    04/13/19 1359    04/07/19 1505  Inpatient Urology Consult  Once     Specialty:  Urology  Provider:  Shahbaz Cullen MD    04/07/19 1504    04/07/19 1503  Hematology & Oncology Inpatient Consult  Once     Specialty:  Hematology and Oncology  Provider:  Chava uHi MD    04/07/19 1504    04/06/19 1855  Inpatient Neurology Consult General  Once     Specialty:  Neurology  Provider:  Oz Jeronimo MD    04/06/19 1854          Pertinent Labs and Procedures     Results from last 7 days   Lab Units 04/17/19  0320 04/16/19  0410 04/15/19  0811 04/14/19  0519   WBC 10*3/mm3 7.26 7.39 9.48 8.13   HEMOGLOBIN g/dL 8.2* 8.5* 8.9* 7.7*   PLATELETS 10*3/mm3 363 364 371 272     Results from last 7 days   Lab Units 04/17/19  0320 04/16/19  0410 04/15/19  0811 04/14/19  0519   SODIUM mmol/L 138 140 140 139   POTASSIUM mmol/L 3.9 4.0 4.4 4.3   CHLORIDE mmol/L 99 99 99 102   CO2 mmol/L 27.0 26.1 24.3 26.7   BUN mg/dL 17 20 21 25*   CREATININE mg/dL 0.98 0.92 1.07 0.99   GLUCOSE mg/dL 134* 116* 160* 97   Estimated Creatinine Clearance: 87.9 mL/min (by C-G formula based on SCr of 0.98 mg/dL).  Results from last 7 days   Lab Units 04/17/19  0320 04/16/19  0410   ALBUMIN g/dL 3.10* 2.80*   BILIRUBIN mg/dL 0.3 0.5   ALK PHOS U/L 66 61   AST (SGOT) U/L 31 23   ALT (SGPT) U/L 35 32     Results from last 7 days   Lab Units 04/17/19  0320 04/16/19  0410 04/15/19  0811 04/14/19  0519   CALCIUM mg/dL 8.4* 9.1 9.2 8.3*   ALBUMIN g/dL 3.10* 2.80*  --   --                Invalid input(s): LDLCALC  Results from last 7 days   Lab Units 04/13/19  0321   URINECX  >100,000  CFU/mL Escherichia coli*       COLONOSCOPY to cecum    Ct Abdomen Pelvis Without Contrast    Result Date: 4/7/2019   1. No hydronephrosis seen on either side. Ramey catheter is present within the urinary bladder. Small amount of residual urine is seen within the bladder. 2. The patient has extensive fecal burden throughout the proximal colon, with gaseous distention of the transverse colon again noted. Apparent transition point is located at the splenic flexure. I see no obvious obstructing lesion, but the patient had a similar configuration on prior exam from October 2018. Further evaluation with colonoscopy could be considered to evaluate for any underlying lesion, particularly if the patient has any bowel symptoms The distal colon appears relatively decompressed.  Radiation dose reduction techniques were utilized, including automated exposure control and exposure modulation based on body size.  This report was finalized on 4/7/2019 11:06 PM by Dr. Sandra Mcwilliams M.D.      Xr Chest 2 View    Result Date: 4/6/2019  1. Underinflation of the lungs. 2. Mild cardiomegaly. 3. No acute process identified. Chest appears unchanged from the 10/21/2018 study.  This report was finalized on 4/6/2019 2:15 PM by Dr. Frank Lebron M.D.      Ct Cervical Spine Without Contrast    Result Date: 4/14/2019  1. Multilevel degenerative and arthritic changes appear similar to previous, no acute fracture or abnormal enhancement. 2. Stable tiny lytic lesions which again may be related to myeloma or metastatic disease.  This report was finalized on 4/14/2019 10:21 PM by Rashid Leach M.D.      Ct Cervical Spine With Contrast    Result Date: 4/14/2019  1. Multilevel degenerative and arthritic changes appear similar to previous, no acute fracture or abnormal enhancement. 2. Stable tiny lytic lesions which again may be related to myeloma or metastatic disease.  This report was finalized on 4/14/2019 10:21 PM by Rashid Leach M.D.       Ct Thoracic Spine With & Without Contrast    Result Date: 4/12/2019  1. Multilevel degenerative changes are similar to previous with minimal scoliosis and no acute osseous abnormality. 2. Tiny nonspecific lytic lesions are stable from previous, there is no abnormal enhancement.  This report was finalized on 4/12/2019 5:36 AM by Rashid Leach M.D.      Ct Guided Biopsy Bone Marrow    Result Date: 4/11/2019  Successful CT guided bone marrow aspirate and bone biopsy.  Radiation dose reduction techniques were utilized, including automated exposure control and exposure modulation based on body size.   This report was finalized on 4/11/2019 4:25 PM by Dr. Demetrius Cali MD.        Results for orders placed during the hospital encounter of 04/06/19   Duplex Venous Upper Extremity - Bilateral CAR    Narrative · Normal bilateral upper extremity venous duplex scan.               Test Results Pending at Discharge   None   Order Current Status    Methylmalonic Acid, Serum In process    Tissue Pathology Exam Preliminary result          Discharge Details        Discharge Medications      New Medications      Instructions Start Date   bisacodyl 10 MG suppository  Commonly known as:  DULCOLAX   10 mg, Rectal, Daily PRN      enoxaparin 40 MG/0.4ML solution syringe  Commonly known as:  LOVENOX  Replaces:  enoxaparin 120 MG/0.8ML solution syringe   40 mg, Subcutaneous, Every 24 Hours      folic acid 1 MG tablet  Commonly known as:  FOLVITE   1 mg, Oral, Daily   Start Date:  4/18/2019     HYDROcodone-acetaminophen 5-325 MG per tablet  Commonly known as:  NORCO   1 tablet, Oral, Every 4 Hours PRN      hydrophor ointment ointment   Topical, Every 12 Hours Scheduled      OLANZapine 10 MG tablet  Commonly known as:  ZYPREXA   10 mg, Oral, Nightly      polyethylene glycol pack packet  Commonly known as:  MIRALAX   17 g, Oral, Daily   Start Date:  4/18/2019     thiamine 100 MG tablet  Commonly known as:  VITAMIN B1   100 mg, Oral,  Daily   Start Date:  4/18/2019        Continue These Medications      Instructions Start Date   acetaminophen 325 MG tablet  Commonly known as:  TYLENOL   650 mg, Oral, Every 6 Hours PRN      aspirin 325 MG tablet   650 mg, Oral, Daily      DILANTIN 100 MG ER capsule  Generic drug:  phenytoin   500 mg, Oral, Nightly      DULoxetine 60 MG capsule  Commonly known as:  CYMBALTA   TAKE TWO CAPSULES BY MOUTH ONCE NIGHTLY      finasteride 5 MG tablet  Commonly known as:  PROSCAR   5 mg, Oral, Daily      levothyroxine 88 MCG tablet  Commonly known as:  SYNTHROID, LEVOTHROID   TAKE ONE TABLET BY MOUTH DAILY      Vitamin D 2000 units capsule   1 capsule, Oral, Daily, HOLD PRIOR TO SURG         Stop These Medications    enoxaparin 120 MG/0.8ML solution syringe  Commonly known as:  LOVENOX  Replaced by:  enoxaparin 40 MG/0.4ML solution syringe     lisinopril-hydrochlorothiazide 20-12.5 MG per tablet  Commonly known as:  PRINZIDE,ZESTORETIC     pregabalin 150 MG capsule  Commonly known as:  LYRICA            No Known Allergies      Discharge Disposition:  Skilled Nursing Facility (DC - External)    Discharge Diet:  Diet Order   Procedures   • Diet Regular       Discharge Activity:   Activity Instructions     Activity as Tolerated            CODE STATUS:    Code Status and Medical Interventions:   Ordered at: 04/06/19 1940     Code Status:    CPR     Medical Interventions (Level of Support Prior to Arrest):    Full       No future appointments.   Contact information for follow-up providers     Gordy Gupta MD Follow up.    Specialty:  Family Medicine  Contact information:  [address]  [address]  [facility] [id number] [phone]             Criss Leone MD Follow up.    Specialties:  Hematology and Oncology, Oncology, Hematology  Contact information:  [address]  [address]  [facility] [id number] [phone]                   Contact information for after-discharge care     Destination     [facility]  up.    Service:  Skilled Nursing  Contact information:  [address] [facility] [address]  [phone]                                 Time Spent on Discharge:  Greater than 30 minutes      Electronically signed by Quang Rodgers MD, 4/17/2019, 10:54 AM

## 2019-04-17 NOTE — PLAN OF CARE
Problem: Patient Care Overview  Goal: Plan of Care Review  Outcome: Ongoing (interventions implemented as appropriate)   04/17/19 1051   Coping/Psychosocial   Plan of Care Reviewed With patient   OTHER   Outcome Summary patient being discharged      Goal: Individualization and Mutuality  Outcome: Ongoing (interventions implemented as appropriate)    Goal: Discharge Needs Assessment  Outcome: Ongoing (interventions implemented as appropriate)    Goal: Interprofessional Rounds/Family Conf  Outcome: Ongoing (interventions implemented as appropriate)      Problem: Fall Risk (Adult)  Goal: Absence of Fall  Outcome: Ongoing (interventions implemented as appropriate)

## 2019-04-17 NOTE — PROGRESS NOTES
[facility]    Physicians Statement of Medical Necessity for Ambulance Transportation    It is medically necessary for:    Patient Name: Chava Stone Sr.    Insurance Information:      To be transported by ambulance:    From (if nursing facility, specify level of care: skilled, long-term, etc): [facility]    To (specify level of care if nursing facility): [facility]    Date of Service: 2019      For dialysis patients state date dialysis began:     Diagnosis: GI bleed,     Past Medical/Surgical History:  Past Medical History:   Diagnosis Date   • Anemia    • Arthritis    • At risk for sleep apnea     STOP BANG 5   • Chronic pain    • Diverticulosis of sigmoid colon 2011   • DVT (deep venous thrombosis) (CMS/HCC)    • Epilepsy (CMS/HCC)     Since childhood   • History of solitary pulmonary nodule    • History of vitamin D deficiency    • Hyperprolactinemia (CMS/HCC)    • Hypertension    • Hypogonadism in male    • Hypothyroidism    • Injury of back    • Leg pain    • Low back pain     DDD   • Lumbar canal stenosis    • Lumbar radiculopathy    • Lytic bone lesions on xray    • Peripheral neuropathy    • Pulmonary embolism (CMS/HCC)       Past Surgical History:   Procedure Laterality Date   • COLONOSCOPY      1 polyp a 18 cm proximal to anus; sigmoid diverticulosis   • EPIDURAL BLOCK     • HAND SURGERY Right    • PROSTATE SURGERY      Biopsy, benign   • SPINAL CORD STIMULATOR IMPLANT     • SPINAL CORD STIMULATOR IMPLANT N/A 2018    Procedure: [place] [facility]- Spinal Cord Stimulator revision--battery ;  Surgeon: Ced Castro MD;  Location: [facility];  Service: Pain Management   • THYROID BIOPSY      using Intraspinal Neurostimulator        Current Objective Medical Evidence(including physical exam finding to support reason for limitations):    Immobilization syndrome    Other: confusion at times    Physician Signature:           (RN,NP,PA,CAN,  Discharge Planner)Muna Ferguson RN   Date/Time: 4/17/2019 8:57 AM       Printed Name:    __________________________________    [facility]   Phone: [phone] Phone: [phone]   Fax: [phone] Fax: [phone]

## 2019-04-17 NOTE — PROGRESS NOTES
[facility]  Inpatient Progress Note    Reason for Follow Up:  Anemia, heme positive stool    Subjective     Interval History:   No issues from GI standpoint post colonoscopy- discussed results with pt - plan for d/c to rehab today noted    Current Facility-Administered Medications:   •  acetaminophen (TYLENOL) tablet 650 mg, 650 mg, Oral, Q4H PRN, Jus Barreto MD, 650 mg at 04/16/19 2337  •  aspirin tablet 650 mg, 650 mg, Oral, Daily, Jus Barreto MD, 650 mg at 04/17/19 0853  •  bisacodyl (DULCOLAX) suppository 10 mg, 10 mg, Rectal, Daily PRN, Criss Leone MD, 10 mg at 04/10/19 1130  •  DULoxetine (CYMBALTA) DR capsule 120 mg, 120 mg, Oral, Nightly, Jus Barreto MD, 120 mg at 04/16/19 2043  •  enoxaparin (LOVENOX) syringe 40 mg, 40 mg, Subcutaneous, Q24H, uJs Barreto MD, 40 mg at 04/16/19 2325  •  finasteride (PROSCAR) tablet 5 mg, 5 mg, Oral, Daily, Jus Barreto MD, 5 mg at 04/17/19 0852  •  folic acid (FOLVITE) tablet 1 mg, 1 mg, Oral, Daily, Criss Leone MD, 1 mg at 04/17/19 0852  •  HYDROcodone-acetaminophen (NORCO) 5-325 MG per tablet 1 tablet, 1 tablet, Oral, Q4H PRN, Quang Rodgers MD, 1 tablet at 04/16/19 1808  •  hydrophor (AQUAPHOR) ointment, , Topical, Q12H, Jus Barreto MD  •  lactated ringers infusion, 30 mL/hr, Intravenous, Continuous PRN, Antonette Mar MD, Stopped at 04/16/19 1144  •  levothyroxine (SYNTHROID, LEVOTHROID) tablet 88 mcg, 88 mcg, Oral, Q AM, Jus Barreto MD, 88 mcg at 04/17/19 0608  •  OLANZapine (zyPREXA) injection 10 mg, 10 mg, Intramuscular, Q8H PRN, Abhay Dudley III, MD, 10 mg at 04/12/19 0522  •  phenytoin (DILANTIN) ER capsule 500 mg, 500 mg, Oral, Nightly, Jus Barreto MD, 500 mg at 04/16/19 2043  •  polyethylene glycol 3350 powder (packet), 17 g, Oral, Daily, Antonette Mar MD  •  [COMPLETED] Insert peripheral IV, , , Once **AND** sodium chloride 0.9 % flush 10 mL, 10  mL, Intravenous, PRN, Tiara Meraz APRN  •  sodium chloride 0.9 % flush 3 mL, 3 mL, Intravenous, Q12H, Jus Barreto MD, 3 mL at 04/17/19 0853  •  sodium chloride 0.9 % flush 3-10 mL, 3-10 mL, Intravenous, PRN, Jus Barreto MD  •  thiamine (VITAMIN B-1) tablet 100 mg, 100 mg, Oral, Daily, Jus Barreto MD, 100 mg at 04/17/19 0852  Review of Systems:    The following systems were reviewed and negative;  gastrointestinal    Objective     Vital Signs  Temp:  [97.3 °F (36.3 °C)-98.3 °F (36.8 °C)] 98.3 °F (36.8 °C)  Heart Rate:  [75-89] 84  Resp:  [18-20] 18  BP: ()/(65-89) 129/76  Body mass index is 38.77 kg/m².    Intake/Output Summary (Last 24 hours) at 4/17/2019 1017  Last data filed at 4/17/2019 0637  Gross per 24 hour   Intake 590 ml   Output 900 ml   Net -310 ml     No intake/output data recorded.     Physical Exam:   General: patient awake, alert and cooperative   Eyes: Normal lids and lashes, no scleral icterus   Neck: supple, normal ROM   Skin: warm and dry, not jaundiced   Abdomen: soft, nontender, nondistended; normal bowel sounds   Psychiatric: Normal mood and behavior; memory intact     Results Review:     I reviewed the patient's new clinical results.    Results from last 7 days   Lab Units 04/17/19  0320 04/16/19  0410 04/15/19  0811   WBC 10*3/mm3 7.26 7.39 9.48   HEMOGLOBIN g/dL 8.2* 8.5* 8.9*   HEMATOCRIT % 27.2* 27.1* 29.1*   PLATELETS 10*3/mm3 363 364 371     Results from last 7 days   Lab Units 04/17/19 0320 04/16/19  0410 04/15/19  0811   SODIUM mmol/L 138 140 140   POTASSIUM mmol/L 3.9 4.0 4.4   CHLORIDE mmol/L 99 99 99   CO2 mmol/L 27.0 26.1 24.3   BUN mg/dL 17 20 21   CREATININE mg/dL 0.98 0.92 1.07   CALCIUM mg/dL 8.4* 9.1 9.2   BILIRUBIN mg/dL 0.3 0.5  --    ALK PHOS U/L 66 61  --    ALT (SGPT) U/L 35 32  --    AST (SGOT) U/L 31 23  --    GLUCOSE mg/dL 134* 116* 160*     Results from last 7 days   Lab Units 04/16/19  0410 04/11/19  0232   INR  0.99 1.03     Lab  Results   Lab Value Date/Time    LIPASE 51 10/21/2018 1238       Radiology:  CT Cervical Spine With Contrast   Final Result   1. Multilevel degenerative and arthritic changes appear similar to   previous, no acute fracture or abnormal enhancement.   2. Stable tiny lytic lesions which again may be related to myeloma or   metastatic disease.       This report was finalized on 4/14/2019 10:21 PM by Rashid Leach M.D.          CT Cervical Spine Without Contrast   Final Result   1. Multilevel degenerative and arthritic changes appear similar to   previous, no acute fracture or abnormal enhancement.   2. Stable tiny lytic lesions which again may be related to myeloma or   metastatic disease.       This report was finalized on 4/14/2019 10:21 PM by Rashid Leach M.D.          CT Thoracic Spine With & Without Contrast   Final Result   1. Multilevel degenerative changes are similar to previous with minimal   scoliosis and no acute osseous abnormality.   2. Tiny nonspecific lytic lesions are stable from previous, there is no   abnormal enhancement.       This report was finalized on 4/12/2019 5:36 AM by Rashid Leach M.D.          CT Guided Biopsy Bone Marrow   Final Result   Successful CT guided bone marrow aspirate and bone biopsy.        Radiation dose reduction techniques were utilized, including automated   exposure control and exposure modulation based on body size.           This report was finalized on 4/11/2019 4:25 PM by Dr. Demetrius Cali MD.          CT Abdomen Pelvis Without Contrast   Final Result       1. No hydronephrosis seen on either side. Ramey catheter is present   within the urinary bladder. Small amount of residual urine is seen   within the bladder.   2. The patient has extensive fecal burden throughout the proximal colon,   with gaseous distention of the transverse colon again noted. Apparent   transition point is located at the splenic flexure. I see no obvious   obstructing lesion, but the  patient had a similar configuration on prior   exam from October 2018. Further evaluation with colonoscopy could be   considered to evaluate for any underlying lesion, particularly if the   patient has any bowel symptoms The distal colon appears relatively   decompressed.       Radiation dose reduction techniques were utilized, including automated   exposure control and exposure modulation based on body size.       This report was finalized on 4/7/2019 11:06 PM by Dr. Sandra Mcwilliams M.D.          XR Chest 2 View   Final Result   1. Underinflation of the lungs.   2. Mild cardiomegaly.   3. No acute process identified. Chest appears unchanged from the   10/21/2018 study.       This report was finalized on 4/6/2019 2:15 PM by Dr. Frank Lebron M.D.          CT Head Without Contrast   Final Result          Assessment/Plan     Patient Active Problem List   Diagnosis   • Spinal stenosis of lumbar region   • Lumbar radiculopathy   • Spinal cord stimulator status   • Pain in both lower extremities   • Chronic pain   • Postlaminectomy syndrome of lumbar region   • Benign essential hypertension   • Colon polyp   • Diverticulosis of large intestine   • Elevated prostate specific antigen (PSA)   • Psychomotor epilepsy (CMS/HCC)   • Hyperprolactinemia (CMS/HCC)   • Hypogonadism in male   • Acquired hypothyroidism   • Malignant neoplasm of prostate (CMS/HCC)   • Solitary pulmonary nodule   • Vitamin D deficiency   • Battery end of life of spinal cord stimulator   • Weakness   • Peripheral neuropathy   • Diarrhea   • C. difficile colitis   • Pulmonary embolus (CMS/HCC)   • Acute deep vein thrombosis (DVT) of distal end of right lower extremity (CMS/HCC)   • Hallucinations   • Hx pulmonary embolism   • Seizure disorder (CMS/HCC)   • Leukocytosis   • Heme positive stool     Assessment:  1. Anemia, heme positive stool  2. H/o c diff  3. Confusion  4. Chronic constipation    Plan:  - h/h stable  - negative GI evaluation in  terms of explanation for anemia- no visualized bleeding  - recommend daily miralax due to chronic constipation likely caused by meds, immobility etc  - ok to d/c from gi standpoint          I discussed the patients findings and my recommendations with patient and nursing staff.    Antonette Mar MD

## 2019-04-17 NOTE — PLAN OF CARE
Problem: Patient Care Overview  Goal: Plan of Care Review  Outcome: Ongoing (interventions implemented as appropriate)   04/17/19 0541   Coping/Psychosocial   Plan of Care Reviewed With patient   Plan of Care Review   Progress no change   OTHER   Outcome Summary Pt remained A/O x4 during the night with intermittent sleep, remain hallucinations free. Placed 2L oxygen to maintain sats during sleep. VSS- will continue to monitor. 4/17/19 @0545       Problem: Fall Risk (Adult)  Goal: Absence of Fall  Outcome: Ongoing (interventions implemented as appropriate)      Problem: Skin Injury Risk (Adult)  Goal: Skin Health and Integrity  Outcome: Ongoing (interventions implemented as appropriate)

## 2019-04-17 NOTE — PROGRESS NOTES
Continued Stay Note  [facility]     Patient Name: Chava Stone Sr.  MRN: [id number]  Today's Date: 4/17/2019    Admit Date: 4/6/2019    Discharge Plan     Row Name 04/17/19 1118       Plan    Plan  Skilled rehab at [facility] via [facility] at noon    Patient/Family in Agreement with Plan  yes    Plan Comments  Spokew ith patient and Hanny Stone, dtr,  at bedside.  They agree with plasn for [facility].  They prefer ambulance transport for patient.  [facility] is scheduled for noon today.  Rachael notified of DC orders.  She has pre cert for skilled rehab...........................Muna Ferguson RN        Discharge Codes    No documentation.       Expected Discharge Date and Time     Expected Discharge Date Expected Discharge Time    Apr 17, 2019             Muna Ferguson RN

## 2019-04-18 NOTE — PROGRESS NOTES
Case Management Discharge Note    Final Note: [facility]    Destination - Selection Complete      Service Provider Request Status Selected Services Address Phone Number Fax Number    [facility] [id number], [place] [id number] [phone]      Durable Medical Equipment      No service has been selected for the patient.      Dialysis/Infusion      No service has been selected for the patient.      Home Medical Care      No service has been selected for the patient.      Therapy      No service has been selected for the patient.      Community Resources      No service has been selected for the patient.        Transportation Services  Ambulance: [facility]    Final Discharge Disposition Code: 03 - skilled nursing facility (SNF)

## 2019-05-01 NOTE — TELEPHONE ENCOUNTER
He is in the hospital/rehab at on [facility],  after encephalopathy. Cause not clear.   [patient said he was not taking Lyrica].    Dilantin level 10.9 on 4/6/19.    My review of lab shows MMA was high.     I recommended he stay on branded Dilantin in rehab unit. He will be in rehab for awhile.     I recommended he have the PCP consider B12 injections b/c of the high MMA.     He will ask the rehab doctor to look at the MMA. If not MD2 can do it.

## 2019-05-02 NOTE — TELEPHONE ENCOUNTER
----- Message from Leigh Serna sent at 5/2/2019 10:29 AM EDT -----  Contact: [phone]  Patient called wanting to make  aware that he is receiving the generic form of Dilantin.He would like to change to name brand. He's at [facility] [phone]

## 2019-05-06 NOTE — TELEPHONE ENCOUNTER
I have faxed the Printed Rx     ----- Message from Catracho Yo MD sent at 5/6/2019 10:47 AM EDT -----   I called  [facility] [employer] today at [phone] concerning the Dilantin situation.    Patient is on brand-name medication at home.  He indicated they were giving him generic phenytoin at [facility].      I asked her to check his Dilantin dose and she indicated it was generic phenytoin 100 mg hs. I told her that this was the wrong dose.      They asked that we fax  New order for branded DILANTIN.    [facility] fax number  [phone]    Please fax the order.  I printed it.

## 2019-05-06 NOTE — TELEPHONE ENCOUNTER
I called the [facility] today at [phone] concerning the Dilantin situation.    Patient is on brand-name medication at home.  He indicated they were giving him generic phenytoin at [facility].  On April 6 the Dilantin level was 10.9.    They asked that we fax  New order for branded DILANTIN.  I asked her to check his Dilantin dose and she indicated it was generic phenytoin 100 mg hs.    [facility] fax number  [phone]

## 2019-05-12 ENCOUNTER — INPATIENT HOSPITAL (OUTPATIENT)
Dept: URBAN - METROPOLITAN AREA HOSPITAL 107 | Facility: HOSPITAL | Age: 69
End: 2019-05-12
Payer: COMMERCIAL

## 2019-05-12 DIAGNOSIS — R93.3 ABNORMAL FINDINGS ON DIAGNOSTIC IMAGING OF OTHER PARTS OF DI: ICD-10-CM

## 2019-05-12 DIAGNOSIS — I25.10 ATHEROSCLEROTIC HEART DISEASE OF NATIVE CORONARY ARTERY WITH: ICD-10-CM

## 2019-05-12 DIAGNOSIS — N18.9 CHRONIC KIDNEY DISEASE, UNSPECIFIED: ICD-10-CM

## 2019-05-12 DIAGNOSIS — K21.9 GASTRO-ESOPHAGEAL REFLUX DISEASE WITHOUT ESOPHAGITIS: ICD-10-CM

## 2019-05-12 DIAGNOSIS — R19.4 CHANGE IN BOWEL HABIT: ICD-10-CM

## 2019-05-12 DIAGNOSIS — Z85.46 PERSONAL HISTORY OF MALIGNANT NEOPLASM OF PROSTATE: ICD-10-CM

## 2019-05-12 DIAGNOSIS — I25.2 OLD MYOCARDIAL INFARCTION: ICD-10-CM

## 2019-05-12 DIAGNOSIS — D64.9 ANEMIA, UNSPECIFIED: ICD-10-CM

## 2019-05-12 DIAGNOSIS — M48.00 SPINAL STENOSIS, SITE UNSPECIFIED: ICD-10-CM

## 2019-05-12 DIAGNOSIS — I10 ESSENTIAL (PRIMARY) HYPERTENSION: ICD-10-CM

## 2019-05-12 PROCEDURE — 99223 1ST HOSP IP/OBS HIGH 75: CPT | Performed by: INTERNAL MEDICINE

## 2019-05-13 ENCOUNTER — INPATIENT HOSPITAL (OUTPATIENT)
Dept: URBAN - METROPOLITAN AREA HOSPITAL 107 | Facility: HOSPITAL | Age: 69
End: 2019-05-13
Payer: COMMERCIAL

## 2019-05-13 DIAGNOSIS — K56.7 ILEUS, UNSPECIFIED: ICD-10-CM

## 2019-05-13 PROCEDURE — 99231 SBSQ HOSP IP/OBS SF/LOW 25: CPT | Performed by: INTERNAL MEDICINE

## 2019-05-14 NOTE — TELEPHONE ENCOUNTER
Ok.  He needs to take his own branded dilantin while he is there.  I have told him this in the past.

## 2019-05-17 NOTE — TELEPHONE ENCOUNTER
I spoke to Pt's wife and explained this to her 2 days ago. She was going to the pharmacy to pick it up for him.

## 2019-09-08 PROBLEM — R41.0 CONFUSION: Status: ACTIVE | Noted: 2019-01-01

## 2019-09-08 PROBLEM — R53.1 GENERALIZED WEAKNESS: Status: ACTIVE | Noted: 2019-01-01

## 2019-09-09 PROBLEM — H53.9 VISUAL CHANGES: Status: ACTIVE | Noted: 2019-01-01

## 2019-09-09 PROBLEM — L89.90 PRESSURE ULCER: Status: ACTIVE | Noted: 2019-01-01

## 2019-09-09 PROBLEM — G93.41 METABOLIC ENCEPHALOPATHY: Status: ACTIVE | Noted: 2019-01-01

## 2019-09-09 PROBLEM — R33.8 ACUTE URINARY RETENTION: Status: ACTIVE | Noted: 2019-01-01

## 2019-09-14 NOTE — PROGRESS NOTES
OPHTHALMOLOGY CONSULT NOTE    Patient Identification:  Name: Chava Stone Sr.  Age: 68 y.o.  Sex: male  :  1950  MRN: [id number]                                               Requesting Physician: Dr Barreto  Reason for consult: Vision loss OU    History of Present Illness:  68 y.o. male with history of metabolic encephalopathy admitted for confusion and hallucinations.  Patient reports vision loss of unknown origin, he reports that this might have occurred gradually since approximately March of this year.  He denies any pain in the eyes.  He states that he sees things such as a napkin with cream cheese all over it on his chest, then he realizes that it is not there.  Patient denies any previous ocular history.  He denies that this happened suddenly.    Problem List:  Active Problems:    * No active hospital problems. *      Past Medical History:  Past Medical History:   Diagnosis Date   • Anemia    • Arthritis    • At risk for sleep apnea     STOP BANG 5   • Chronic pain    • Diverticulosis of sigmoid colon 2011   • DVT (deep venous thrombosis) (CMS/HCC)    • Epilepsy (CMS/HCC)     Since childhood   • History of solitary pulmonary nodule    • History of vitamin D deficiency    • Hyperprolactinemia (CMS/HCC)    • Hypertension    • Hypogonadism in male    • Hypothyroidism    • Injury of back    • Leg pain    • Low back pain     DDD   • Lumbar canal stenosis    • Lumbar radiculopathy    • Lytic bone lesions on xray    • Peripheral neuropathy    • Pulmonary embolism (CMS/HCC)        Past Surgical History:  Past Surgical History:   Procedure Laterality Date   • COLONOSCOPY  2011 polyp a 18 cm proximal to anus; sigmoid diverticulosis   • COLONOSCOPY N/A 2019    Procedure: COLONOSCOPY to cecum;  Surgeon: Antonette Mar MD;  Location: [facility] ENDOSCOPY;  Service: Gastroenterology   • EPIDURAL BLOCK     • HAND SURGERY Right    • PROSTATE SURGERY      Biopsy, benign   • SPINAL CORD  STIMULATOR IMPLANT     • SPINAL CORD STIMULATOR IMPLANT N/A 2018    Procedure: [facility]- Spinal Cord Stimulator revision--battery ;  Surgeon: Ced Castro MD;  Location: [facility];  Service: Pain Management   • THYROID BIOPSY      using Intraspinal Neurostimulator        Past Ocular History: Denies    ROS:  Pertinent items are noted in HPI, all other systems reviewed and negative    Eyes: positive for visual disturbance  Ocular Medication: None    Home Meds:    (Not in a hospital admission)    Current Meds:   No current facility-administered medications for this visit.   No current outpatient medications on file.    Facility-Administered Medications Ordered in Other Visits:   •  acetaminophen (TYLENOL) tablet 650 mg, 650 mg, Oral, Q4H PRN, 650 mg at 19 0041 **OR** acetaminophen (TYLENOL) 160 MG/5ML solution 650 mg, 650 mg, Oral, Q4H PRN **OR** acetaminophen (TYLENOL) suppository 650 mg, 650 mg, Rectal, Q4H PRN, Maria Dolores Dunn, APRN  •  acetaminophen (TYLENOL) tablet 650 mg, 650 mg, Oral, Q6H PRN, Jus Barreto MD, 650 mg at 19 2049  •  aspirin tablet 325 mg, 325 mg, Oral, Daily, Jus Barreto MD, 325 mg at 19 0859  •  bisacodyl (DULCOLAX) suppository 10 mg, 10 mg, Rectal, Daily PRN, Cathi Hernandez, APRN  •  cefTRIAXone (ROCEPHIN) IVPB 1 g, 1 g, Intravenous, Q24H, Jus Barreto MD, Last Rate: 100 mL/hr at 19 1701, 1 g at 19 1701  •  enoxaparin (LOVENOX) syringe 40 mg, 40 mg, Subcutaneous, Q24H, Jus Barreto MD, 40 mg at 19 1106  •  finasteride (PROSCAR) tablet 5 mg, 5 mg, Oral, Daily, Jus Barreto MD, 5 mg at 19 0858  •  folic acid (FOLVITE) tablet 1 mg, 1 mg, Oral, Daily, Jus Barreto MD, 1 mg at 19 0858  •  HYDROcodone-acetaminophen (NORCO) 7.5-325 MG per tablet 1 tablet, 1 tablet, Oral, Q6H PRN, Jus Barreto MD, 1 tablet at 19 0503  •  levothyroxine (SYNTHROID, LEVOTHROID) tablet 88 mcg, 88 mcg,  Oral, Daily, Jus Barreto MD, 88 mcg at 09/14/19 0858  •  LORazepam (ATIVAN) injection 1 mg, 1 mg, Intravenous, Once, Cathi Hernandez APRN  •  metoprolol tartrate (LOPRESSOR) tablet 12.5 mg, 12.5 mg, Oral, Q12H, Tessa Logan MD, 12.5 mg at 09/14/19 0858  •  miconazole (MICOTIN) 2 % powder, , Topical, Q12H, Jus Barreto MD  •  ondansetron (ZOFRAN) injection 4 mg, 4 mg, Intravenous, Q6H PRN, Maria Dolores Dunn APRN  •  phenytoin (DILANTIN) ER capsule 500 mg, 500 mg, Oral, Nightly, Jus Barreto MD, 500 mg at 09/13/19 2222  •  polyethylene glycol 3350 powder (packet), 17 g, Oral, Daily, Jus Barreto MD, 17 g at 09/14/19 0859  •  sennosides-docusate sodium (SENOKOT-S) 8.6-50 MG tablet 2 tablet, 2 tablet, Oral, Nightly, Cathi Hernandez APRN, 2 tablet at 09/13/19 2221  •  sodium chloride 0.9 % bolus 500 mL, 500 mL, Intravenous, Once, Jus Barreto MD, Last Rate: 500 mL/hr at 09/13/19 1611, 500 mL at 09/13/19 1611  •  [COMPLETED] Insert peripheral IV, , , Once **AND** sodium chloride 0.9 % flush 10 mL, 10 mL, Intravenous, PRN, Tahir Hammond MD  •  sodium chloride 0.9 % flush 10 mL, 10 mL, Intravenous, Q12H, Maria Dolores Dunn APRN, 10 mL at 09/14/19 0859  •  sodium chloride 0.9 % flush 10 mL, 10 mL, Intravenous, PRN, Maria Dolores Dunn APRN  •  sodium chloride 0.9 % infusion, 100 mL/hr, Intravenous, Continuous, Jus Barreto MD, Last Rate: 100 mL/hr at 09/14/19 0556, 100 mL/hr at 09/14/19 0556  •  tamsulosin (FLOMAX) 24 hr capsule 0.4 mg, 0.4 mg, Oral, Daily, Cathi Hernandez APRN, 0.4 mg at 09/14/19 0858  •  thiamine (VITAMIN B-1) tablet 100 mg, 100 mg, Oral, Daily, Jus Barreto MD, 100 mg at 09/14/19 0859    Allergies:  Allergies   Allergen Reactions   • Phenytoin Mental Status Change     Only reaction to generic form.       Social History:   Social History     Tobacco Use   • Smoking status: Never Smoker   • Smokeless tobacco: Never Used   Substance Use Topics   • Alcohol use:  No     Frequency: Never        Family History:  Denies h/o glaucoma, retinal detachment, strabismus, amblyopia    Objective:  General Appearance: NAD    Exam:    VA sc P T EOM    Light perception 6 +APD 53 Full    Light perception 6-->5 sluggish 49 Full      SLE/PLE    With 20D lens at bedside     OD OS   External/Lid wnl wnl   Conj/sclera White/quiet White/quiet   Cornea clear clear   Anterior Chamber formed formed   Iris Round/sluggish Round/sluggish   Lens 2+ NSC 2+NSC   Vitreous clear clear     Dilated Fundus Exam: 12:26 PM  OD - optic nerve -severe cupping and pallor of the optic nerve  macula, vessels, periphery -within normal limits with mild diabetic changes  OS - optic nerve -severe cupping and pallor of the optic nerve  macula, vessels, periphery -within normal limits with mild diabetic changes    Imaging:  No orders to display       Data Review:  Radiology review: CT head no acute intracranial abnormality    Assessment/Recommendations:  Chava Stone Sr. is a 68 y.o. with    1.  Severe bilateral vision loss -although I am uncertain as to the acuteness of the blindness, I believe that this is likely due to advanced glaucoma in both eyes.  There is some concern that the patient has been on amiodarone for a few months, however I do not see optic nerve swelling which is the classic optic nerve exam finding with amiodarone toxicity.  That being said if there is an alternative therapy, I would recommend discontinuing the amiodarone.  I believe that the patient is having visual hallucinations due to his bilateral blindness, also known as Abhay Bonnet syndrome.    2.  Advanced primary open-angle glaucoma OU -the patient has significantly elevated intraocular pressure without pain and pale, cupped optic nerves bilaterally.  Unfortunately the only thing that can be done while he is inpatient is to initiate drops to help lower his intraocular pressure.  I will place an order for Cosopt twice daily in each  eye.    Patient needs to follow up on discharge with comprehensive ophthalmologist, call for appointment with [facility] [phone] .    Thank you for the consult.    eGn Hammond MD  9/14/2019 12:26 PM

## 2019-09-23 PROBLEM — H40.9 GLAUCOMA: Status: ACTIVE | Noted: 2019-01-01

## 2019-09-23 PROBLEM — N39.0 ACUTE UTI (URINARY TRACT INFECTION): Status: ACTIVE | Noted: 2019-01-01

## 2019-10-08 NOTE — TELEPHONE ENCOUNTER
Patient needs appointment for further refills.  Please let patient know we can send in a bridge prescription as soon as follow-up appointment is made.   Statement Selected

## 2020-01-01 ENCOUNTER — APPOINTMENT (OUTPATIENT)
Dept: GENERAL RADIOLOGY | Facility: HOSPITAL | Age: 70
End: 2020-01-01

## 2020-01-01 ENCOUNTER — HOSPITAL ENCOUNTER (INPATIENT)
Facility: HOSPITAL | Age: 70
LOS: 2 days | End: 2020-03-16
Attending: EMERGENCY MEDICINE | Admitting: INTERNAL MEDICINE

## 2020-01-01 ENCOUNTER — LAB REQUISITION (OUTPATIENT)
Dept: LAB | Facility: HOSPITAL | Age: 70
End: 2020-01-01

## 2020-01-01 VITALS
SYSTOLIC BLOOD PRESSURE: 66 MMHG | DIASTOLIC BLOOD PRESSURE: 41 MMHG | TEMPERATURE: 98.9 F | BODY MASS INDEX: 28.63 KG/M2 | OXYGEN SATURATION: 70 % | HEIGHT: 68 IN | WEIGHT: 188.93 LBS

## 2020-01-01 DIAGNOSIS — A41.9 SEPTIC SHOCK (HCC): Primary | ICD-10-CM

## 2020-01-01 DIAGNOSIS — R65.21 SEPTIC SHOCK (HCC): Primary | ICD-10-CM

## 2020-01-01 DIAGNOSIS — N17.9 ACUTE RENAL FAILURE, UNSPECIFIED ACUTE RENAL FAILURE TYPE (HCC): ICD-10-CM

## 2020-01-01 DIAGNOSIS — L97.901 ULCER OF LOWER EXTREMITY, LIMITED TO BREAKDOWN OF SKIN, UNSPECIFIED LATERALITY (HCC): ICD-10-CM

## 2020-01-01 DIAGNOSIS — Z00.00 ROUTINE GENERAL MEDICAL EXAMINATION AT A HEALTH CARE FACILITY: ICD-10-CM

## 2020-01-01 LAB
ALBUMIN SERPL-MCNC: 3 G/DL (ref 3.5–5.2)
ALBUMIN/GLOB SERPL: 1 G/DL
ALP SERPL-CCNC: 90 U/L (ref 39–117)
ALT SERPL W P-5'-P-CCNC: 86 U/L (ref 1–41)
ANION GAP SERPL CALCULATED.3IONS-SCNC: 20.2 MMOL/L (ref 5–15)
AST SERPL-CCNC: 126 U/L (ref 1–40)
B PARAPERT DNA SPEC QL NAA+PROBE: NOT DETECTED
B PERT DNA SPEC QL NAA+PROBE: NOT DETECTED
BACTERIA UR QL AUTO: ABNORMAL /HPF
BACTERIA UR QL AUTO: ABNORMAL /HPF
BASOPHILS # BLD AUTO: 0.03 10*3/MM3 (ref 0–0.2)
BASOPHILS NFR BLD AUTO: 0.2 % (ref 0–1.5)
BILIRUB SERPL-MCNC: 0.5 MG/DL (ref 0.2–1.2)
BILIRUB UR QL STRIP: NEGATIVE
BILIRUB UR QL STRIP: NEGATIVE
BUN BLD-MCNC: 65 MG/DL (ref 8–23)
BUN/CREAT SERPL: 16.8 (ref 7–25)
C PNEUM DNA NPH QL NAA+NON-PROBE: NOT DETECTED
CALCIUM SPEC-SCNC: 8 MG/DL (ref 8.6–10.5)
CHLORIDE SERPL-SCNC: 102 MMOL/L (ref 98–107)
CLARITY UR: ABNORMAL
CLARITY UR: ABNORMAL
CO2 SERPL-SCNC: 19.8 MMOL/L (ref 22–29)
COLOR UR: ABNORMAL
COLOR UR: YELLOW
CREAT BLD-MCNC: 3.86 MG/DL (ref 0.76–1.27)
D-LACTATE SERPL-SCNC: 1 MMOL/L (ref 0.5–2)
DEPRECATED RDW RBC AUTO: 46.1 FL (ref 37–54)
EOSINOPHIL # BLD AUTO: 0.12 10*3/MM3 (ref 0–0.4)
EOSINOPHIL NFR BLD AUTO: 0.8 % (ref 0.3–6.2)
ERYTHROCYTE [DISTWIDTH] IN BLOOD BY AUTOMATED COUNT: 13.1 % (ref 12.3–15.4)
EXPIRATION DATE: NORMAL
FECAL OCCULT BLOOD SCREEN, POC: NEGATIVE
FLUAV H1 2009 PAND RNA NPH QL NAA+PROBE: NOT DETECTED
FLUAV H1 HA GENE NPH QL NAA+PROBE: NOT DETECTED
FLUAV H3 RNA NPH QL NAA+PROBE: NOT DETECTED
FLUAV SUBTYP SPEC NAA+PROBE: NOT DETECTED
FLUBV RNA ISLT QL NAA+PROBE: NOT DETECTED
GFR SERPL CREATININE-BSD FRML MDRD: 16 ML/MIN/1.73
GLOBULIN UR ELPH-MCNC: 3 GM/DL
GLUCOSE BLD-MCNC: 119 MG/DL (ref 65–99)
GLUCOSE BLDC GLUCOMTR-MCNC: 125 MG/DL (ref 70–130)
GLUCOSE BLDC GLUCOMTR-MCNC: 130 MG/DL (ref 70–130)
GLUCOSE UR STRIP-MCNC: NEGATIVE MG/DL
GLUCOSE UR STRIP-MCNC: NEGATIVE MG/DL
HADV DNA SPEC NAA+PROBE: NOT DETECTED
HCOV 229E RNA SPEC QL NAA+PROBE: NOT DETECTED
HCOV HKU1 RNA SPEC QL NAA+PROBE: NOT DETECTED
HCOV NL63 RNA SPEC QL NAA+PROBE: NOT DETECTED
HCOV OC43 RNA SPEC QL NAA+PROBE: NOT DETECTED
HCT VFR BLD AUTO: 24 % (ref 37.5–51)
HGB BLD-MCNC: 7.8 G/DL (ref 13–17.7)
HGB UR QL STRIP.AUTO: ABNORMAL
HGB UR QL STRIP.AUTO: ABNORMAL
HMPV RNA NPH QL NAA+NON-PROBE: NOT DETECTED
HPIV1 RNA SPEC QL NAA+PROBE: NOT DETECTED
HPIV2 RNA SPEC QL NAA+PROBE: NOT DETECTED
HPIV3 RNA NPH QL NAA+PROBE: NOT DETECTED
HPIV4 P GENE NPH QL NAA+PROBE: NOT DETECTED
HYALINE CASTS UR QL AUTO: ABNORMAL /LPF
HYALINE CASTS UR QL AUTO: ABNORMAL /LPF
IMM GRANULOCYTES # BLD AUTO: 0.17 10*3/MM3 (ref 0–0.05)
IMM GRANULOCYTES NFR BLD AUTO: 1.1 % (ref 0–0.5)
KETONES UR QL STRIP: NEGATIVE
KETONES UR QL STRIP: NEGATIVE
LEUKOCYTE ESTERASE UR QL STRIP.AUTO: ABNORMAL
LEUKOCYTE ESTERASE UR QL STRIP.AUTO: ABNORMAL
LYMPHOCYTES # BLD AUTO: 2.69 10*3/MM3 (ref 0.7–3.1)
LYMPHOCYTES NFR BLD AUTO: 17.9 % (ref 19.6–45.3)
Lab: 140
M PNEUMO IGG SER IA-ACNC: NOT DETECTED
MCH RBC QN AUTO: 31.3 PG (ref 26.6–33)
MCHC RBC AUTO-ENTMCNC: 32.5 G/DL (ref 31.5–35.7)
MCV RBC AUTO: 96.4 FL (ref 79–97)
MONOCYTES # BLD AUTO: 1.2 10*3/MM3 (ref 0.1–0.9)
MONOCYTES NFR BLD AUTO: 8 % (ref 5–12)
NEGATIVE CONTROL: NEGATIVE
NEUTROPHILS # BLD AUTO: 10.83 10*3/MM3 (ref 1.7–7)
NEUTROPHILS NFR BLD AUTO: 72 % (ref 42.7–76)
NITRITE UR QL STRIP: NEGATIVE
NITRITE UR QL STRIP: POSITIVE
NRBC BLD AUTO-RTO: 0 /100 WBC (ref 0–0.2)
PH UR STRIP.AUTO: 8 [PH] (ref 5–8)
PH UR STRIP.AUTO: 8 [PH] (ref 5–8)
PHENYTOIN SERPL-MCNC: 8.3 MCG/ML (ref 10–20)
PLATELET # BLD AUTO: 208 10*3/MM3 (ref 140–450)
PMV BLD AUTO: 9.5 FL (ref 6–12)
POSITIVE CONTROL: POSITIVE
POTASSIUM BLD-SCNC: 4.3 MMOL/L (ref 3.5–5.2)
PROCALCITONIN SERPL-MCNC: 14.97 NG/ML (ref 0.1–0.25)
PROT SERPL-MCNC: 6 G/DL (ref 6–8.5)
PROT UR QL STRIP: ABNORMAL
PROT UR QL STRIP: ABNORMAL
RBC # BLD AUTO: 2.49 10*6/MM3 (ref 4.14–5.8)
RBC # UR: ABNORMAL /HPF
RBC # UR: ABNORMAL /HPF
REF LAB TEST METHOD: ABNORMAL
REF LAB TEST METHOD: ABNORMAL
RHINOVIRUS RNA SPEC NAA+PROBE: NOT DETECTED
RSV RNA NPH QL NAA+NON-PROBE: NOT DETECTED
SODIUM BLD-SCNC: 142 MMOL/L (ref 136–145)
SP GR UR STRIP: 1.01 (ref 1–1.03)
SP GR UR STRIP: 1.02 (ref 1–1.03)
SQUAMOUS #/AREA URNS HPF: ABNORMAL /HPF
SQUAMOUS #/AREA URNS HPF: ABNORMAL /HPF
TROPONIN T SERPL-MCNC: 0.13 NG/ML (ref 0–0.03)
UROBILINOGEN UR QL STRIP: ABNORMAL
UROBILINOGEN UR QL STRIP: ABNORMAL
WBC NRBC COR # BLD: 15.04 10*3/MM3 (ref 3.4–10.8)
WBC UR QL AUTO: ABNORMAL /HPF
WBC UR QL AUTO: ABNORMAL /HPF

## 2020-01-01 PROCEDURE — 25010000002 HYDROMORPHONE 1 MG/ML SOLUTION: Performed by: INTERNAL MEDICINE

## 2020-01-01 PROCEDURE — 25010000002 CEFEPIME PER 500 MG: Performed by: EMERGENCY MEDICINE

## 2020-01-01 PROCEDURE — 87186 SC STD MICRODIL/AGAR DIL: CPT | Performed by: EMERGENCY MEDICINE

## 2020-01-01 PROCEDURE — 71045 X-RAY EXAM CHEST 1 VIEW: CPT

## 2020-01-01 PROCEDURE — 87086 URINE CULTURE/COLONY COUNT: CPT | Performed by: EMERGENCY MEDICINE

## 2020-01-01 PROCEDURE — 93010 ELECTROCARDIOGRAM REPORT: CPT | Performed by: INTERNAL MEDICINE

## 2020-01-01 PROCEDURE — 80185 ASSAY OF PHENYTOIN TOTAL: CPT | Performed by: EMERGENCY MEDICINE

## 2020-01-01 PROCEDURE — 85025 COMPLETE CBC W/AUTO DIFF WBC: CPT | Performed by: EMERGENCY MEDICINE

## 2020-01-01 PROCEDURE — 93005 ELECTROCARDIOGRAM TRACING: CPT | Performed by: EMERGENCY MEDICINE

## 2020-01-01 PROCEDURE — 84484 ASSAY OF TROPONIN QUANT: CPT | Performed by: EMERGENCY MEDICINE

## 2020-01-01 PROCEDURE — 87077 CULTURE AEROBIC IDENTIFY: CPT | Performed by: EMERGENCY MEDICINE

## 2020-01-01 PROCEDURE — 99231 SBSQ HOSP IP/OBS SF/LOW 25: CPT | Performed by: SURGERY

## 2020-01-01 PROCEDURE — 81001 URINALYSIS AUTO W/SCOPE: CPT | Performed by: EMERGENCY MEDICINE

## 2020-01-01 PROCEDURE — 25010000002 LORAZEPAM PER 2 MG: Performed by: INTERNAL MEDICINE

## 2020-01-01 PROCEDURE — 87040 BLOOD CULTURE FOR BACTERIA: CPT | Performed by: EMERGENCY MEDICINE

## 2020-01-01 PROCEDURE — 81001 URINALYSIS AUTO W/SCOPE: CPT

## 2020-01-01 PROCEDURE — 83605 ASSAY OF LACTIC ACID: CPT | Performed by: EMERGENCY MEDICINE

## 2020-01-01 PROCEDURE — 82962 GLUCOSE BLOOD TEST: CPT

## 2020-01-01 PROCEDURE — 0100U HC BIOFIRE FILMARRAY RESP PANEL 2: CPT | Performed by: EMERGENCY MEDICINE

## 2020-01-01 PROCEDURE — P9612 CATHETERIZE FOR URINE SPEC: HCPCS

## 2020-01-01 PROCEDURE — 87186 SC STD MICRODIL/AGAR DIL: CPT

## 2020-01-01 PROCEDURE — 25010000002 VANCOMYCIN 10 G RECONSTITUTED SOLUTION: Performed by: EMERGENCY MEDICINE

## 2020-01-01 PROCEDURE — 99285 EMERGENCY DEPT VISIT HI MDM: CPT

## 2020-01-01 PROCEDURE — 80053 COMPREHEN METABOLIC PANEL: CPT | Performed by: EMERGENCY MEDICINE

## 2020-01-01 PROCEDURE — 82270 OCCULT BLOOD FECES: CPT | Performed by: EMERGENCY MEDICINE

## 2020-01-01 PROCEDURE — 72170 X-RAY EXAM OF PELVIS: CPT

## 2020-01-01 PROCEDURE — 84145 PROCALCITONIN (PCT): CPT | Performed by: EMERGENCY MEDICINE

## 2020-01-01 RX ORDER — PROMETHAZINE HYDROCHLORIDE 25 MG/ML
6.25 INJECTION, SOLUTION INTRAMUSCULAR; INTRAVENOUS EVERY 4 HOURS PRN
Status: DISCONTINUED | OUTPATIENT
Start: 2020-01-01 | End: 2020-01-01 | Stop reason: HOSPADM

## 2020-01-01 RX ORDER — SCOLOPAMINE TRANSDERMAL SYSTEM 1 MG/1
1 PATCH, EXTENDED RELEASE TRANSDERMAL
Status: DISCONTINUED | OUTPATIENT
Start: 2020-01-01 | End: 2020-01-01 | Stop reason: HOSPADM

## 2020-01-01 RX ORDER — DULOXETIN HYDROCHLORIDE 60 MG/1
60 CAPSULE, DELAYED RELEASE ORAL DAILY
COMMUNITY

## 2020-01-01 RX ORDER — LORAZEPAM 2 MG/ML
2 INJECTION INTRAMUSCULAR
Status: DISCONTINUED | OUTPATIENT
Start: 2020-01-01 | End: 2020-01-01 | Stop reason: HOSPADM

## 2020-01-01 RX ORDER — PROMETHAZINE HYDROCHLORIDE 12.5 MG/1
6.25 SUPPOSITORY RECTAL EVERY 4 HOURS PRN
Status: DISCONTINUED | OUTPATIENT
Start: 2020-01-01 | End: 2020-01-01 | Stop reason: HOSPADM

## 2020-01-01 RX ORDER — MORPHINE SULFATE 2 MG/ML
2 INJECTION, SOLUTION INTRAMUSCULAR; INTRAVENOUS
Status: DISCONTINUED | OUTPATIENT
Start: 2020-01-01 | End: 2020-01-01 | Stop reason: HOSPADM

## 2020-01-01 RX ORDER — LORAZEPAM 2 MG/ML
1 CONCENTRATE ORAL
Status: DISCONTINUED | OUTPATIENT
Start: 2020-01-01 | End: 2020-01-01 | Stop reason: HOSPADM

## 2020-01-01 RX ORDER — LORAZEPAM 2 MG/ML
0.5 INJECTION INTRAMUSCULAR
Status: DISCONTINUED | OUTPATIENT
Start: 2020-01-01 | End: 2020-01-01 | Stop reason: HOSPADM

## 2020-01-01 RX ORDER — LEVETIRACETAM 5 MG/ML
500 INJECTION INTRAVASCULAR EVERY 12 HOURS SCHEDULED
Status: DISCONTINUED | OUTPATIENT
Start: 2020-01-01 | End: 2020-01-01

## 2020-01-01 RX ORDER — ACETAMINOPHEN 650 MG/1
650 SUPPOSITORY RECTAL EVERY 4 HOURS PRN
Status: DISCONTINUED | OUTPATIENT
Start: 2020-01-01 | End: 2020-01-01 | Stop reason: HOSPADM

## 2020-01-01 RX ORDER — PROMETHAZINE HYDROCHLORIDE 25 MG/1
6.25 TABLET ORAL EVERY 4 HOURS PRN
Status: DISCONTINUED | OUTPATIENT
Start: 2020-01-01 | End: 2020-01-01 | Stop reason: HOSPADM

## 2020-01-01 RX ORDER — MORPHINE SULFATE 20 MG/ML
20 SOLUTION ORAL
Status: DISCONTINUED | OUTPATIENT
Start: 2020-01-01 | End: 2020-01-01 | Stop reason: HOSPADM

## 2020-01-01 RX ORDER — LORAZEPAM 2 MG/ML
1 INJECTION INTRAMUSCULAR
Status: DISCONTINUED | OUTPATIENT
Start: 2020-01-01 | End: 2020-01-01 | Stop reason: HOSPADM

## 2020-01-01 RX ORDER — TIZANIDINE 4 MG/1
4 TABLET ORAL EVERY 8 HOURS PRN
COMMUNITY

## 2020-01-01 RX ORDER — OXYCODONE AND ACETAMINOPHEN 7.5; 325 MG/1; MG/1
1 TABLET ORAL EVERY 4 HOURS PRN
COMMUNITY

## 2020-01-01 RX ORDER — SODIUM PHOSPHATE, DIBASIC AND SODIUM PHOSPHATE, MONOBASIC 7; 19 G/133ML; G/133ML
ENEMA RECTAL ONCE
COMMUNITY

## 2020-01-01 RX ORDER — ACETAMINOPHEN 325 MG/1
650 TABLET ORAL EVERY 4 HOURS PRN
Status: DISCONTINUED | OUTPATIENT
Start: 2020-01-01 | End: 2020-01-01 | Stop reason: HOSPADM

## 2020-01-01 RX ORDER — NOREPINEPHRINE BIT/0.9 % NACL 8 MG/250ML
.02-.3 INFUSION BOTTLE (ML) INTRAVENOUS
Status: DISCONTINUED | OUTPATIENT
Start: 2020-01-01 | End: 2020-01-01

## 2020-01-01 RX ORDER — MORPHINE SULFATE 20 MG/ML
5 SOLUTION ORAL
Status: DISCONTINUED | OUTPATIENT
Start: 2020-01-01 | End: 2020-01-01 | Stop reason: HOSPADM

## 2020-01-01 RX ORDER — LEVOTHYROXINE SODIUM 88 UG/1
88 TABLET ORAL DAILY
Status: DISCONTINUED | OUTPATIENT
Start: 2020-01-01 | End: 2020-01-01

## 2020-01-01 RX ORDER — HYDROMORPHONE HYDROCHLORIDE 1 MG/ML
0.5 INJECTION, SOLUTION INTRAMUSCULAR; INTRAVENOUS; SUBCUTANEOUS
Status: DISCONTINUED | OUTPATIENT
Start: 2020-01-01 | End: 2020-01-01 | Stop reason: HOSPADM

## 2020-01-01 RX ORDER — GABAPENTIN 300 MG/1
300 CAPSULE ORAL 2 TIMES DAILY
COMMUNITY

## 2020-01-01 RX ORDER — ATROPINE SULFATE 10 MG/ML
2 SOLUTION/ DROPS OPHTHALMIC 2 TIMES DAILY PRN
Status: DISCONTINUED | OUTPATIENT
Start: 2020-01-01 | End: 2020-01-01 | Stop reason: HOSPADM

## 2020-01-01 RX ORDER — ATORVASTATIN CALCIUM 40 MG/1
40 TABLET, FILM COATED ORAL DAILY
COMMUNITY

## 2020-01-01 RX ORDER — LORAZEPAM 2 MG/ML
0.5 CONCENTRATE ORAL
Status: DISCONTINUED | OUTPATIENT
Start: 2020-01-01 | End: 2020-01-01 | Stop reason: HOSPADM

## 2020-01-01 RX ORDER — LORAZEPAM 2 MG/ML
2 CONCENTRATE ORAL
Status: DISCONTINUED | OUTPATIENT
Start: 2020-01-01 | End: 2020-01-01 | Stop reason: HOSPADM

## 2020-01-01 RX ORDER — MORPHINE SULFATE 20 MG/ML
10 SOLUTION ORAL
Status: DISCONTINUED | OUTPATIENT
Start: 2020-01-01 | End: 2020-01-01 | Stop reason: HOSPADM

## 2020-01-01 RX ORDER — PROMETHAZINE HYDROCHLORIDE 6.25 MG/5ML
6.25 SYRUP ORAL EVERY 4 HOURS PRN
Status: DISCONTINUED | OUTPATIENT
Start: 2020-01-01 | End: 2020-01-01 | Stop reason: HOSPADM

## 2020-01-01 RX ORDER — MORPHINE SULFATE 2 MG/ML
4 INJECTION, SOLUTION INTRAMUSCULAR; INTRAVENOUS
Status: DISCONTINUED | OUTPATIENT
Start: 2020-01-01 | End: 2020-01-01 | Stop reason: HOSPADM

## 2020-01-01 RX ORDER — DIPHENOXYLATE HYDROCHLORIDE AND ATROPINE SULFATE 2.5; .025 MG/1; MG/1
1 TABLET ORAL
Status: DISCONTINUED | OUTPATIENT
Start: 2020-01-01 | End: 2020-01-01 | Stop reason: HOSPADM

## 2020-01-01 RX ORDER — ACETAMINOPHEN 160 MG/5ML
650 SOLUTION ORAL EVERY 4 HOURS PRN
Status: DISCONTINUED | OUTPATIENT
Start: 2020-01-01 | End: 2020-01-01 | Stop reason: HOSPADM

## 2020-01-01 RX ORDER — SODIUM CHLORIDE 9 MG/ML
150 INJECTION, SOLUTION INTRAVENOUS CONTINUOUS
Status: DISCONTINUED | OUTPATIENT
Start: 2020-01-01 | End: 2020-01-01

## 2020-01-01 RX ORDER — MORPHINE SULFATE 10 MG/ML
6 INJECTION INTRAMUSCULAR; INTRAVENOUS; SUBCUTANEOUS
Status: DISCONTINUED | OUTPATIENT
Start: 2020-01-01 | End: 2020-01-01 | Stop reason: HOSPADM

## 2020-01-01 RX ADMIN — SODIUM CHLORIDE 1000 ML: 9 INJECTION, SOLUTION INTRAVENOUS at 15:06

## 2020-01-01 RX ADMIN — SCOPALAMINE 1 PATCH: 1 PATCH, EXTENDED RELEASE TRANSDERMAL at 08:09

## 2020-01-01 RX ADMIN — VANCOMYCIN HYDROCHLORIDE 1750 MG: 10 INJECTION, POWDER, LYOPHILIZED, FOR SOLUTION INTRAVENOUS at 12:16

## 2020-01-01 RX ADMIN — Medication 0.02 MCG/KG/MIN: at 11:40

## 2020-01-01 RX ADMIN — HYDROMORPHONE HYDROCHLORIDE 1 MG: 1 INJECTION, SOLUTION INTRAMUSCULAR; INTRAVENOUS; SUBCUTANEOUS at 15:42

## 2020-01-01 RX ADMIN — HYDROMORPHONE HYDROCHLORIDE 1 MG: 1 INJECTION, SOLUTION INTRAMUSCULAR; INTRAVENOUS; SUBCUTANEOUS at 08:08

## 2020-01-01 RX ADMIN — SODIUM CHLORIDE 1000 ML: 9 INJECTION, SOLUTION INTRAVENOUS at 11:45

## 2020-01-01 RX ADMIN — LORAZEPAM 2 MG: 2 INJECTION INTRAMUSCULAR; INTRAVENOUS at 15:42

## 2020-01-01 RX ADMIN — HYDROMORPHONE HYDROCHLORIDE 1 MG: 1 INJECTION, SOLUTION INTRAMUSCULAR; INTRAVENOUS; SUBCUTANEOUS at 18:28

## 2020-01-01 RX ADMIN — HYDROMORPHONE HYDROCHLORIDE 1 MG: 1 INJECTION, SOLUTION INTRAMUSCULAR; INTRAVENOUS; SUBCUTANEOUS at 04:31

## 2020-01-01 RX ADMIN — GLYCOPYRROLATE 0.2 MG: 0.2 INJECTION, SOLUTION INTRAMUSCULAR; INTRAVITREAL at 18:28

## 2020-01-01 RX ADMIN — SODIUM CHLORIDE 150 ML/HR: 9 INJECTION, SOLUTION INTRAVENOUS at 16:23

## 2020-01-01 RX ADMIN — LORAZEPAM 2 MG: 2 INJECTION INTRAMUSCULAR; INTRAVENOUS at 20:15

## 2020-01-01 RX ADMIN — HYDROMORPHONE HYDROCHLORIDE 1 MG: 1 INJECTION, SOLUTION INTRAMUSCULAR; INTRAVENOUS; SUBCUTANEOUS at 20:15

## 2020-01-01 RX ADMIN — GLYCOPYRROLATE 0.2 MG: 0.2 INJECTION, SOLUTION INTRAMUSCULAR; INTRAVITREAL at 20:15

## 2020-01-01 RX ADMIN — CEFEPIME HYDROCHLORIDE 2 G: 2 INJECTION, POWDER, FOR SOLUTION INTRAVENOUS at 11:41

## 2020-01-01 RX ADMIN — HYDROMORPHONE HYDROCHLORIDE 1 MG: 1 INJECTION, SOLUTION INTRAMUSCULAR; INTRAVENOUS; SUBCUTANEOUS at 12:31

## 2020-01-01 RX ADMIN — SODIUM CHLORIDE 1000 ML: 9 INJECTION, SOLUTION INTRAVENOUS at 16:52

## 2020-01-01 RX ADMIN — GLYCOPYRROLATE 0.2 MG: 0.2 INJECTION, SOLUTION INTRAMUSCULAR; INTRAVITREAL at 08:08

## 2020-01-01 RX ADMIN — SODIUM CHLORIDE 1000 ML: 9 INJECTION, SOLUTION INTRAVENOUS at 10:54

## 2020-01-01 RX ADMIN — LORAZEPAM 0.5 MG: 2 INJECTION INTRAMUSCULAR; INTRAVENOUS at 04:32

## 2020-01-01 RX ADMIN — LORAZEPAM 2 MG: 2 INJECTION INTRAMUSCULAR; INTRAVENOUS at 18:28

## 2020-01-01 RX ADMIN — HYDROMORPHONE HYDROCHLORIDE 1 MG: 1 INJECTION, SOLUTION INTRAMUSCULAR; INTRAVENOUS; SUBCUTANEOUS at 09:59

## 2020-01-01 RX ADMIN — LORAZEPAM 0.5 MG: 2 INJECTION INTRAMUSCULAR; INTRAVENOUS at 23:29

## 2020-01-01 RX ADMIN — LORAZEPAM 1 MG: 2 INJECTION INTRAMUSCULAR; INTRAVENOUS at 08:09

## 2020-01-01 RX ADMIN — SODIUM CHLORIDE 2736 ML: 9 INJECTION, SOLUTION INTRAVENOUS at 11:13

## 2020-01-01 RX ADMIN — GLYCOPYRROLATE 0.2 MG: 0.2 INJECTION, SOLUTION INTRAMUSCULAR; INTRAVITREAL at 15:42

## 2020-01-01 RX ADMIN — LORAZEPAM 2 MG: 2 INJECTION INTRAMUSCULAR; INTRAVENOUS at 09:59

## 2020-01-01 RX ADMIN — LORAZEPAM 2 MG: 2 INJECTION INTRAMUSCULAR; INTRAVENOUS at 12:31

## 2020-01-01 RX ADMIN — GLYCOPYRROLATE 0.2 MG: 0.2 INJECTION, SOLUTION INTRAMUSCULAR; INTRAVITREAL at 12:31

## 2020-01-01 RX ADMIN — HYDROMORPHONE HYDROCHLORIDE 1 MG: 1 INJECTION, SOLUTION INTRAMUSCULAR; INTRAVENOUS; SUBCUTANEOUS at 23:28

## 2020-03-14 PROBLEM — R65.21 SEPTIC SHOCK (HCC): Status: ACTIVE | Noted: 2020-01-01

## 2020-03-14 PROBLEM — A41.9 SEPTIC SHOCK (HCC): Status: ACTIVE | Noted: 2020-01-01

## 2020-03-14 NOTE — PROGRESS NOTES
"Pharmacokinetic Consult - Vancomycin Dosing (Follow-Up Note)    Chava Stone Sr. is a 69 y.o. male who is on day 1 pharmacy to dose vancomycin for sepsis w/ LE cellulitis & ulcers.  Pharmacy dosing per Dr. Silver's request.   Other antimicrobials: Zosyn  Goal vancomycin trough: 15-20 mg/L    Relevant clinical data and objective history reviewed:  172.7 cm (68\")  85.7 kg (188 lb 15 oz)  Body mass index is 28.73 kg/m².     He has a past medical history of Anemia, Arthritis, At risk for sleep apnea, Chronic pain, Diverticulosis of sigmoid colon (07/14/2011), DVT (deep venous thrombosis) (CMS/[facility]), Epilepsy (CMS/[facility]), History of solitary pulmonary nodule, History of vitamin D deficiency, Hyperprolactinemia (CMS/[facility]) (2011), Hypertension, Hypogonadism in male, Hypothyroidism, Injury of back, Leg pain, Low back pain, Lumbar canal stenosis, Lumbar radiculopathy, Lytic bone lesions on xray (2018), Peripheral neuropathy, and Pulmonary embolism (CMS/[facility]).    Allergies as of 03/14/2020 - Reviewed 03/14/2020   Allergen Reaction Noted   • Phenytoin Mental Status Change 09/09/2019     Vital Signs (last 24 hours)       03/13 0700  -  03/14 0659 03/14 0700  -  03/14 1525   Most Recent    Temp (°F)   96.6 -  97.4     97.4 (36.3)    Heart Rate   54 -  84     72    Resp     18     18    BP   61/50 -  110/62     68/40    SpO2 (%)   72 -  100     96        Estimated Creatinine Clearance: 19 mL/min (A) (by C-G formula based on SCr of 3.86 mg/dL (H)).  Results from last 7 days   Lab Units 03/14/20  1056   CREATININE mg/dL 3.86*     Results from last 7 days   Lab Units 03/14/20  1056   WBC 10*3/mm3 15.04*     Baseline culture/source/susceptibility:   BCx x2 sets (03/14): in process  RVP (03/14): negative  UCx (03/14): in process     Labs:  Results from last 7 days   Lab Units 03/14/20  1056   PROCALCITONIN ng/mL 14.97*     Results from last 7 days   Lab Units 03/14/20  1056   LACTATE mmol/L 1.0      Vancomycin Dosing History  1750 mg " (~20 mg/kg) IV x1 dose: 03/14 1216    Assessment/Plan  1) Vancomycin intermittent dosing due to MYRIAM with elevated SCr. No further dosing today.    2) Random level & SCr ordered for tomorrow with AM labs.     Pharmacy will continue to follow daily while on vancomycin and adjust as needed.     Thank you for this consult,    Zac Rodríguez, PharmD, PRASHANTH, BCPS

## 2020-03-14 NOTE — NURSING NOTE
Patient son (POA) at bedside and wishes to go palliative with full comfort measures- that patient did not wish to have life prolonging measures such as pressors or a central line.  Family wishes to stop all treatment and make patient comfort care only.  Dr Larose notified and orders per telephone for palliative care transfer.  Patient's son wishes to be called in the event of patient decline or patient transfer to .

## 2020-03-14 NOTE — ED PROVIDER NOTES
EMERGENCY DEPARTMENT ENCOUNTER    Room Number:  16/16  Date of encounter:  3/14/2020  PCP: Gabbie Padgett MD  Historian: patient, EMS      HPI:  Chief Complaint: altered mental status  A complete HPI/ROS/PMH/PSH/SH/FH are unobtainable due to: mental status change  Context: Chava Stone Sr. is a 69 y.o. male who presents to the ED from [facility] with AMS that was first noticed by staff today. Per EMS, the pt is typically alert, oriented, and interactive. Today, however, staff noticed that the pt would not respond to them and would only react to painful stimuli. NH report that the pt had a fever of 101. BGL was 124 per EMS. The pt was also noted to be hypotensive en route. The pt reports having some RLE pain, but does not offer any other complaints to me.     Patient was wearing a face mask when I entered the room and they continued to wear a mask throughout their stay in the ED.  I wore PPE, including a gown, gloves, face mask with shield or face mask with goggles whenever I was in the room with patient. My scribe was not in the room.    MEDICAL RECORD REVIEW  Review the pt's NH paperwork shows that the pt is a DNR.     PAST MEDICAL HISTORY  Active Ambulatory Problems     Diagnosis Date Noted   • Spinal stenosis of lumbar region 02/29/2016   • Lumbar radiculopathy 02/29/2016   • Spinal cord stimulator status 02/29/2016   • Pain in both lower extremities 02/29/2016   • Chronic pain 02/29/2016   • Postlaminectomy syndrome of lumbar region 10/07/2016   • Benign essential hypertension 12/12/2016   • Colon polyp 12/12/2016   • Diverticulosis of large intestine 12/12/2016   • Elevated prostate specific antigen (PSA) 12/12/2016   • Psychomotor epilepsy (CMS/HCC) 12/12/2016   • Hyperprolactinemia (CMS/HCC) 12/12/2016   • Hypogonadism in male 12/12/2016   • Acquired hypothyroidism 12/12/2016   • Malignant neoplasm of prostate (CMS/HCC) 12/12/2016   • Solitary pulmonary nodule 12/12/2016   • Vitamin D deficiency  12/12/2016   • Battery end of life of spinal cord stimulator 08/15/2018   • Weakness 10/21/2018   • Peripheral neuropathy 10/22/2018   • Diarrhea 10/22/2018   • C. difficile colitis 10/26/2018   • Pulmonary embolus (CMS/[facility]) 10/26/2018   • Acute deep vein thrombosis (DVT) of distal end of right lower extremity (CMS/[facility]) 10/30/2018   • Hallucinations 04/06/2019   • Hx pulmonary embolism 04/06/2019   • Seizure disorder (CMS/[facility]) 04/06/2019   • Leukocytosis 04/08/2019   • Heme positive stool 04/15/2019   • Generalized weakness 09/08/2019   • Metabolic encephalopathy 09/08/2019   • Visual changes 09/09/2019   • Acute urinary retention 09/09/2019   • Pressure ulcer 09/09/2019   • Acute UTI (urinary tract infection) 09/23/2019   • Glaucoma 09/23/2019     Resolved Ambulatory Problems     Diagnosis Date Noted   • MYRIAM (acute kidney injury) (CMS/[facility]) 10/22/2018   • Dehydration 10/22/2018   • Hypopotassemia 10/25/2018     Past Medical History:   Diagnosis Date   • Anemia    • Arthritis    • At risk for sleep apnea    • Diverticulosis of sigmoid colon 07/14/2011   • DVT (deep venous thrombosis) (CMS/[facility])    • Epilepsy (CMS/[facility])    • History of solitary pulmonary nodule    • History of vitamin D deficiency    • Hypertension    • Hypothyroidism    • Injury of back    • Leg pain    • Low back pain    • Lumbar canal stenosis    • Lytic bone lesions on xray 2018   • Pulmonary embolism (CMS/[facility])          PAST SURGICAL HISTORY  Past Surgical History:   Procedure Laterality Date   • COLONOSCOPY  2011    1 polyp a 18 cm proximal to anus; sigmoid diverticulosis   • COLONOSCOPY N/A 4/16/2019    Procedure: COLONOSCOPY to cecum;  Surgeon: Antonette Mar MD;  Location: [facility] ENDOSCOPY;  Service: Gastroenterology   • EPIDURAL BLOCK     • HAND SURGERY Right    • PROSTATE SURGERY      Biopsy, benign   • SPINAL CORD STIMULATOR IMPLANT     • SPINAL CORD STIMULATOR IMPLANT N/A 8/27/2018    Procedure: [place] [facility]- Spinal Cord Stimulator  revision--battery ;  Surgeon: Ced Castro MD;  Location: [facility] OR;  Service: Pain Management   • THYROID BIOPSY      using Intraspinal Neurostimulator         FAMILY HISTORY  Family History   Problem Relation Age of Onset   • Heart disease Mother    • Depression Father    • Heart disease Father    • Lung cancer Brother    • Malig Hyperthermia Neg Hx          SOCIAL HISTORY  Social History     Socioeconomic History   • Marital status:      Spouse name: Not on file   • Number of children: Not on file   • Years of education: 3 years College   • Highest education level: Not on file   Occupational History   • Occupation: Manager     Employer: [employer]   Tobacco Use   • Smoking status: Never Smoker   • Smokeless tobacco: Never Used   Substance and Sexual Activity   • Alcohol use: No     Frequency: Never   • Drug use: No   • Sexual activity: Defer         ALLERGIES  Phenytoin        REVIEW OF SYSTEMS  Review of Systems   Unable to perform ROS: Mental status change        PHYSICAL EXAM    I have reviewed the triage vital signs and nursing notes.    ED Triage Vitals   Temp Pulse Resp BP SpO2   -- -- -- -- --      Temp src Heart Rate Source Patient Position BP Location FiO2 (%)   -- -- -- -- --         Physical Exam   Constitutional: No distress.   Awake. Speaking to me.   HENT:   Head: Normocephalic and atraumatic.   Eyes: Pupils are equal, round, and reactive to light. EOM are normal.   Neck: Normal range of motion. Neck supple.   Cardiovascular: Normal rate, regular rhythm and normal heart sounds.   Pulmonary/Chest: Effort normal and breath sounds normal. No respiratory distress.   Abdominal: Soft. There is no tenderness. There is no rebound and no guarding.   Musculoskeletal: Normal range of motion. He exhibits no edema.   There is pain in the RLE. Wounds to the bilateral feet with drainage from the L foot.   Neurological: He is alert. He has normal sensation and normal strength.    Oriented to person.   Skin: Skin is warm and dry.   Psychiatric: Mood and affect normal.   Nursing note and vitals reviewed.      LAB RESULTS  Recent Results (from the past 24 hour(s))   Comprehensive Metabolic Panel    Collection Time: 03/14/20 10:56 AM   Result Value Ref Range    Glucose 119 (H) 65 - 99 mg/dL    BUN 65 (H) 8 - 23 mg/dL    Creatinine 3.86 (H) 0.76 - 1.27 mg/dL    Sodium 142 136 - 145 mmol/L    Potassium 4.3 3.5 - 5.2 mmol/L    Chloride 102 98 - 107 mmol/L    CO2 19.8 (L) 22.0 - 29.0 mmol/L    Calcium 8.0 (L) 8.6 - 10.5 mg/dL    Total Protein 6.0 6.0 - 8.5 g/dL    Albumin 3.00 (L) 3.50 - 5.20 g/dL    ALT (SGPT) 86 (H) 1 - 41 U/L    AST (SGOT) 126 (H) 1 - 40 U/L    Alkaline Phosphatase 90 39 - 117 U/L    Total Bilirubin 0.5 0.2 - 1.2 mg/dL    eGFR Non African Amer 16 (L) >60 mL/min/1.73    Globulin 3.0 gm/dL    A/G Ratio 1.0 g/dL    BUN/Creatinine Ratio 16.8 7.0 - 25.0    Anion Gap 20.2 (H) 5.0 - 15.0 mmol/L   Lactic Acid, Plasma    Collection Time: 03/14/20 10:56 AM   Result Value Ref Range    Lactate 1.0 0.5 - 2.0 mmol/L   Procalcitonin    Collection Time: 03/14/20 10:56 AM   Result Value Ref Range    Procalcitonin 14.97 (H) 0.10 - 0.25 ng/mL   Phenytoin Level, Total    Collection Time: 03/14/20 10:56 AM   Result Value Ref Range    Phenytoin Level 8.3 (L) 10.0 - 20.0 mcg/mL   CBC Auto Differential    Collection Time: 03/14/20 10:56 AM   Result Value Ref Range    WBC 15.04 (H) 3.40 - 10.80 10*3/mm3    RBC 2.49 (L) 4.14 - 5.80 10*6/mm3    Hemoglobin 7.8 (L) 13.0 - 17.7 g/dL    Hematocrit 24.0 (L) 37.5 - 51.0 %    MCV 96.4 79.0 - 97.0 fL    MCH 31.3 26.6 - 33.0 pg    MCHC 32.5 31.5 - 35.7 g/dL    RDW 13.1 12.3 - 15.4 %    RDW-SD 46.1 37.0 - 54.0 fl    MPV 9.5 6.0 - 12.0 fL    Platelets 208 140 - 450 10*3/mm3    Neutrophil % 72.0 42.7 - 76.0 %    Lymphocyte % 17.9 (L) 19.6 - 45.3 %    Monocyte % 8.0 5.0 - 12.0 %    Eosinophil % 0.8 0.3 - 6.2 %    Basophil % 0.2 0.0 - 1.5 %    Immature Grans % 1.1 (H)  0.0 - 0.5 %    Neutrophils, Absolute 10.83 (H) 1.70 - 7.00 10*3/mm3    Lymphocytes, Absolute 2.69 0.70 - 3.10 10*3/mm3    Monocytes, Absolute 1.20 (H) 0.10 - 0.90 10*3/mm3    Eosinophils, Absolute 0.12 0.00 - 0.40 10*3/mm3    Basophils, Absolute 0.03 0.00 - 0.20 10*3/mm3    Immature Grans, Absolute 0.17 (H) 0.00 - 0.05 10*3/mm3    nRBC 0.0 0.0 - 0.2 /100 WBC   Troponin    Collection Time: 03/14/20 10:56 AM   Result Value Ref Range    Troponin T 0.128 (C) 0.000 - 0.030 ng/mL   Urinalysis With Culture If Indicated - Urine, Catheter    Collection Time: 03/14/20 11:12 AM   Result Value Ref Range    Color, UA Dark Yellow (A) Yellow, Straw    Appearance, UA Cloudy (A) Clear    pH, UA 8.0 5.0 - 8.0    Specific Gravity, UA 1.017 1.005 - 1.030    Glucose, UA Negative Negative    Ketones, UA Negative Negative    Bilirubin, UA Negative Negative    Blood, UA Trace (A) Negative    Protein,  mg/dL (2+) (A) Negative    Leuk Esterase, UA Small (1+) (A) Negative    Nitrite, UA Negative Negative    Urobilinogen, UA 1.0 E.U./dL 0.2 - 1.0 E.U./dL   Urinalysis, Microscopic Only - Urine, Catheter    Collection Time: 03/14/20 11:12 AM   Result Value Ref Range    RBC, UA 0-2 None Seen, 0-2 /HPF    WBC, UA 21-30 (A) None Seen, 0-2 /HPF    Bacteria, UA 2+ (A) None Seen /HPF    Squamous Epithelial Cells, UA 0-2 None Seen, 0-2 /HPF    Hyaline Casts, UA 0-2 None Seen /LPF    Methodology Automated Microscopy    POCT Occult Blood Stool    Collection Time: 03/14/20 11:23 AM   Result Value Ref Range    Fecal Occult Blood Negative Negative    Lot Number [id number]     Expiration Date 11/30/2020     Positive Control Positive Positive    Negative Control Negative Negative       Ordered the above labs and independently reviewed the results.        RADIOLOGY  Xr Chest 1 View    Result Date: 3/14/2020  XR CHEST 1 VW-  HISTORY: Male who is 69 years-old,  arrhythmia  TECHNIQUE: Supine view of the chest  COMPARISON: 9/7/2019  FINDINGS: The heart size is  borderline. Pulmonary vasculature shows mild central congestion. Aorta is tortuous. Lung volume is low. Small likely atelectasis or infiltrate left base. No pleural effusion or pneumothorax is seen, limited by supine positioning. No acute osseous process.      Small likely atelectasis or infiltrate at the left base, follow-up suggested. Borderline heart size, mild central vascular congestion. Tortuous aorta.  This report was finalized on 3/14/2020 11:59 AM by Dr. Guy Freedman M.D.      Xr Pelvis 1 Or 2 View    Result Date: 3/14/2020  XR PELVIS 1 OR 2 VW-  INDICATIONS: Pain in the right leg  TECHNIQUE: Frontal views of the pelvis  COMPARISON: None available  FINDINGS:  Advanced degenerative changes of the hips are present, including joint space narrowing, marginal spurring, subcortical eburnation, osseous remodeling on both sides of the joints. No obvious displaced fracture is identified; if there is clinical suspicion for fracture, additional views and/or cross-sectional imaging would be recommended. Electronic device projects over the left iliac wing.         As described.  This report was finalized on 3/14/2020 12:02 PM by Dr. Guy Freedman M.D.        I ordered the above noted radiological studies. Reviewed by me.  See dictation for official radiology interpretation.      PROCEDURES    Critical Care  Performed by: Sherman Madrigal MD  Authorized by: Sherman Madrigal MD     Critical care provider statement:     Critical care time (minutes):  45    Critical care time was exclusive of:  Separately billable procedures and treating other patients    Critical care was necessary to treat or prevent imminent or life-threatening deterioration of the following conditions:  Sepsis, renal failure and shock    Critical care was time spent personally by me on the following activities:  Development of treatment plan with patient or surrogate, discussions with consultants, evaluation of patient's response to  treatment, examination of patient, interpretation of cardiac output measurements, obtaining history from patient or surrogate, ordering and performing treatments and interventions, ordering and review of laboratory studies, ordering and review of radiographic studies, pulse oximetry, re-evaluation of patient's condition and review of old charts        EKG          EKG time: 1201  Rhythm/Rate: NSR 68 occasional PACs  P waves and MN: nml  QRS, axis: nml   ST and T waves: nml     Interpreted Contemporaneously by me, independently viewed  Unchanged compared to prior 9/17/19      MEDICATIONS GIVEN IN ER    Medications   sodium chloride 0.9 % bolus 1,000 mL (1,000 mL Intravenous New Bag 3/14/20 1145)   norepinephrine (LEVOPHED) 8 mg/250 mL (32 mcg/mL) in sodium chloride 0.9% infusion (premix) (0.1 mcg/kg/min × 91.2 kg Intravenous Rate/Dose Change 3/14/20 1217)   sodium chloride 0.9 % bolus 1,000 mL (0 mL Intravenous Stopped 3/14/20 1112)   cefepime (MAXIPIME) 2 g/100 mL 0.9% NS (mbp) (0 g Intravenous Stopped 3/14/20 1216)   vancomycin 1750 mg/500 mL 0.9% NS IVPB ([facility]) (1,750 mg Intravenous New Bag 3/14/20 1216)   sodium chloride 0.9 % bolus 2,736 mL (0 mL/kg × 91.2 kg Intravenous Stopped 3/14/20 1140)         PROGRESS, DATA ANALYSIS, CONSULTS, AND MEDICAL DECISION MAKING    All labs have been independently reviewed by me.  All radiology studies have been reviewed by me and discussed with radiologist dictating the report.   EKG's independently viewed and interpreted by me.  Discussion below represents my analysis of pertinent findings related to patient's condition, differential diagnosis, treatment plan and final disposition.    ED Course as of Mar 14 1232   Sat Mar 14, 2020   1224 12:24  Patient here for sepsis.  Had fever prior to arrival as well as hypotension.  Given 30 cc/kg bolus.  Now on levophed.  Blood pressure improved.  Has bilateral ankle wounds that are extremely tender and left wound with copious drainage.   Given cefepime and vancomycin.  Doubt COVID as he has had no respiratory symptoms, elevated PCT.  Elevated WBC all different than reported COVID.  Discussed with Dr. Silver who will.  Cath had 300cc so not in urinary retention.    [SL]      ED Course User Index  [SL] Sherman Madrigal MD     1053- Vancomycin and Cefepime ordered for pt infection.     1102- Rechecked pt. Remains hypotensive. Another liter IVF ordered.     1110- Rechecked pt. Remains hypotensive at 62/40. Second liter being administered.    1115- Attempted to contact the pt's son (who is POA). No answer.     1118- HGB 7.8. Due to this, I performed a rectal exam to assess for possible rectal bleed. His stool is heme negative. Pt remains hypotensive.     1130- Pt remains hypotensive despite 2L IVF. Levophed ordered.     1207- Placed call to Pulmonology. BP beginning to improve. Systolic now in the 80s.    1221- Discussed pt with Dr. Silver (Pulmonology). He agrees to admit the pt to the ICU.    SEPTIC SHOCK FOCUSED EXAM ATTESTATION    I attest that I have reassessed tissue perfusion after the fluid bolus given.    Sherman Madrigal MD  03/14/20  12:30  --  AS OF 12:32 VITALS:    BP - (!) 73/46  HR - 64  TEMP - 96.6 °F (35.9 °C) (Tympanic)  O2 SATS - 92%  --    DIAGNOSIS  Final diagnoses:   Septic shock (CMS/HCC)   Acute renal failure, unspecified acute renal failure type (CMS/HCC)   Ulcer of lower extremity, limited to breakdown of skin, unspecified laterality (CMS/HCC)         DISPOSITION  ADMISSION    Discussed treatment plan and reason for admission with pt/family and admitting physician.  Pt/family voiced understanding of the plan for admission for further testing/treatment as needed.     --  Documentation assistance provided by britney Woodson for Dr. Kaitlin MD.  Information recorded by the scribkortney was done at my direction and has been verified and validated by me.     Chava Woodson  03/14/20 [id number]       Sherman Madrigal MD  03/14/20  [id number]

## 2020-03-14 NOTE — H&P
Group: [place] [facility]         H/p  NOTE    Patient Identification:  Chava Stone Sr.  69 y.o.  male  1950  [id number]              CC: Altered mental status    History of Present Illness:  69-year-old gentleman from nursing home presented the emergency room with altered mental status.  Patient apparently at baseline is more awake alert and oriented.  Today he was not responding well per staff.  He did have some ulcers on his lower extremity which has been draining.  He was noted to be hypotensive in the emergency room despite fluid resuscitation.  Currently patient obtunded and unable to give me any meaningful history.  He was noted to have multiple wounds in his lower extremity that were draining.    Review of Systems  Unable to get with the patient's present condition  Past Medical History:  Past Medical History:   Diagnosis Date   • Anemia    • Arthritis    • At risk for sleep apnea     STOP BANG 5   • Chronic pain    • Diverticulosis of sigmoid colon 07/14/2011   • DVT (deep venous thrombosis) (CMS/HCC)    • Epilepsy (CMS/HCC)     Since childhood   • History of solitary pulmonary nodule    • History of vitamin D deficiency    • Hyperprolactinemia (CMS/HCC) 2011   • Hypertension    • Hypogonadism in male    • Hypothyroidism    • Injury of back    • Leg pain    • Low back pain     DDD   • Lumbar canal stenosis    • Lumbar radiculopathy    • Lytic bone lesions on xray 2018   • Peripheral neuropathy    • Pulmonary embolism (CMS/HCC)        Past Surgical History:  Past Surgical History:   Procedure Laterality Date   • COLONOSCOPY  2011    1 polyp a 18 cm proximal to anus; sigmoid diverticulosis   • COLONOSCOPY N/A 4/16/2019    Procedure: COLONOSCOPY to cecum;  Surgeon: Antonette Mar MD;  Location: [facility] ENDOSCOPY;  Service: Gastroenterology   • EPIDURAL BLOCK     • HAND SURGERY Right    • PROSTATE SURGERY      Biopsy, benign   • SPINAL CORD STIMULATOR IMPLANT     • SPINAL CORD STIMULATOR  IMPLANT N/A 2018    Procedure: [place] Sci- Spinal Cord Stimulator revision--battery ;  Surgeon: Ced Castro MD;  Location: [facility] OR;  Service: Pain Management   • THYROID BIOPSY      using Intraspinal Neurostimulator        Home Meds:  Medications Prior to Admission   Medication Sig Dispense Refill Last Dose   • acetaminophen (TYLENOL) 325 MG tablet Take 2 tablets by mouth Every 6 (Six) Hours As Needed for Mild Pain .   Unknown at Unknown time   • Ascorbic Acid 500 MG chewable tablet Chew.      • aspirin 325 MG tablet Take 650 mg by mouth Daily.   More than a month at Unknown time   • atorvastatin (LIPITOR) 40 MG tablet Take 40 mg by mouth Daily.      • bisacodyl (DULCOLAX) 10 MG suppository Insert 1 suppository into the rectum Daily As Needed for Constipation.   Unknown at Unknown time   • Cholecalciferol (VITAMIN D) 2000 UNITS capsule Take 1 capsule by mouth Daily. HOLD PRIOR TO SURG   Unknown at Unknown time   • DILANTIN 100 MG ER capsule Take 5 capsules each day at bedtime 150 capsule 6 Past Week at Unknown time   • dorzolamide-timolol (COSOPT) 22.3-6.8 MG/ML ophthalmic solution Administer 1 drop to both eyes 2 (Two) Times a Day. 10 mL 1 Unknown at Unknown time   • DULoxetine (CYMBALTA) 60 MG capsule Take 60 mg by mouth Daily.      • finasteride (PROSCAR) 5 MG tablet Take 5 mg by mouth Daily.   Past Week at Unknown time   • folic acid (FOLVITE) 1 MG tablet Take 1 tablet by mouth Daily.   Past Week at Unknown time   • gabapentin (NEURONTIN) 300 MG capsule Take 300 mg by mouth 2 (Two) Times a Day.      • levothyroxine (SYNTHROID, LEVOTHROID) 88 MCG tablet TAKE ONE TABLET BY MOUTH DAILY 90 tablet 0 Past Week at Unknown time   • magnesium hydroxide (MILK OF MAGNESIA) 400 MG/5ML suspension Take  by mouth Daily As Needed for Constipation.      • metoprolol tartrate (LOPRESSOR) 25 MG tablet Take 1 tablet by mouth Every 12 (Twelve) Hours.      • Nutritional Supplements (PROMOD PO) Take 30 mL by  mouth 2 (Two) Times a Day.      • oxyCODONE-acetaminophen (PERCOCET) 7.5-325 MG per tablet Take 1 tablet by mouth Every 4 (Four) Hours As Needed.      • polyethylene glycol (MIRALAX) pack packet Take 17 g by mouth Daily.   Unknown at Unknown time   • rivaroxaban (XARELTO) 10 MG tablet Take 10 mg by mouth Daily.      • sennosides-docusate sodium (SENOKOT-S) 8.6-50 MG tablet Take 2 tablets by mouth Every Night.      • Sodium Phosphates (FLEET ENEMA) 7-19 GM/118ML enema Insert  into the rectum 1 (One) Time.      • tamsulosin (FLOMAX) 0.4 MG capsule 24 hr capsule Take 1 capsule by mouth Daily. 30 capsule     • thiamine (VITAMIN B1) 100 MG tablet Take 1 tablet by mouth Daily.   Past Week at Unknown time   • tiZANidine (ZANAFLEX) 4 MG tablet Take 4 mg by mouth Every 8 (Eight) Hours As Needed for Muscle Spasms.      • baclofen 5 MG tablet Take 5 mg by mouth Every 12 (Twelve) Hours.      • enoxaparin (LOVENOX) 40 MG/0.4ML solution syringe Inject 0.4 mL under the skin into the appropriate area as directed Daily. 11.2 mL     • miconazole (MICOTIN) 2 % powder Apply  topically to the appropriate area as directed Every 12 (Twelve) Hours.      • OLANZapine (zyPREXA) 2.5 MG tablet Take 1 tablet by mouth 2 (Two) Times a Day.      • sulfamethoxazole-trimethoprim (BACTRIM DS,SEPTRA DS) 800-160 MG per tablet Take 1 tablet by mouth 2 (Two) Times a Day. 14 tablet 0        Allergies:  Allergies   Allergen Reactions   • Phenytoin Mental Status Change     Only reaction to generic form.       Social History:   Social History     Socioeconomic History   • Marital status:      Spouse name: Not on file   • Number of children: Not on file   • Years of education: 3 years College   • Highest education level: Not on file   Occupational History   • Occupation: Manager     Employer: [employer]   Tobacco Use   • Smoking status: Never Smoker   • Smokeless tobacco: Never Used   Substance and Sexual Activity   • Alcohol use: No      "Frequency: Never   • Drug use: No   • Sexual activity: Defer       Family History:  Family History   Problem Relation Age of Onset   • Heart disease Mother    • Depression Father    • Heart disease Father    • Lung cancer Brother    • Malig Hyperthermia Neg Hx        Physical Exam:  BP 94/51   Pulse 84   Temp 97.4 °F (36.3 °C) (Oral)   Resp 18   Ht 172.7 cm (68\")   Wt 85.7 kg (188 lb 15 oz)   SpO2 95%   BMI 28.73 kg/m²  Body mass index is 28.73 kg/m². 95% 85.7 kg (188 lb 15 oz)  Physical Exam  Well-developed normal body habitus  Eyes normal conjunctive pupils reactive to light  ENT dry oral mucous membrane Mallampati between 2 and 3 normal nasal exam  Neck midline trachea no thyromegaly  Chest diminished breath sound no wheezing or crackles  CVS regular rate and rhythm 1+ lower extremity edema  Abdomen soft nontender no hepatosplenomegaly  CNS Sleepy lethargic but intact unable to assess sensory exam  Psych unable to assess  Musculoskeletal lower extremity multiple ulcers with drainage and erythema noted no joint deformity or cyanosis or clubbing noted      LABS:  Lab Results   Component Value Date    CALCIUM 8.0 (L) 03/14/2020    PHOS 3.6 10/22/2018     Results from last 7 days   Lab Units 03/14/20  1056   SODIUM mmol/L 142   POTASSIUM mmol/L 4.3   CHLORIDE mmol/L 102   CO2 mmol/L 19.8*   BUN mg/dL 65*   CREATININE mg/dL 3.86*   GLUCOSE mg/dL 119*   CALCIUM mg/dL 8.0*   WBC 10*3/mm3 15.04*   HEMOGLOBIN g/dL 7.8*   PLATELETS 10*3/mm3 208   ALT (SGPT) U/L 86*   AST (SGOT) U/L 126*   PROCALCITONIN ng/mL 14.97*     Lab Results   Component Value Date    CKTOTAL 84 09/07/2019    TROPONINI 0.040 (H) 05/16/2019    TROPONINT 0.128 (C) 03/14/2020     Results from last 7 days   Lab Units 03/14/20  1056   TROPONIN T ng/mL 0.128*         Results from last 7 days   Lab Units 03/14/20  1056   PROCALCITONIN ng/mL 14.97*   LACTATE mmol/L 1.0         Results from last 7 days   Lab Units 03/14/20  1112   ADENOVIRUS DETECTION " BY PCR  Not Detected   CORONAVIRUS 229E  Not Detected   CORONAVIRUS HKU1  Not Detected   CORONAVIRUS NL63  Not Detected   CORONAVIRUS OC43  Not Detected   HUMAN METAPNEUMOVIRUS  Not Detected   HUMAN RHINOVIRUS/ENTEROVIRUS  Not Detected   INFLUENZA B PCR  Not Detected   PARAINFLUENZA 1  Not Detected   PARAINFLUENZA VIRUS 2  Not Detected   PARAINFLUENZA VIRUS 3  Not Detected   PARAINFLUENZA VIRUS 4  Not Detected   BORDETELLA PERTUSSIS PCR  Not Detected   BORDETELLA PARAPERTUSSIS PCR  Not Detected   [id number]  Not Detected   CHLAMYDOPHILA PNEUMONIAE PCR  Not Detected   MYCOPLAMA PNEUMO PCR  Not Detected   INFLUENZA A H3  Not Detected   INFLUENZA A H1  Not Detected   RSV, PCR  Not Detected             Lab Results   Component Value Date    TSH 5.370 (H) 09/09/2019     Estimated Creatinine Clearance: 19 mL/min (A) (by C-G formula based on SCr of 3.86 mg/dL (H)).  Results from last 7 days   Lab Units 03/14/20  1112   NITRITE UA  Negative   WBC UA /HPF 21-30*   BACTERIA UA /HPF 2+*   SQUAM EPITHEL UA /HPF 0-2        Imaging: I personally visualized the images of scans/x-rays performed within last 3 days.      Assessment:  Severe sepsis with septic shock  Lower extremity ulcer with cellulitis  Altered mental status/metabolic encephalopathy  Acute renal failure  UTI  Anemia  Elevated troponin  Transaminitis  Infected pressure ulcer  Seizure disorder  Hypothyroidism      Recommendations:  Point of gentleman with severe sepsis likely source lower extremity with cellulitis and infected ulcers.  I would initiate broad-spectrum antibiotic with Zosyn and vancomycin for now.  There appears to be some oozing from the wound and I will go ahead and consult general surgery to evaluate further  Fluid resuscitation with pressors if needed  Monitor creatinine closely.  Elevated troponin likely due to sepsis  Transaminitis we will trend LFTs likely due to sepsis  Multiple medical problems and issues noted  Discussed with family wants full  DNR      Critical care time 40 minutes        Kendrick Silver MD  3/14/2020  15:07      Much of this encounter note is an electronic transcription/translation of spoken language to printed text using Dragon Software.

## 2020-03-14 NOTE — CONSULTS
SURGERY  Chava Stone Sr.   1950    Chief Complaint:  sepsis  Date of admission: 3/14/2020   Today's date:  03/14/20    HPI    Mr. Stone is a  69 y.o. male nursing home patient, Oren Lindsey, who presented to the ER with mental status changes, diagnosed with sepsis.  I was asked to see the patient by Dr Silver for draining infected wounds.  When seen in the ER he complained of right lower extremity pain, and has a history of DVT right lower extremity and PE.  Studies 9/2019 were normal on the leg.  His procalcitonin was 14.97, with WBC 15.  Urine was collected in the ER and had 2+ bacteria, 21-30 WBC.  Last September he had proteus UTI.      He has chronically contracted legs with the left laying over the right, my being unable to separate them.  He has chronic heel ulcers per the family and they have made him DNR and wish no interventions of significance from a surgical standpoint.  He has been started on cefepime, vanc and zosyn.    Past Medical History:   Diagnosis Date   • Anemia    • Arthritis    • At risk for sleep apnea     STOP BANG 5   • Chronic pain    • Diverticulosis of sigmoid colon 07/14/2011   • DVT (deep venous thrombosis) (CMS/HCC)    • Epilepsy (CMS/HCC)     Since childhood   • History of solitary pulmonary nodule    • History of vitamin D deficiency    • Hyperprolactinemia (CMS/HCC) 2011   • Hypertension    • Hypogonadism in male    • Hypothyroidism    • Injury of back    • Leg pain    • Low back pain     DDD   • Lumbar canal stenosis    • Lumbar radiculopathy    • Lytic bone lesions on xray 2018   • Peripheral neuropathy    • Pulmonary embolism (CMS/HCC)      Past Surgical History:   Procedure Laterality Date   • COLONOSCOPY  2011    1 polyp a 18 cm proximal to anus; sigmoid diverticulosis   • COLONOSCOPY N/A 4/16/2019    Procedure: COLONOSCOPY to cecum;  Surgeon: Antonette Mar MD;  Location: [facility] ENDOSCOPY;  Service: Gastroenterology   • EPIDURAL BLOCK     • HAND SURGERY Right     • PROSTATE SURGERY      Biopsy, benign   • SPINAL CORD STIMULATOR IMPLANT     • SPINAL CORD STIMULATOR IMPLANT N/A 2018    Procedure: [place] Sci- Spinal Cord Stimulator revision--battery ;  Surgeon: Ced Castro MD;  Location: [facility];  Service: Pain Management   • THYROID BIOPSY      using Intraspinal Neurostimulator     Family History   Problem Relation Age of Onset   • Heart disease Mother    • Depression Father    • Heart disease Father    • Lung cancer Brother    • Malig Hyperthermia Neg Hx      Social History     Socioeconomic History   • Marital status:      Spouse name: Not on file   • Number of children: Not on file   • Years of education: 3 years College   • Highest education level: Not on file   Occupational History   • Occupation: Manager     Employer: [employer]   Tobacco Use   • Smoking status: Never Smoker   • Smokeless tobacco: Never Used   Substance and Sexual Activity   • Alcohol use: No     Frequency: Never   • Drug use: No   • Sexual activity: Defer         Current Facility-Administered Medications:   •  levETIRAcetam in NaCl 0.82% (KEPPRA) IVPB 500 mg, 500 mg, Intravenous, Q12H, Kendrick Silver MD  •  levothyroxine (SYNTHROID, LEVOTHROID) tablet 88 mcg, 88 mcg, Oral, Daily, Kendrick Silver MD  •  norepinephrine (LEVOPHED) 8 mg/250 mL (32 mcg/mL) in sodium chloride 0.9% infusion (premix), 0.02-0.3 mcg/kg/min, Intravenous, Titrated, Kendrick Silver MD, Last Rate: 17.1 mL/hr at 20 1511, 0.1 mcg/kg/min at 20 1511  •  Pharmacy to dose vancomycin, , Does not apply, Continuous PRN, Kendrick Silver MD  •  piperacillin-tazobactam (ZOSYN) 3.375 g in iso-osmotic dextrose 50 ml (premix), 3.375 g, Intravenous, Once, Kendrick Silver MD  •  [START ON 3/15/2020] piperacillin-tazobactam (ZOSYN) 3.375 g in iso-osmotic dextrose 50 ml (premix), 3.375 g, Intravenous, Q12H, Kendrick Silver MD  •  sodium chloride 0.9 % infusion, 150 mL/hr, Intravenous,  "Continuous, Kendrick Silver MD  •  Vancomycin Pharmacy Intermittent Dosing, , Does not apply, Daily, Kendrick Silver MD    Allergies   Allergen Reactions   • Phenytoin Mental Status Change     Only reaction to generic form.     Review of Systems  Unable to obtain  All other systems reviewed and negative.    Vitals:    03/14/20 1430 03/14/20 1500 03/14/20 1515 03/14/20 1530   BP: 94/51 (!) 68/40 (!) 87/53 93/53   BP Location:       Patient Position:       Pulse: 84 72 70 72   Resp:       Temp:       TempSrc:       SpO2: 95% 96% 99% 98%   Weight:       Height:           PHYSICAL EXAM:    BP 93/53   Pulse 72   Temp 97.4 °F (36.3 °C) (Oral)   Resp 18   Ht 172.7 cm (68\")   Wt 85.7 kg (188 lb 15 oz)   SpO2 98%   BMI 28.73 kg/m²   Body mass index is 28.73 kg/m².    Constitutional: well developed upper body, seems well nourished, but lower body with doughy appearance with crossed legs that can't be moved.  In ICU with IVF at 999/hr.  Eyes: closed  ENMT: trachea midline  CVS: tachy, with hypotension, peripheral edema present 2-3+  Respiratory: CTA, normal respiratory effort   Gastrointestinal: no hepatosplenomegaly, abdomen soft  Musculoskeletal: gait not witnessed, muscle mass diminished in lower legs, right and left heel with pressure wounds, left open with serous drainage  Skin: cool, pretibial wound with subQ erythema, thickening of the pretibial area.  Neurological: eyes closed, mumbling  Psychiatric: unable to asses  Lymphatics: no cervical adenopathy    Radiographic/Lab Findings: CXR today possilbe infiltrate left base.    IMPRESSION:  · Chronic wounds lower leg, without overt evidence of cellulitis.  Could be infected ulcers tho they are not impressive and especially considering the familie's wishes would not be appropriate to debride.  Agree with abx.    PLAN:  · Will check a thyroid function panel.  · Check in AM but almost certainly, no surgical intervention    Mayelin Mendez MD  4:16 PM  Saturday  "

## 2020-03-14 NOTE — ED NOTES
Pt to this ER via EMS, per EMS,pt was to known to be A&Ox4, upon arrival pt was very vocal, crying out in pain.        Zac Da Silva, RN  03/14/20 9994

## 2020-03-15 NOTE — PROGRESS NOTES
"[facility] PULMONARY CARE         Dr Marks Sayied   LOS: 1 day   Patient Care Team:  Gabbie Padgett MD as PCP - General (Geriatric Medicine)  Amy Guerra APRN as Nurse Practitioner (Pain Medicine)  Harpreet Shay MD as Surgeon (Neurosurgery)  Catracho Yo MD as Consulting Physician (Neurology)  Torrey Santana MD as Consulting Physician (Urology)  Ced Castro MD as Consulting Physician (Pain Medicine)  Willy Garner MD as Referring Physician (Internal Medicine)    Chief Complaint: Sepsis with lower extremity ulcers and cellulitis altered mental status    Interval History: Obtunded and irregular agonal respirations.  EXwife at bedside    REVIEW OF SYSTEMS:   Patient obtunded    Ventilator/Non-Invasive Ventilation Settings (From admission, onward)    None            Vital Signs  Temp:  [97.4 °F (36.3 °C)-98.9 °F (37.2 °C)] 98.9 °F (37.2 °C)  Heart Rate:  [61-89] 75  Resp:  [12-16] 14  BP: ()/(40-64) 75/49    Intake/Output Summary (Last 24 hours) at 3/15/2020 1359  Last data filed at 3/15/2020 1300  Gross per 24 hour   Intake 4000 ml   Output 608 ml   Net 3392 ml     Flowsheet Rows      First Filed Value   Admission Height  172.7 cm (68\") Documented at 03/14/2020 1144   Admission Weight  91.2 kg (201 lb 1 oz) Documented at 03/14/2020 1051          Physical Exam:   General Appearance:   Shallow agonal respirations patient is obtunded   Lungs:    Diminished breath sounds bilateral    Heart:    Regular rhythm and normal rate, normal S1 and S2, no            murmur, no gallop, no rub, no click                 Results Review:        Results from last 7 days   Lab Units 03/14/20  1056   SODIUM mmol/L 142   POTASSIUM mmol/L 4.3   CHLORIDE mmol/L 102   CO2 mmol/L 19.8*   BUN mg/dL 65*   CREATININE mg/dL 3.86*   GLUCOSE mg/dL 119*   CALCIUM mg/dL 8.0*     Results from last 7 days   Lab Units 03/14/20  1056   TROPONIN T ng/mL 0.128*     Results from last 7 days   Lab Units " 03/14/20  1056   WBC 10*3/mm3 15.04*   HEMOGLOBIN g/dL 7.8*   HEMATOCRIT % 24.0*   PLATELETS 10*3/mm3 208                           I reviewed the patient's new clinical results.  I personally viewed and interpreted the patient's CXR        Medication Review:             ASSESSMENT:   Severe sepsis with septic shock  Lower extremity ulcer with cellulitis  Altered mental status/metabolic encephalopathy  Acute renal failure  UTI  Anemia  Elevated troponin  Transaminitis  Infected pressure ulcer  Seizure disorder  Hypothyroidism  Full palliative care    PLAN:  Continue comfort measures  Patient appears to be comfortable  Discussed with his ex-wife at bedside    Kendrick Silver MD  03/15/20  13:59

## 2020-03-15 NOTE — PLAN OF CARE
Problem: Patient Care Overview  Goal: Plan of Care Review  Outcome: Ongoing (interventions implemented as appropriate)  Flowsheets  Taken 3/14/2020 2209  Progress: declining  Outcome Summary: Pt in ICU until transfer to palliative floor. Pt responsive to pain, does not follow commands. On 2L nasal cannula, O2 sats >95%. Blood pressures in the 70-80s/40-50s. UOP cloudy/minimal. Pt's son updated [place], room number provided. Report called, waiting on transport.

## 2020-03-15 NOTE — PLAN OF CARE
Problem: Patient Care Overview  Goal: Plan of Care Review  Outcome: Ongoing (interventions implemented as appropriate)  Flowsheets (Taken 3/15/2020 1605)  Progress: declining  Plan of Care Reviewed With: family; patient  Outcome Summary: Pt medicated about every 2-3 hours to maintain comfort. Ativan, Dilaudid, Robinul given with relief of symptoms. Family at bsd. Will continue comfort care.

## 2020-03-16 NOTE — PROGRESS NOTES
Case Management Discharge Note      Final Note: Patient  3/16/2020 @ 0023         Destination      No service has been selected for the patient.      Durable Medical Equipment      No service has been selected for the patient.      Dialysis/Infusion      No service has been selected for the patient.      Home Medical Care      No service has been selected for the patient.      Therapy      No service has been selected for the patient.      Community Resources      No service has been selected for the patient.             Final Discharge Disposition Code: 20 -

## 2020-03-18 NOTE — DISCHARGE SUMMARY
PHYSICIAN DISCHARGE SUMMARY                                                                        [facility]    Patient Identification:  Name: Chava Stone Sr.  Age: 69 y.o.  Sex: male  :  1950  MRN: [id number]  Primary Care Physician: Gabbie Padgett MD    Admit date: 3/14/2020  Discharge date and time: 3/16/2020  4:07 AM   Discharged Condition:     Discharge Diagnoses:  Septic shock (CMS/HCC)   Severe sepsis with septic shock  Lower extremity ulcer with cellulitis  Altered mental status/metabolic encephalopathy  Acute renal failure  UTI  Anemia  Elevated troponin  Transaminitis  Infected pressure ulcer  Seizure disorder  Hypothyroidism  Full palliative care    Hospital Course: Chava Stone Sr. presented to [facility]    Patient with  Severe sepsis shock admitted to the ICU.  Despite aggressive measures patient did not improve.  Family decided to proceed with palliative care.  Patient subsequently passed away with family at bedside  consults:   IP CONSULT TO PULMONOLOGY  IP CONSULT TO GENERAL SURGERY    Significant Diagnostic Studies:   [unfilled]        Signed:  Kendrick Silver MD  3/18/2020  16:10

## 2020-03-19 LAB
BACTERIA SPEC AEROBE CULT: NORMAL
BACTERIA SPEC AEROBE CULT: NORMAL

## 2020-03-22 LAB
BACTERIA SPEC AEROBE CULT: ABNORMAL
BACTERIA SPEC AEROBE CULT: ABNORMAL

## 2021-02-04 NOTE — PROGRESS NOTES
Physical Therapy Daily Progress Note        Subjective     Hcava Bertha reports: I was sick one day lat weeks and had a  the other day.     Objective   See Exercise, Manual, and Modality Logs for complete treatment.       Assessment/Plan  Declined ability to perform exercise on mat table as well as significantly more difficulty with sit to stand    Progress per Plan of Care           Manual Therapy:         mins  46604;  Therapeutic Exercise: 15      mins  85673;     Neuromuscular [place]:       mins  36396;    Therapeutic Activity:    5     mins  60617;     Gait Trainin     mins  63580;     Ultrasound:         mins  34417;    Electrical Stimulation:        mins  59146 ( );  Dry Needling         mins self-pay    Timed Treatment:   28   mins   Total Treatment:     40   mins    Alaina Bang PT  Physical Therapist  [place] License # [id number]   Post-Care Instructions: I reviewed with the patient in detail post-care instructions. Patient is to wear sunprotection, and avoid picking at any of the treated lesions. Pt may apply Vaseline to crusted or scabbing areas. Consent: The patient's consent was obtained including but not limited to risks of crusting, scabbing, blistering, scarring, darker or lighter pigmentary change, recurrence, incomplete removal and infection. Render Post-Care Instructions In Note?: no Detail Level: Detailed Duration Of Freeze Thaw-Cycle (Seconds): 5 Number Of Freeze-Thaw Cycles: 1 freeze-thaw cycle

## 2025-06-01 NOTE — PLAN OF CARE
Problem: Patient Care Overview  Goal: Plan of Care Review  Outcome: Ongoing (interventions implemented as appropriate)   04/14/19 1539   Coping/Psychosocial   Plan of Care Reviewed With patient   Plan of Care Review   Progress no change   OTHER   Outcome Summary Patient is very much alert today, still confused to total situation, but he is at his best today compared to Thurs or Fri. GI seen today and planning on colonoscopy on Tuesday of next week, so clears and prep Monday.        Problem: Fall Risk (Adult)  Goal: Absence of Fall  Outcome: Ongoing (interventions implemented as appropriate)      Problem: Skin Injury Risk (Adult)  Goal: Skin Health and Integrity  Outcome: Ongoing (interventions implemented as appropriate)      Problem: Mobility, Physical Impaired (Adult)  Goal: Enhanced Mobility Skills  Outcome: Ongoing (interventions implemented as appropriate)    Goal: Enhanced Functional Ability  Outcome: Ongoing (interventions implemented as appropriate)         Initial (On Arrival)

## (undated) DEVICE — REMOTE CONTROL KIT: Brand: FREELINK™

## (undated) DEVICE — HEX WRENCH, 7.6CM: Brand: CLIK™ ANCHOR

## (undated) DEVICE — DRSNG SURESITE123 6X8IN

## (undated) DEVICE — Device: Brand: PORTEX

## (undated) DEVICE — ADHS SKIN DERMABOND TOP ADVANCED

## (undated) DEVICE — Device: Brand: DEFENDO AIR/WATER/SUCTION AND BIOPSY VALVE

## (undated) DEVICE — GLV SURG BIOGEL LTX PF 7 1/2

## (undated) DEVICE — SHLD ANGIO 2LAYR CIR FEN

## (undated) DEVICE — FASTNR CATH PERC 5 TO12FR

## (undated) DEVICE — GLV XRAY PROTECT RADIONX LTX SZ8 KHAKI

## (undated) DEVICE — LOU MINOR PROCEDURE: Brand: MEDLINE INDUSTRIES, INC.

## (undated) DEVICE — THE TORRENT IRRIGATION SCOPE CONNECTOR IS USED WITH THE TORRENT IRRIGATION TUBING TO PROVIDE IRRIGATION FLUIDS SUCH AS STERILE WATER DURING GASTROINTESTINAL ENDOSCOPIC PROCEDURES WHEN USED IN CONJUNCTION WITH AN IRRIGATION PUMP (OR ELECTROSURGICAL UNIT).: Brand: TORRENT

## (undated) DEVICE — IRRIGATOR BULB ASEPTO 60CC STRL

## (undated) DEVICE — NDL HYPO PRECISIONGLIDE REG 25G 1 1/2

## (undated) DEVICE — DRP C/ARM 41X74IN

## (undated) DEVICE — TUBING, SUCTION, 1/4" X 10', STRAIGHT: Brand: MEDLINE

## (undated) DEVICE — DRAPE,REIN 53X77,STERILE: Brand: MEDLINE

## (undated) DEVICE — APPL CHLORAPREP W/TINT 26ML ORNG

## (undated) DEVICE — CANNULA,ADULT,SOFT-TOUCH,7'TUBE,UC: Brand: PENDING

## (undated) DEVICE — CHARGING SYSTEM KIT: Brand: PRECISION™